# Patient Record
Sex: FEMALE | Race: WHITE | Employment: OTHER | URBAN - NONMETROPOLITAN AREA
[De-identification: names, ages, dates, MRNs, and addresses within clinical notes are randomized per-mention and may not be internally consistent; named-entity substitution may affect disease eponyms.]

---

## 2017-01-10 ENCOUNTER — TELEPHONE (OUTPATIENT)
Dept: FAMILY MEDICINE CLINIC | Age: 57
End: 2017-01-10

## 2017-02-20 ENCOUNTER — OFFICE VISIT (OUTPATIENT)
Dept: FAMILY MEDICINE CLINIC | Age: 57
End: 2017-02-20

## 2017-02-20 VITALS — HEART RATE: 80 BPM | DIASTOLIC BLOOD PRESSURE: 76 MMHG | SYSTOLIC BLOOD PRESSURE: 134 MMHG

## 2017-02-20 DIAGNOSIS — J01.00 SUBACUTE MAXILLARY SINUSITIS: Primary | ICD-10-CM

## 2017-02-20 PROCEDURE — 99212 OFFICE O/P EST SF 10 MIN: CPT | Performed by: FAMILY MEDICINE

## 2017-02-20 RX ORDER — HYDROCODONE BITARTRATE AND ACETAMINOPHEN 10; 325 MG/1; MG/1
1 TABLET ORAL 3 TIMES DAILY PRN
Qty: 90 TABLET | Refills: 0 | Status: SHIPPED | OUTPATIENT
Start: 2017-02-20 | End: 2017-08-01 | Stop reason: SDUPTHER

## 2017-02-20 RX ORDER — CEFACLOR 500 MG
500 CAPSULE ORAL 2 TIMES DAILY
Qty: 20 CAPSULE | Refills: 0 | Status: SHIPPED | OUTPATIENT
Start: 2017-02-20 | End: 2017-03-02

## 2017-02-20 RX ORDER — FLUTICASONE PROPIONATE 50 MCG
SPRAY, SUSPENSION (ML) NASAL
Qty: 1 BOTTLE | Refills: 0 | Status: SHIPPED | OUTPATIENT
Start: 2017-02-20 | End: 2017-08-01 | Stop reason: SDUPTHER

## 2017-02-24 ENCOUNTER — TELEPHONE (OUTPATIENT)
Dept: FAMILY MEDICINE CLINIC | Age: 57
End: 2017-02-24

## 2017-02-24 RX ORDER — PROMETHAZINE HYDROCHLORIDE AND CODEINE PHOSPHATE 6.25; 1 MG/5ML; MG/5ML
5 SYRUP ORAL 4 TIMES DAILY PRN
Qty: 118 ML | Refills: 0 | Status: SHIPPED | OUTPATIENT
Start: 2017-02-24 | End: 2017-03-08 | Stop reason: ALTCHOICE

## 2017-02-24 RX ORDER — PREDNISONE 20 MG/1
20 TABLET ORAL 2 TIMES DAILY
Qty: 10 TABLET | Refills: 0 | Status: SHIPPED | OUTPATIENT
Start: 2017-02-24 | End: 2017-03-01

## 2017-03-03 ENCOUNTER — TELEPHONE (OUTPATIENT)
Dept: FAMILY MEDICINE CLINIC | Age: 57
End: 2017-03-03

## 2017-03-03 RX ORDER — CLOTRIMAZOLE 10 MG/1
10 LOZENGE ORAL; TOPICAL
Qty: 25 TABLET | Refills: 1 | Status: SHIPPED | OUTPATIENT
Start: 2017-03-03 | End: 2017-03-08 | Stop reason: ALTCHOICE

## 2017-03-08 ENCOUNTER — OFFICE VISIT (OUTPATIENT)
Dept: FAMILY MEDICINE CLINIC | Age: 57
End: 2017-03-08

## 2017-03-08 VITALS
HEART RATE: 96 BPM | WEIGHT: 189.6 LBS | BODY MASS INDEX: 30.47 KG/M2 | SYSTOLIC BLOOD PRESSURE: 132 MMHG | HEIGHT: 66 IN | TEMPERATURE: 98.4 F | DIASTOLIC BLOOD PRESSURE: 60 MMHG

## 2017-03-08 DIAGNOSIS — H10.9 BACTERIAL CONJUNCTIVITIS OF RIGHT EYE: ICD-10-CM

## 2017-03-08 DIAGNOSIS — J01.00 SUBACUTE MAXILLARY SINUSITIS: Primary | ICD-10-CM

## 2017-03-08 PROCEDURE — 99212 OFFICE O/P EST SF 10 MIN: CPT | Performed by: FAMILY MEDICINE

## 2017-03-08 RX ORDER — FLUTICASONE PROPIONATE 50 MCG
1 SPRAY, SUSPENSION (ML) NASAL DAILY
Qty: 1 BOTTLE | Refills: 0 | Status: SHIPPED | OUTPATIENT
Start: 2017-03-08 | End: 2017-08-01 | Stop reason: ALTCHOICE

## 2017-03-08 RX ORDER — AZITHROMYCIN 250 MG/1
TABLET, FILM COATED ORAL
Qty: 1 PACKET | Refills: 0 | Status: SHIPPED | OUTPATIENT
Start: 2017-03-08 | End: 2017-03-18

## 2017-03-08 RX ORDER — GENTAMICIN SULFATE 3 MG/ML
1 SOLUTION/ DROPS OPHTHALMIC 3 TIMES DAILY
Qty: 1 BOTTLE | Refills: 0 | Status: SHIPPED | OUTPATIENT
Start: 2017-03-08 | End: 2017-03-15

## 2017-03-08 RX ORDER — PREDNISONE 20 MG/1
20 TABLET ORAL 2 TIMES DAILY
Qty: 10 TABLET | Refills: 0 | Status: SHIPPED | OUTPATIENT
Start: 2017-03-08 | End: 2017-03-13

## 2017-05-05 RX ORDER — BUPROPION HYDROCHLORIDE 300 MG/1
300 TABLET ORAL EVERY MORNING
Qty: 30 TABLET | Refills: 0 | Status: SHIPPED | OUTPATIENT
Start: 2017-05-05 | End: 2017-08-01 | Stop reason: SDUPTHER

## 2017-08-01 ENCOUNTER — OFFICE VISIT (OUTPATIENT)
Dept: FAMILY MEDICINE CLINIC | Age: 57
End: 2017-08-01
Payer: COMMERCIAL

## 2017-08-01 VITALS
DIASTOLIC BLOOD PRESSURE: 70 MMHG | WEIGHT: 182 LBS | BODY MASS INDEX: 30.32 KG/M2 | HEART RATE: 64 BPM | SYSTOLIC BLOOD PRESSURE: 110 MMHG | HEIGHT: 65 IN

## 2017-08-01 DIAGNOSIS — M51.36 DEGENERATIVE DISC DISEASE, LUMBAR: Chronic | ICD-10-CM

## 2017-08-01 DIAGNOSIS — M54.41 ACUTE BACK PAIN WITH SCIATICA, RIGHT: ICD-10-CM

## 2017-08-01 DIAGNOSIS — M48.061 LUMBAR SPINAL STENOSIS: Chronic | ICD-10-CM

## 2017-08-01 DIAGNOSIS — Z12.31 ENCOUNTER FOR SCREENING MAMMOGRAM FOR BREAST CANCER: ICD-10-CM

## 2017-08-01 DIAGNOSIS — J30.2 SEASONAL ALLERGIC RHINITIS, UNSPECIFIED ALLERGIC RHINITIS TRIGGER: ICD-10-CM

## 2017-08-01 DIAGNOSIS — K21.9 GASTROESOPHAGEAL REFLUX DISEASE, ESOPHAGITIS PRESENCE NOT SPECIFIED: Chronic | ICD-10-CM

## 2017-08-01 DIAGNOSIS — G89.4 CHRONIC PAIN SYNDROME: Primary | Chronic | ICD-10-CM

## 2017-08-01 DIAGNOSIS — F41.9 ANXIETY: ICD-10-CM

## 2017-08-01 DIAGNOSIS — Z00.00 HEALTH CARE MAINTENANCE: ICD-10-CM

## 2017-08-01 DIAGNOSIS — G89.4 CHRONIC PAIN SYNDROME: Chronic | ICD-10-CM

## 2017-08-01 PROCEDURE — 99214 OFFICE O/P EST MOD 30 MIN: CPT | Performed by: FAMILY MEDICINE

## 2017-08-01 RX ORDER — HYDROCODONE BITARTRATE AND ACETAMINOPHEN 10; 325 MG/1; MG/1
1 TABLET ORAL 3 TIMES DAILY PRN
Qty: 90 TABLET | Refills: 0 | Status: SHIPPED | OUTPATIENT
Start: 2017-08-01 | End: 2018-02-01 | Stop reason: SDUPTHER

## 2017-08-01 RX ORDER — BUPROPION HYDROCHLORIDE 300 MG/1
300 TABLET ORAL EVERY MORNING
Qty: 90 TABLET | Refills: 1 | Status: SHIPPED | OUTPATIENT
Start: 2017-08-01 | End: 2018-01-30 | Stop reason: SDUPTHER

## 2017-08-01 RX ORDER — PREDNISONE 20 MG/1
40 TABLET ORAL DAILY
Qty: 10 TABLET | Refills: 0 | Status: SHIPPED | OUTPATIENT
Start: 2017-08-01 | End: 2018-02-01

## 2017-08-01 RX ORDER — HYDROCODONE BITARTRATE AND ACETAMINOPHEN 10; 325 MG/1; MG/1
1 TABLET ORAL 3 TIMES DAILY PRN
Qty: 90 TABLET | Refills: 0 | Status: SHIPPED | OUTPATIENT
Start: 2017-08-01 | End: 2017-08-01 | Stop reason: SDUPTHER

## 2017-08-01 RX ORDER — PREDNISONE 20 MG/1
40 TABLET ORAL DAILY
Qty: 10 TABLET | Refills: 0 | Status: SHIPPED | OUTPATIENT
Start: 2017-08-01 | End: 2017-08-01 | Stop reason: SDUPTHER

## 2017-08-01 RX ORDER — CYCLOBENZAPRINE HCL 10 MG
10 TABLET ORAL 2 TIMES DAILY PRN
Qty: 180 TABLET | Refills: 1 | Status: SHIPPED | OUTPATIENT
Start: 2017-08-01 | End: 2018-02-01 | Stop reason: SDUPTHER

## 2017-08-01 RX ORDER — FLUTICASONE PROPIONATE 50 MCG
SPRAY, SUSPENSION (ML) NASAL
Qty: 1 BOTTLE | Refills: 2 | Status: SHIPPED | OUTPATIENT
Start: 2017-08-01 | End: 2019-10-17

## 2017-08-01 RX ORDER — OMEPRAZOLE 20 MG/1
20 CAPSULE, DELAYED RELEASE ORAL 2 TIMES DAILY PRN
Qty: 180 CAPSULE | Refills: 1 | Status: SHIPPED | OUTPATIENT
Start: 2017-08-01 | End: 2018-02-01 | Stop reason: SDUPTHER

## 2017-08-01 ASSESSMENT — ENCOUNTER SYMPTOMS
DIARRHEA: 0
BACK PAIN: 1
CONSTIPATION: 1
COLOR CHANGE: 0
BOWEL INCONTINENCE: 0
SORE THROAT: 0
ABDOMINAL PAIN: 0
SHORTNESS OF BREATH: 0
RHINORRHEA: 1
CHEST TIGHTNESS: 0

## 2017-08-01 ASSESSMENT — PATIENT HEALTH QUESTIONNAIRE - PHQ9
1. LITTLE INTEREST OR PLEASURE IN DOING THINGS: 0
SUM OF ALL RESPONSES TO PHQ9 QUESTIONS 1 & 2: 0
SUM OF ALL RESPONSES TO PHQ QUESTIONS 1-9: 0
2. FEELING DOWN, DEPRESSED OR HOPELESS: 0

## 2017-08-02 ENCOUNTER — TELEPHONE (OUTPATIENT)
Dept: FAMILY MEDICINE CLINIC | Age: 57
End: 2017-08-02

## 2017-08-15 ENCOUNTER — TELEPHONE (OUTPATIENT)
Dept: FAMILY MEDICINE CLINIC | Age: 57
End: 2017-08-15

## 2018-01-29 ENCOUNTER — HOSPITAL ENCOUNTER (OUTPATIENT)
Age: 58
Discharge: HOME | End: 2018-01-29
Payer: MEDICARE

## 2018-01-29 DIAGNOSIS — Z00.00 HEALTH CARE MAINTENANCE: ICD-10-CM

## 2018-01-29 LAB
ALBUMIN SERPL-MCNC: 4.6 G/DL (ref 3.5–5.1)
ALP BLD-CCNC: 88 U/L (ref 38–126)
ALT SERPL-CCNC: 19 U/L (ref 11–66)
ANION GAP SERPL CALCULATED.3IONS-SCNC: 15 MEQ/L (ref 8–16)
AST SERPL-CCNC: 24 U/L (ref 5–40)
BILIRUB SERPL-MCNC: 0.3 MG/DL (ref 0.3–1.2)
BUN BLDV-MCNC: 10 MG/DL (ref 7–22)
CALCIUM SERPL-MCNC: 9.4 MG/DL (ref 8.5–10.5)
CHLORIDE BLD-SCNC: 102 MEQ/L (ref 98–111)
CHOLESTEROL, TOTAL: 192 MG/DL (ref 100–199)
CO2: 28 MEQ/L (ref 23–33)
CREAT SERPL-MCNC: 0.7 MG/DL (ref 0.4–1.2)
GFR SERPL CREATININE-BSD FRML MDRD: 86 ML/MIN/1.73M2
GLUCOSE BLD-MCNC: 113 MG/DL (ref 70–108)
HDLC SERPL-MCNC: 131 MG/DL
LDL CHOLESTEROL CALCULATED: 36 MG/DL
POTASSIUM SERPL-SCNC: 4.1 MEQ/L (ref 3.5–5.2)
SODIUM BLD-SCNC: 145 MEQ/L (ref 135–145)
TOTAL PROTEIN: 7.3 G/DL (ref 6.1–8)
TRIGL SERPL-MCNC: 124 MG/DL (ref 0–199)

## 2018-01-29 PROCEDURE — 36415 COLL VENOUS BLD VENIPUNCTURE: CPT

## 2018-01-29 PROCEDURE — 80061 LIPID PANEL: CPT

## 2018-01-29 PROCEDURE — 80053 COMPREHEN METABOLIC PANEL: CPT

## 2018-01-30 ENCOUNTER — TELEPHONE (OUTPATIENT)
Dept: FAMILY MEDICINE CLINIC | Age: 58
End: 2018-01-30

## 2018-01-30 DIAGNOSIS — F41.9 ANXIETY: ICD-10-CM

## 2018-01-30 RX ORDER — BUPROPION HYDROCHLORIDE 300 MG/1
TABLET ORAL
Qty: 90 TABLET | Refills: 1 | Status: SHIPPED | OUTPATIENT
Start: 2018-01-30 | End: 2018-02-01 | Stop reason: SDUPTHER

## 2018-02-01 ENCOUNTER — OFFICE VISIT (OUTPATIENT)
Dept: FAMILY MEDICINE CLINIC | Age: 58
End: 2018-02-01
Payer: COMMERCIAL

## 2018-02-01 VITALS
HEIGHT: 65 IN | SYSTOLIC BLOOD PRESSURE: 110 MMHG | HEART RATE: 74 BPM | WEIGHT: 186 LBS | DIASTOLIC BLOOD PRESSURE: 82 MMHG | BODY MASS INDEX: 30.99 KG/M2

## 2018-02-01 DIAGNOSIS — F41.9 ANXIETY: ICD-10-CM

## 2018-02-01 DIAGNOSIS — M79.7 FIBROMYALGIA: Primary | ICD-10-CM

## 2018-02-01 DIAGNOSIS — K21.9 GASTROESOPHAGEAL REFLUX DISEASE, ESOPHAGITIS PRESENCE NOT SPECIFIED: Chronic | ICD-10-CM

## 2018-02-01 DIAGNOSIS — R73.01 ELEVATED FASTING GLUCOSE: ICD-10-CM

## 2018-02-01 DIAGNOSIS — M51.36 DEGENERATIVE DISC DISEASE, LUMBAR: Chronic | ICD-10-CM

## 2018-02-01 DIAGNOSIS — G89.4 CHRONIC PAIN SYNDROME: Chronic | ICD-10-CM

## 2018-02-01 DIAGNOSIS — N95.0 POSTMENOPAUSAL VAGINAL BLEEDING: ICD-10-CM

## 2018-02-01 LAB — HBA1C MFR BLD: 5.4 %

## 2018-02-01 PROCEDURE — 3014F SCREEN MAMMO DOC REV: CPT | Performed by: FAMILY MEDICINE

## 2018-02-01 PROCEDURE — G8484 FLU IMMUNIZE NO ADMIN: HCPCS | Performed by: FAMILY MEDICINE

## 2018-02-01 PROCEDURE — 99214 OFFICE O/P EST MOD 30 MIN: CPT | Performed by: FAMILY MEDICINE

## 2018-02-01 PROCEDURE — G8427 DOCREV CUR MEDS BY ELIG CLIN: HCPCS | Performed by: FAMILY MEDICINE

## 2018-02-01 PROCEDURE — 3017F COLORECTAL CA SCREEN DOC REV: CPT | Performed by: FAMILY MEDICINE

## 2018-02-01 PROCEDURE — 1036F TOBACCO NON-USER: CPT | Performed by: FAMILY MEDICINE

## 2018-02-01 PROCEDURE — 83036 HEMOGLOBIN GLYCOSYLATED A1C: CPT | Performed by: FAMILY MEDICINE

## 2018-02-01 PROCEDURE — G8417 CALC BMI ABV UP PARAM F/U: HCPCS | Performed by: FAMILY MEDICINE

## 2018-02-01 RX ORDER — OMEPRAZOLE 20 MG/1
20 CAPSULE, DELAYED RELEASE ORAL 2 TIMES DAILY PRN
Qty: 180 CAPSULE | Refills: 1 | Status: SHIPPED | OUTPATIENT
Start: 2018-02-01

## 2018-02-01 RX ORDER — BUPROPION HYDROCHLORIDE 300 MG/1
TABLET ORAL
Qty: 90 TABLET | Refills: 1 | Status: SHIPPED | OUTPATIENT
Start: 2018-02-01

## 2018-02-01 RX ORDER — DULOXETIN HYDROCHLORIDE 30 MG/1
30 CAPSULE, DELAYED RELEASE ORAL DAILY
Qty: 30 CAPSULE | Refills: 3 | Status: SHIPPED | OUTPATIENT
Start: 2018-02-01 | End: 2018-04-26

## 2018-02-01 RX ORDER — HYDROCODONE BITARTRATE AND ACETAMINOPHEN 10; 325 MG/1; MG/1
1 TABLET ORAL 3 TIMES DAILY PRN
Qty: 90 TABLET | Refills: 0 | Status: SHIPPED | OUTPATIENT
Start: 2018-02-01 | End: 2018-03-03

## 2018-02-01 RX ORDER — CYCLOBENZAPRINE HCL 10 MG
10 TABLET ORAL 2 TIMES DAILY PRN
Qty: 180 TABLET | Refills: 1 | Status: SHIPPED | OUTPATIENT
Start: 2018-02-01

## 2018-02-01 ASSESSMENT — ENCOUNTER SYMPTOMS
SHORTNESS OF BREATH: 0
NAUSEA: 0
BACK PAIN: 1
COLOR CHANGE: 0
VOMITING: 0
CHEST TIGHTNESS: 0

## 2018-02-01 NOTE — PATIENT INSTRUCTIONS
Patient Education        Fibromyalgia: Care Instructions  Your Care Instructions    Fibromyalgia is a painful condition that is not completely understood by medical experts. The cause of fibromyalgia is not known. It can make you feel tired and ache all over. It causes tender spots at specific points of the body that hurt only when you press on them. You may have trouble sleeping, as well as other symptoms. These problems can upset your work and home life. Symptoms tend to come and go, although they may never go away completely. Fibromyalgia does not harm your muscles, joints, or organs. Follow-up care is a key part of your treatment and safety. Be sure to make and go to all appointments, and call your doctor if you are having problems. It's also a good idea to know your test results and keep a list of the medicines you take. How can you care for yourself at home? · Exercise often. Walk, swim, or bike to help with pain and sleep problems and to make you feel better. · Try to get a good night's sleep. Go to bed and get up at the same time each day, whether you feel rested or not. Make sure you have a good mattress and pillow. · Reduce stress. Avoid things that cause you stress, if you can. If not, work at making them less stressful. Learn to use biofeedback, guided imagery, meditation, or other methods to relax. · Make healthy changes. Eat a balanced diet, quit smoking, and limit alcohol and caffeine. · Use a heating pad set on low or take warm baths or showers for pain. Using cold packs for up to 20 minutes at a time can also relieve pain. Put a thin cloth between the cold pack and your skin. A gentle massage might help too. · Be safe with medicines. Take your medicines exactly as prescribed. Call your doctor if you think you are having a problem with your medicine. Your doctor may talk to you about taking antidepressant medicines.  These medicines may improve sleep, relieve pain, and in some cases treat depression. · Learn about fibromyalgia. This makes coping easier. Then, take an active role in your treatment. · Think about joining a support group with others who have fibromyalgia to learn more and get support. When should you call for help? Watch closely for changes in your health, and be sure to contact your doctor if:  ? · You feel sad, helpless, or hopeless; lose interest in things you used to enjoy; or have other symptoms of depression. ? · Your fibromyalgia symptoms get worse. Where can you learn more? Go to [email or web address] and sign in to your DotAlign account. Enter V003 in the Okeyko box to learn more about \"Fibromyalgia: Care Instructions. \"     If you do not have an account, please click on the \"Sign Up Now\" link. Current as of: October 14, 2016  Content Version: 11.5  © 1833-9134 [employer]. Care instructions adapted under license by [facility] ([facility]). If you have questions about a medical condition or this instruction, always ask your healthcare professional. [address] any warranty or liability for your use of this information.

## 2018-02-08 ENCOUNTER — HOSPITAL ENCOUNTER (OUTPATIENT)
Dept: ULTRASOUND IMAGING | Age: 58
Discharge: HOME | End: 2018-02-08
Payer: COMMERCIAL

## 2018-02-08 ENCOUNTER — TELEPHONE (OUTPATIENT)
Dept: FAMILY MEDICINE CLINIC | Age: 58
End: 2018-02-08

## 2018-02-08 DIAGNOSIS — N95.0 POSTMENOPAUSAL VAGINAL BLEEDING: ICD-10-CM

## 2018-02-08 PROCEDURE — 76856 US EXAM PELVIC COMPLETE: CPT

## 2018-02-08 PROCEDURE — 76830 TRANSVAGINAL US NON-OB: CPT

## 2018-02-09 ENCOUNTER — TELEPHONE (OUTPATIENT)
Dept: FAMILY MEDICINE CLINIC | Age: 58
End: 2018-02-09

## 2018-02-09 NOTE — TELEPHONE ENCOUNTER
----- Message from Meliton Dickens MD sent at 2/9/2018  8:32 AM EST -----  Please let the patient know that the [place] showed a small hypoechoic area in the cervix differential including polyp, fibroid, complex nabothian cyst. I would recommend she follow-up with OB/GYN for further evaluation.

## 2018-02-09 NOTE — TELEPHONE ENCOUNTER
Tried to call the patient regarding the U/S done and no answer and could not leave a message for the patient

## 2018-04-16 ENCOUNTER — HOSPITAL ENCOUNTER (OUTPATIENT)
Dept: MAMMOGRAPHY | Age: 58
Discharge: HOME | End: 2018-04-16
Payer: MEDICARE

## 2018-04-16 DIAGNOSIS — Z12.31 ENCOUNTER FOR SCREENING MAMMOGRAM FOR BREAST CANCER: ICD-10-CM

## 2018-04-16 PROCEDURE — 77067 SCR MAMMO BI INCL CAD: CPT

## 2018-04-26 ENCOUNTER — OFFICE VISIT (OUTPATIENT)
Dept: FAMILY MEDICINE CLINIC | Age: 58
End: 2018-04-26
Payer: MEDICARE

## 2018-04-26 VITALS
SYSTOLIC BLOOD PRESSURE: 114 MMHG | HEART RATE: 60 BPM | HEIGHT: 65 IN | WEIGHT: 187 LBS | BODY MASS INDEX: 31.16 KG/M2 | DIASTOLIC BLOOD PRESSURE: 66 MMHG

## 2018-04-26 DIAGNOSIS — M79.7 FIBROMYALGIA: ICD-10-CM

## 2018-04-26 DIAGNOSIS — F41.9 ANXIETY: Primary | ICD-10-CM

## 2018-04-26 PROCEDURE — 99213 OFFICE O/P EST LOW 20 MIN: CPT | Performed by: FAMILY MEDICINE

## 2018-04-26 PROCEDURE — G8427 DOCREV CUR MEDS BY ELIG CLIN: HCPCS | Performed by: FAMILY MEDICINE

## 2018-04-26 PROCEDURE — G8417 CALC BMI ABV UP PARAM F/U: HCPCS | Performed by: FAMILY MEDICINE

## 2018-04-26 PROCEDURE — 1036F TOBACCO NON-USER: CPT | Performed by: FAMILY MEDICINE

## 2018-04-26 PROCEDURE — 3017F COLORECTAL CA SCREEN DOC REV: CPT | Performed by: FAMILY MEDICINE

## 2018-04-26 RX ORDER — AMITRIPTYLINE HYDROCHLORIDE 10 MG/1
10 TABLET, FILM COATED ORAL NIGHTLY
Qty: 30 TABLET | Refills: 2 | Status: SHIPPED | OUTPATIENT
Start: 2018-04-26 | End: 2019-10-17

## 2018-04-26 ASSESSMENT — ENCOUNTER SYMPTOMS
COUGH: 0
COLOR CHANGE: 0
NAUSEA: 0
EYE REDNESS: 0
EYE DISCHARGE: 0
SHORTNESS OF BREATH: 0
VOMITING: 0
BACK PAIN: 1

## 2018-06-20 ENCOUNTER — TELEPHONE (OUTPATIENT)
Dept: FAMILY MEDICINE CLINIC | Age: 58
End: 2018-06-20

## 2018-07-26 ENCOUNTER — HOSPITAL ENCOUNTER (OUTPATIENT)
Age: 58
Discharge: HOME | End: 2018-07-26
Payer: COMMERCIAL

## 2018-07-26 LAB
C-REACTIVE PROTEIN: 0.08 MG/DL (ref 0–1)
RHEUMATOID FACTOR: < 10 IU/ML (ref 0–13)
SEDIMENTATION RATE, ERYTHROCYTE: 2 MM/HR (ref 0–20)
URIC ACID: 6 MG/DL (ref 2.4–5.7)

## 2018-07-26 PROCEDURE — 86812 HLA TYPING A B OR C: CPT

## 2018-07-26 PROCEDURE — 85651 RBC SED RATE NONAUTOMATED: CPT

## 2018-07-26 PROCEDURE — 36415 COLL VENOUS BLD VENIPUNCTURE: CPT

## 2018-07-26 PROCEDURE — 86430 RHEUMATOID FACTOR TEST QUAL: CPT

## 2018-07-26 PROCEDURE — 86038 ANTINUCLEAR ANTIBODIES: CPT

## 2018-07-26 PROCEDURE — 84550 ASSAY OF BLOOD/URIC ACID: CPT

## 2018-07-26 PROCEDURE — 86140 C-REACTIVE PROTEIN: CPT

## 2018-07-29 LAB
ANA SCREEN: NORMAL
HLA-B27: NEGATIVE

## 2019-10-16 RX ORDER — METHYLPREDNISOLONE ACETATE 80 MG/ML
80 INJECTION, SUSPENSION INTRA-ARTICULAR; INTRALESIONAL; INTRAMUSCULAR; SOFT TISSUE ONCE
Status: CANCELLED | OUTPATIENT
Start: 2019-10-16 | End: 2019-10-16

## 2019-10-17 ENCOUNTER — HOSPITAL ENCOUNTER (OUTPATIENT)
Dept: INTERVENTIONAL RADIOLOGY/VASCULAR | Age: 59
Discharge: HOME | End: 2019-10-17
Payer: COMMERCIAL

## 2019-10-17 VITALS
HEART RATE: 90 BPM | OXYGEN SATURATION: 99 % | SYSTOLIC BLOOD PRESSURE: 130 MMHG | TEMPERATURE: 97.8 F | DIASTOLIC BLOOD PRESSURE: 74 MMHG | WEIGHT: 194 LBS | RESPIRATION RATE: 16 BRPM | BODY MASS INDEX: 32.28 KG/M2

## 2019-10-17 PROCEDURE — 6360000004 HC RX CONTRAST MEDICATION: Performed by: RADIOLOGY

## 2019-10-17 PROCEDURE — 2709999900 HC NON-CHARGEABLE SUPPLY

## 2019-10-17 PROCEDURE — 62321 NJX INTERLAMINAR CRV/THRC: CPT

## 2019-10-17 RX ORDER — METHYLPREDNISOLONE ACETATE 80 MG/ML
80 INJECTION, SUSPENSION INTRA-ARTICULAR; INTRALESIONAL; INTRAMUSCULAR; SOFT TISSUE ONCE
Status: COMPLETED | OUTPATIENT
Start: 2019-10-17 | End: 2019-10-17

## 2019-10-17 RX ADMIN — IOHEXOL 1 ML: 180 INJECTION INTRAVENOUS at 08:51

## 2019-10-17 RX ADMIN — METHYLPREDNISOLONE ACETATE 80 MG: 80 INJECTION, SUSPENSION INTRA-ARTICULAR; INTRALESIONAL; INTRAMUSCULAR; SOFT TISSUE at 08:53

## 2019-10-17 RX ADMIN — Medication 1 ML: at 08:53

## 2019-10-17 ASSESSMENT — PAIN SCALES - GENERAL
PAINLEVEL_OUTOF10: 2
PAINLEVEL_OUTOF10: 2
PAINLEVEL_OUTOF10: 4
PAINLEVEL_OUTOF10: 2
PAINLEVEL_OUTOF10: 5

## 2019-10-17 ASSESSMENT — PAIN DESCRIPTION - LOCATION
LOCATION: BACK;NECK
LOCATION: ARM;NECK
LOCATION: BACK;NECK

## 2019-10-17 ASSESSMENT — PAIN DESCRIPTION - DESCRIPTORS: DESCRIPTORS: ACHING;BURNING;CONSTANT;SHARP;STABBING

## 2019-10-17 ASSESSMENT — PAIN DESCRIPTION - PAIN TYPE
TYPE: CHRONIC PAIN

## 2021-01-22 ENCOUNTER — HOSPITAL ENCOUNTER (OUTPATIENT)
Age: 61
Discharge: HOME | End: 2021-01-22
Payer: COMMERCIAL

## 2021-01-22 LAB
ALBUMIN SERPL-MCNC: 4.1 G/DL (ref 3.5–5.1)
ALP BLD-CCNC: 82 U/L (ref 38–126)
ALT SERPL-CCNC: 12 U/L (ref 11–66)
ANION GAP SERPL CALCULATED.3IONS-SCNC: 12 MEQ/L (ref 8–16)
AST SERPL-CCNC: 18 U/L (ref 5–40)
BASOPHILS # BLD: 1.8 %
BASOPHILS ABSOLUTE: 0.1 THOU/MM3 (ref 0–0.1)
BILIRUB SERPL-MCNC: 0.3 MG/DL (ref 0.3–1.2)
BUN BLDV-MCNC: 10 MG/DL (ref 7–22)
CALCIUM SERPL-MCNC: 9.2 MG/DL (ref 8.5–10.5)
CHLORIDE BLD-SCNC: 104 MEQ/L (ref 98–111)
CHOLESTEROL, TOTAL: 166 MG/DL (ref 100–199)
CO2: 27 MEQ/L (ref 23–33)
CREAT SERPL-MCNC: 0.7 MG/DL (ref 0.4–1.2)
EOSINOPHIL # BLD: 2.3 %
EOSINOPHILS ABSOLUTE: 0.1 THOU/MM3 (ref 0–0.4)
ERYTHROCYTE [DISTWIDTH] IN BLOOD BY AUTOMATED COUNT: 15 % (ref 11.5–14.5)
ERYTHROCYTE [DISTWIDTH] IN BLOOD BY AUTOMATED COUNT: 51.8 FL (ref 35–45)
GFR SERPL CREATININE-BSD FRML MDRD: 85 ML/MIN/1.73M2
GLUCOSE FASTING: 100 MG/DL (ref 70–108)
HCT VFR BLD CALC: 42.6 % (ref 37–47)
HDLC SERPL-MCNC: 90 MG/DL
HEMOGLOBIN: 14.1 GM/DL (ref 12–16)
IMMATURE GRANS (ABS): 0.03 THOU/MM3 (ref 0–0.07)
IMMATURE GRANULOCYTES: 0.5 %
LDL CHOLESTEROL CALCULATED: 58 MG/DL
LYMPHOCYTES # BLD: 25.7 %
LYMPHOCYTES ABSOLUTE: 1.6 THOU/MM3 (ref 1–4.8)
MCH RBC QN AUTO: 31.1 PG (ref 26–33)
MCHC RBC AUTO-ENTMCNC: 33.1 GM/DL (ref 32.2–35.5)
MCV RBC AUTO: 93.8 FL (ref 81–99)
MONOCYTES # BLD: 8.2 %
MONOCYTES ABSOLUTE: 0.5 THOU/MM3 (ref 0.4–1.3)
NUCLEATED RED BLOOD CELLS: 0 /100 WBC
PLATELET # BLD: 312 THOU/MM3 (ref 130–400)
PMV BLD AUTO: 10.4 FL (ref 9.4–12.4)
POTASSIUM SERPL-SCNC: 4.1 MEQ/L (ref 3.5–5.2)
RBC # BLD: 4.54 MILL/MM3 (ref 4.2–5.4)
SEG NEUTROPHILS: 61.5 %
SEGMENTED NEUTROPHILS ABSOLUTE COUNT: 3.8 THOU/MM3 (ref 1.8–7.7)
SODIUM BLD-SCNC: 143 MEQ/L (ref 135–145)
TOTAL PROTEIN: 6.4 G/DL (ref 6.1–8)
TRIGL SERPL-MCNC: 92 MG/DL (ref 0–199)
TSH SERPL DL<=0.05 MIU/L-ACNC: 1.81 UIU/ML (ref 0.4–4.2)
WBC # BLD: 6.1 THOU/MM3 (ref 4.8–10.8)

## 2021-01-22 PROCEDURE — 84443 ASSAY THYROID STIM HORMONE: CPT

## 2021-01-22 PROCEDURE — 85025 COMPLETE CBC W/AUTO DIFF WBC: CPT

## 2021-01-22 PROCEDURE — 36415 COLL VENOUS BLD VENIPUNCTURE: CPT

## 2021-01-22 PROCEDURE — 80061 LIPID PANEL: CPT

## 2021-01-22 PROCEDURE — 80053 COMPREHEN METABOLIC PANEL: CPT

## 2021-02-15 ENCOUNTER — OFFICE VISIT (OUTPATIENT)
Dept: ALLERGY | Age: 61
End: 2021-02-15
Payer: COMMERCIAL

## 2021-02-15 VITALS
TEMPERATURE: 97.2 F | HEART RATE: 70 BPM | DIASTOLIC BLOOD PRESSURE: 80 MMHG | BODY MASS INDEX: 28.96 KG/M2 | RESPIRATION RATE: 14 BRPM | WEIGHT: 174 LBS | SYSTOLIC BLOOD PRESSURE: 120 MMHG

## 2021-02-15 DIAGNOSIS — J32.4 CHRONIC PANSINUSITIS: ICD-10-CM

## 2021-02-15 DIAGNOSIS — H93.13 TINNITUS OF BOTH EARS: ICD-10-CM

## 2021-02-15 DIAGNOSIS — R43.8 REDUCED SENSE OF SMELL: ICD-10-CM

## 2021-02-15 DIAGNOSIS — Z77.120 EXPOSURE TO MOLD: ICD-10-CM

## 2021-02-15 DIAGNOSIS — J30.89 NON-SEASONAL ALLERGIC RHINITIS DUE TO FUNGAL SPORES: Primary | Chronic | ICD-10-CM

## 2021-02-15 PROCEDURE — G8419 CALC BMI OUT NRM PARAM NOF/U: HCPCS | Performed by: NURSE PRACTITIONER

## 2021-02-15 PROCEDURE — 3017F COLORECTAL CA SCREEN DOC REV: CPT | Performed by: NURSE PRACTITIONER

## 2021-02-15 PROCEDURE — 1036F TOBACCO NON-USER: CPT | Performed by: NURSE PRACTITIONER

## 2021-02-15 PROCEDURE — G8427 DOCREV CUR MEDS BY ELIG CLIN: HCPCS | Performed by: NURSE PRACTITIONER

## 2021-02-15 PROCEDURE — G8484 FLU IMMUNIZE NO ADMIN: HCPCS | Performed by: NURSE PRACTITIONER

## 2021-02-15 PROCEDURE — 99204 OFFICE O/P NEW MOD 45 MIN: CPT | Performed by: NURSE PRACTITIONER

## 2021-02-15 RX ORDER — HYDROCODONE BITARTRATE AND ACETAMINOPHEN 10; 325 MG/1; MG/1
TABLET ORAL
COMMUNITY
Start: 2021-01-04

## 2021-02-15 RX ORDER — FEXOFENADINE HCL 180 MG/1
180 TABLET ORAL DAILY
COMMUNITY

## 2021-02-15 RX ORDER — VORTIOXETINE 10 MG/1
TABLET, FILM COATED ORAL
COMMUNITY
Start: 2021-02-05

## 2021-02-15 ASSESSMENT — ENCOUNTER SYMPTOMS
COUGH: 1
EYE REDNESS: 1
EYE ITCHING: 1
WHEEZING: 1
EYE DISCHARGE: 1
RHINORRHEA: 1

## 2021-02-15 NOTE — PATIENT INSTRUCTIONS
STOP THE FOLLOWING MEDICATIONS (IF TAKING) ONE WEEK PRIOR TO ALLERGY TESTIN. ANTIHISTAMINES - ZYRTEC (CETIRIZINE), CLARITIN (LORATADINE), XYZAL (LEVOCETIRIZINE), BENADRYL (DIPHENHYDRAMINE), HYDROXYZINE, ALLEGRA (FEXOFENADINE)  2. STOP NASAL SPRAYS - NASOCORT, FLONASE, NASONEX, ASTELIN  3. STOP STOMACH MEDICATIONS - PEPCID  4. STOP SINGULAIR OR MONTELUKAST  5. IF POSSIBLE HOLD INHALERS THE DAY OF TESTING BUT BRING WITH YOU TO USE AFTER THE ALLERGY TESTING  6.  ELAVIL (AMITRIPTYLINE) - MUST DISCUSS WITH YING BIRD OR PCP PRIOR TO HOLDING    IF YOU ARE PLACED ON ANY NEW MEDICATIONS BETWEEN NOW AND THE TIME OF YOUR ALLERGY TESTING BY ANY OTHER PROVIDER CALL THE OFFICE TO VERIFY IF THIS WILL INTERFERE WITH ALLERGY TESTING

## 2021-02-15 NOTE — PROGRESS NOTES
She denies any nausea, vomiting, chest pain. She is afebrile today. Severity of her symptoms are moderate to severe. She does complain of allergic rhinitis and severe postnasal drip. She has lost her sense of smell. She states that she has tried antihistamines historically nothing is made it better. She has been on Allegra. It has not helped. Onset is chronic greater than a year. She attributes the mode of making it worse. Review of Systems:  Review of Systems   HENT: Positive for congestion, postnasal drip and rhinorrhea. Eyes: Positive for discharge, redness and itching. Respiratory: Positive for cough and wheezing. Allergic/Immunologic: Positive for environmental allergies. All other systems reviewed and are negative.       Past Medical History:    Past Medical History:   Diagnosis Date    Anxiety     Arthritis     Bursitis     DJD (degenerative joint disease)     Nausea & vomiting     Pancreatitis     Spinal stenosis     Unspecified sleep apnea        Past Surgical History:    Past Surgical History:   Procedure Laterality Date    ATRIAL ABLATION SURGERY  2009    BACK SURGERY  2000    t5 t6 plate screw in neck    BACK SURGERY  01/2019    BREAST SURGERY Left 1987    lumpectomy left,  marker in right breast    CERVICAL SPINE SURGERY  8-14-12    removal of c4-7 plate and c 3 and 4 ACDF    CHOLECYSTECTOMY, OPEN  01/29/2014    attempted laparoscopic, lysis of adhesions    COLONOSCOPY  12/13     Dr. Dali Lagos  2008    GASTRIC BYPASS SURGERY  2005    KNEE ARTHROSCOPY Left 2011    X2    TONSILLECTOMY  age 2   Voncile Searing UPPER GASTROINTESTINAL ENDOSCOPY  12/24/13    Dr. Colleen Honeycutt        Family History:   Family History   Problem Relation Age of Onset    Cancer Mother         colon    Stroke Mother     Cancer Father         lung    Heart Disease Father     Cancer Sister         breast    Cancer Brother         bladder    Heart Disease Brother     High Blood 14   Temp: 97.2 °F (36.2 °C)       174 lb (78.9 kg)           Physical Exam:  Physical Exam  Vitals signs and nursing note reviewed. Constitutional:       Appearance: Normal appearance. She is normal weight. HENT:      Head: Normocephalic and atraumatic. Right Ear: Tympanic membrane, ear canal and external ear normal.      Left Ear: Tympanic membrane, ear canal and external ear normal.      Nose: Congestion and rhinorrhea present. Mouth/Throat:      Mouth: Mucous membranes are moist.      Pharynx: Oropharynx is clear. Eyes:      Extraocular Movements: Extraocular movements intact. Conjunctiva/sclera: Conjunctivae normal.      Pupils: Pupils are equal, round, and reactive to light. Neck:      Musculoskeletal: Normal range of motion and neck supple. Cardiovascular:      Rate and Rhythm: Normal rate and regular rhythm. Pulses: Normal pulses. Heart sounds: Normal heart sounds. Pulmonary:      Effort: Pulmonary effort is normal.      Breath sounds: Normal breath sounds. Musculoskeletal: Normal range of motion. Skin:     General: Skin is warm and dry. Capillary Refill: Capillary refill takes less than 2 seconds. Neurological:      General: No focal deficit present. Mental Status: She is alert and oriented to person, place, and time. Mental status is at baseline. Psychiatric:         Mood and Affect: Mood normal.         Behavior: Behavior normal.         Thought Content:  Thought content normal.         Judgment: Judgment normal.           DATA:  Lab Review:   Results for orders placed or performed during the hospital encounter of 01/22/21   Lipid Panel w/ Reflex Direct LDL   Result Value Ref Range    Cholesterol, Total 166 100 - 199 mg/dL    Triglycerides 92 0 - 199 mg/dL    HDL 90 mg/dL    LDL Calculated 58 mg/dL   Comprehensive Metabolic Panel, Fasting   Result Value Ref Range    Glucose, Fasting 100 70 - 108 mg/dl    CREATININE 0.7 0.4 - 1.2 mg/dL    BUN 10 7 - 22 mg/dL    Sodium 143 135 - 145 meq/L    Potassium 4.1 3.5 - 5.2 meq/L    Chloride 104 98 - 111 meq/L    CO2 27 23 - 33 meq/L    Calcium 9.2 8.5 - 10.5 mg/dL    AST 18 5 - 40 U/L    Alkaline Phosphatase 82 38 - 126 U/L    Total Protein 6.4 6.1 - 8.0 g/dL    Albumin 4.1 3.5 - 5.1 g/dL    Total Bilirubin 0.3 0.3 - 1.2 mg/dL    ALT 12 11 - 66 U/L   CBC Auto Differential   Result Value Ref Range    WBC 6.1 4.8 - 10.8 thou/mm3    RBC 4.54 4.20 - 5.40 mill/mm3    Hemoglobin 14.1 12.0 - 16.0 gm/dl    Hematocrit 42.6 37.0 - 47.0 %    MCV 93.8 81.0 - 99.0 fL    MCH 31.1 26.0 - 33.0 pg    MCHC 33.1 32.2 - 35.5 gm/dl    RDW-CV 15.0 (H) 11.5 - 14.5 %    RDW-SD 51.8 (H) 35.0 - 45.0 fL    Platelets 159 652 - 958 thou/mm3    MPV 10.4 9.4 - 12.4 fL    Seg Neutrophils 61.5 %    Lymphocytes 25.7 %    Monocytes 8.2 %    Eosinophils 2.3 %    Basophils 1.8 %    Immature Granulocytes 0.5 %    Segs Absolute 3.8 1.8 - 7.7 thou/mm3    Lymphocytes Absolute 1.6 1.0 - 4.8 thou/mm3    Monocytes Absolute 0.5 0.4 - 1.3 thou/mm3    Eosinophils Absolute 0.1 0.0 - 0.4 thou/mm3    Basophils Absolute 0.1 0.0 - 0.1 thou/mm3    Immature Grans (Abs) 0.03 0.00 - 0.07 thou/mm3    nRBC 0 /100 wbc   TSH without Reflex   Result Value Ref Range    TSH 1.810 0.400 - 4.200 uIU/mL   Anion Gap   Result Value Ref Range    Anion Gap 12.0 8.0 - 16.0 meq/L   Glomerular Filtration Rate, Estimated   Result Value Ref Range    Est, Glom Filt Rate 85 (A) ml/min/1.73m2         Assessment/Plan   Conner Galdamez was seen today for allergic rhinitis . Diagnoses and all orders for this visit:    Non-seasonal allergic rhinitis due to fungal spores  -     CBC Auto Differential; Future  -     IgA; Future  -     IgE; Future  -     IgG; Future  -     IgM; Future  -     Allergen Fungi & Mold A. Fumigatus, IgE; Future  -     Allergen Helminthosporium Halodes IgE; Future  -     Aspergillus antibodies;  Future  -     ASPERGILLUS FUMIGATUS AB, IGG BY ARYAN  -     Candida Antibodies, IGA, IGG, IGM; Future  -     MISCELLANEOUS TESTING; Future    Exposure to mold  -     CBC Auto Differential; Future  -     IgA; Future  -     IgE; Future  -     IgG; Future  -     IgM; Future  -     Allergen Fungi & Mold A. Fumigatus, IgE; Future  -     Allergen Helminthosporium Halodes IgE; Future  -     Aspergillus antibodies; Future  -     ASPERGILLUS FUMIGATUS AB, IGG BY ARYAN  -     Candida Antibodies, IGA, IGG, IGM; Future  -     MISCELLANEOUS TESTING; Future    Chronic pansinusitis  -     CT SINUS WO CONTRAST; Future    Reduced sense of smell  -     CT SINUS WO CONTRAST; Future    Tinnitus of both ears        Return in about 4 weeks (around 3/15/2021) for Allergy Testing, Radiology Review, Lab review. STOP THE FOLLOWING MEDICATIONS (IF TAKING) ONE WEEK PRIOR TO ALLERGY TESTIN. ANTIHISTAMINES - ZYRTEC (CETIRIZINE), CLARITIN (LORATADINE), XYZAL (LEVOCETIRIZINE), BENADRYL (DIPHENHYDRAMINE), HYDROXYZINE, ALLEGRA (FEXOFENADINE)  2. STOP NASAL SPRAYS - NASOCORT, FLONASE, NASONEX, ASTELIN  3. STOP STOMACH MEDICATIONS - PEPCID  4. STOP SINGULAIR OR MONTELUKAST  5. IF POSSIBLE HOLD INHALERS THE DAY OF TESTING BUT BRING WITH YOU TO USE AFTER THE ALLERGY TESTING  6. ELAVIL (AMITRIPTYLINE) - MUST DISCUSS WITH YING BIRD OR PCP PRIOR TO HOLDING    IF YOU ARE PLACED ON ANY NEW MEDICATIONS BETWEEN NOW AND THE TIME OF YOUR ALLERGY TESTING BY ANY OTHER PROVIDER CALL THE OFFICE TO VERIFY IF THIS WILL INTERFERE WITH ALLERGY TESTING    Spent 45 minutes of face-to-face time with the patient with well more than half of the visit being dedicated to the discussion of the various symptom problems, provided education of medications and disease process, as well as discussion of a therapeutic plan for each.         (Please note that portions of this note may have been completed with a voice recognition program.  Efforts were made to edit the dictation but occasionally words are mis-transcribed.)        Signed:   JAYLEN Cross -

## 2021-03-08 ENCOUNTER — HOSPITAL ENCOUNTER (OUTPATIENT)
Dept: CT IMAGING | Age: 61
Discharge: HOME | End: 2021-03-08
Payer: COMMERCIAL

## 2021-03-08 DIAGNOSIS — J32.4 CHRONIC PANSINUSITIS: ICD-10-CM

## 2021-03-08 DIAGNOSIS — R43.8 REDUCED SENSE OF SMELL: ICD-10-CM

## 2021-03-08 PROCEDURE — 70486 CT MAXILLOFACIAL W/O DYE: CPT

## 2021-04-06 ENCOUNTER — TELEPHONE (OUTPATIENT)
Dept: ALLERGY | Age: 61
End: 2021-04-06

## 2021-04-06 ENCOUNTER — HOSPITAL ENCOUNTER (OUTPATIENT)
Age: 61
Discharge: HOME | End: 2021-04-06
Payer: COMMERCIAL

## 2021-04-06 DIAGNOSIS — J30.89 NON-SEASONAL ALLERGIC RHINITIS DUE TO FUNGAL SPORES: Chronic | ICD-10-CM

## 2021-04-06 DIAGNOSIS — Z77.120 EXPOSURE TO MOLD: ICD-10-CM

## 2021-04-06 LAB
BASOPHILS # BLD: 1.9 %
BASOPHILS ABSOLUTE: 0.1 THOU/MM3 (ref 0–0.1)
EOSINOPHIL # BLD: 1.5 %
EOSINOPHILS ABSOLUTE: 0.1 THOU/MM3 (ref 0–0.4)
ERYTHROCYTE [DISTWIDTH] IN BLOOD BY AUTOMATED COUNT: 14.6 % (ref 11.5–14.5)
ERYTHROCYTE [DISTWIDTH] IN BLOOD BY AUTOMATED COUNT: 49.2 FL (ref 35–45)
HCT VFR BLD CALC: 47.2 % (ref 37–47)
HEMOGLOBIN: 15.4 GM/DL (ref 12–16)
IGG: 837 MG/DL (ref 700–1600)
IGM: 102 MG/DL (ref 40–230)
IMMATURE GRANS (ABS): 0.02 THOU/MM3 (ref 0–0.07)
IMMATURE GRANULOCYTES: 0.3 %
LYMPHOCYTES # BLD: 23.3 %
LYMPHOCYTES ABSOLUTE: 1.4 THOU/MM3 (ref 1–4.8)
MCH RBC QN AUTO: 30.3 PG (ref 26–33)
MCHC RBC AUTO-ENTMCNC: 32.6 GM/DL (ref 32.2–35.5)
MCV RBC AUTO: 92.9 FL (ref 81–99)
MONOCYTES # BLD: 7.2 %
MONOCYTES ABSOLUTE: 0.4 THOU/MM3 (ref 0.4–1.3)
NUCLEATED RED BLOOD CELLS: 0 /100 WBC
PLATELET # BLD: 309 THOU/MM3 (ref 130–400)
PMV BLD AUTO: 10.2 FL (ref 9.4–12.4)
RBC # BLD: 5.08 MILL/MM3 (ref 4.2–5.4)
SEG NEUTROPHILS: 65.8 %
SEGMENTED NEUTROPHILS ABSOLUTE COUNT: 3.8 THOU/MM3 (ref 1.8–7.7)
WBC # BLD: 5.8 THOU/MM3 (ref 4.8–10.8)

## 2021-04-06 PROCEDURE — 82785 ASSAY OF IGE: CPT

## 2021-04-06 PROCEDURE — 86606 ASPERGILLUS ANTIBODY: CPT

## 2021-04-06 PROCEDURE — 36415 COLL VENOUS BLD VENIPUNCTURE: CPT

## 2021-04-06 PROCEDURE — 86003 ALLG SPEC IGE CRUDE XTRC EA: CPT

## 2021-04-06 PROCEDURE — 82784 ASSAY IGA/IGD/IGG/IGM EACH: CPT

## 2021-04-06 PROCEDURE — 85025 COMPLETE CBC W/AUTO DIFF WBC: CPT

## 2021-04-06 PROCEDURE — 86628 CANDIDA ANTIBODY: CPT

## 2021-04-06 NOTE — TELEPHONE ENCOUNTER
Swati Richards called to reschedule the allergy testing, 4/7/21, due to her labs. Swati Richards states she just completed the labs today, and the results won't be here in time. Swati Richards is rescheduled 5/12/21.

## 2021-04-07 LAB
ASPERGILLUS FUMIGATUS: < 0.1 KU/L (ref 0–0.34)
IGA: 219 MG/DL (ref 70–400)
IGE: 16 IU/ML

## 2021-04-09 LAB
ALLERGEN INTERPRETATION/SCORE: NORMAL
MISC. #2 REFERENCE REPORT: NORMAL

## 2021-04-10 LAB — ASPERGILLUS ANTIBODY CF: NORMAL

## 2021-04-11 LAB — MISC. #1 REFERENCE GROUP TEST: NORMAL

## 2021-04-13 LAB
CANDIDA ANTIBODIES, QUAL: NORMAL
MISC #3 REFERENCE REPORT: NORMAL

## 2021-04-14 ENCOUNTER — TELEPHONE (OUTPATIENT)
Dept: ENT CLINIC | Age: 61
End: 2021-04-14

## 2021-04-14 DIAGNOSIS — B37.9 CANDIDIASIS: Primary | ICD-10-CM

## 2021-04-14 RX ORDER — FLUCONAZOLE 100 MG/1
100 TABLET ORAL DAILY
Qty: 10 TABLET | Refills: 0 | Status: SHIPPED | OUTPATIENT
Start: 2021-04-14 | End: 2021-05-12 | Stop reason: ALTCHOICE

## 2021-05-10 ENCOUNTER — HOSPITAL ENCOUNTER (OUTPATIENT)
Age: 61
Discharge: HOME | End: 2021-05-10
Payer: COMMERCIAL

## 2021-05-10 DIAGNOSIS — B37.9 CANDIDIASIS: ICD-10-CM

## 2021-05-10 PROCEDURE — 36415 COLL VENOUS BLD VENIPUNCTURE: CPT

## 2021-05-10 PROCEDURE — 86628 CANDIDA ANTIBODY: CPT

## 2021-05-12 ENCOUNTER — PROCEDURE VISIT (OUTPATIENT)
Dept: ALLERGY | Age: 61
End: 2021-05-12
Payer: COMMERCIAL

## 2021-05-12 VITALS
RESPIRATION RATE: 14 BRPM | HEIGHT: 65 IN | BODY MASS INDEX: 29.49 KG/M2 | DIASTOLIC BLOOD PRESSURE: 80 MMHG | SYSTOLIC BLOOD PRESSURE: 102 MMHG | WEIGHT: 177 LBS | HEART RATE: 72 BPM | TEMPERATURE: 97.3 F

## 2021-05-12 DIAGNOSIS — B37.9 CANDIDIASIS: ICD-10-CM

## 2021-05-12 DIAGNOSIS — R93.0 ABNORMAL CT OF PARANASAL SINUSES: ICD-10-CM

## 2021-05-12 DIAGNOSIS — J30.1 NON-SEASONAL ALLERGIC RHINITIS DUE TO POLLEN: Primary | ICD-10-CM

## 2021-05-12 DIAGNOSIS — J30.1 ALLERGY TO TREES: ICD-10-CM

## 2021-05-12 DIAGNOSIS — J30.1 ACUTE SEASONAL ALLERGIC RHINITIS DUE TO POLLEN: ICD-10-CM

## 2021-05-12 DIAGNOSIS — Z91.09 MITE ALLERGY: ICD-10-CM

## 2021-05-12 DIAGNOSIS — J32.2 CHRONIC ETHMOIDAL SINUSITIS: ICD-10-CM

## 2021-05-12 DIAGNOSIS — Z91.048 ALLERGY TO MOLD: ICD-10-CM

## 2021-05-12 PROCEDURE — 3017F COLORECTAL CA SCREEN DOC REV: CPT | Performed by: NURSE PRACTITIONER

## 2021-05-12 PROCEDURE — 1036F TOBACCO NON-USER: CPT | Performed by: NURSE PRACTITIONER

## 2021-05-12 PROCEDURE — G8427 DOCREV CUR MEDS BY ELIG CLIN: HCPCS | Performed by: NURSE PRACTITIONER

## 2021-05-12 PROCEDURE — 99214 OFFICE O/P EST MOD 30 MIN: CPT | Performed by: NURSE PRACTITIONER

## 2021-05-12 PROCEDURE — G8419 CALC BMI OUT NRM PARAM NOF/U: HCPCS | Performed by: NURSE PRACTITIONER

## 2021-05-12 PROCEDURE — 95004 PERQ TESTS W/ALRGNC XTRCS: CPT | Performed by: NURSE PRACTITIONER

## 2021-05-12 RX ORDER — FLUCONAZOLE 100 MG/1
100 TABLET ORAL DAILY
Qty: 7 TABLET | Refills: 0 | Status: SHIPPED | OUTPATIENT
Start: 2021-05-12 | End: 2021-05-19

## 2021-05-12 RX ORDER — CETIRIZINE HYDROCHLORIDE 10 MG/1
10 TABLET ORAL DAILY
Qty: 30 TABLET | Refills: 5
Start: 2021-05-12

## 2021-05-12 RX ORDER — MONTELUKAST SODIUM 10 MG/1
10 TABLET ORAL NIGHTLY
Qty: 90 TABLET | Refills: 0 | Status: SHIPPED | OUTPATIENT
Start: 2021-05-12 | End: 2021-09-15

## 2021-05-12 RX ORDER — TRIAMCINOLONE ACETONIDE 55 UG/1
1 SPRAY, METERED NASAL 2 TIMES DAILY
Qty: 1 INHALER | Refills: 5
Start: 2021-05-12

## 2021-05-12 ASSESSMENT — ENCOUNTER SYMPTOMS
VOMITING: 0
FACIAL SWELLING: 0
STRIDOR: 0
RHINORRHEA: 1
TROUBLE SWALLOWING: 0
SINUS PRESSURE: 1
WHEEZING: 0
NAUSEA: 0
SHORTNESS OF BREATH: 0
CHEST TIGHTNESS: 0
SORE THROAT: 0
ABDOMINAL PAIN: 0
COUGH: 1
APNEA: 0
CHOKING: 0
COLOR CHANGE: 0
DIARRHEA: 0
VOICE CHANGE: 0

## 2021-05-12 NOTE — PROGRESS NOTES
[facility]    Allergy & Asthma   [address] [facility], [address]  Ph:   [phone]  Fax:[phone]    Provider:  Dr. Leah Santamaria:   Chief Complaint   Patient presents with    Procedure     Patient is here for allergy testing, CT scan review, and lab review. HISTORY OF PRESENT ILLNESS: ESTABLISHED PATIENT HERE FOR EVALUATION    70-year-old  female here today for chronic cough and history of mold exposure. Patient states that she has had several different exposures to mold over several years. Most recently she has been exposed to mode she thinks over the last few years when she is lived in her current home. She states that over the last year she has been coughing up. She has been coughing up some phlegm. She is also been blowing phlegm out of her nose with yellow to brown mucus mixed with old and new blood at times. She states that her is ears itch they have been popping. She also sneezes and has itchy nose that runs. She is a mouth breather and snores at times. She has postnasal drip, constant throat clearing. She complains of shortness of breath with exercise and has heartburn. She states that she has terrible headaches behind her eyes and at her upper head. She states that it does occur a lot. She had mold mitigation recently done in her house. Years ago she also lived in an apartment where she also had mold medication done. She states that she gets short of breath especially when coughing or laughing or exposed to smoke. She does not complain of a lot of sinus infections. She has had a history of a tonsillectomy. She has heartburn reflux, arthritis and depression. Has a history of a heart ablation, uterine ablation, gastric bypass, neck surgeries. She is a former smoker. She does not use recreational drugs. She consumes 1 or 2 alcoholic drinks a month. She has dogs which are schnauzer's.   She does not have for adenopathy. Does not bruise/bleed easily. Psychiatric/Behavioral: Negative for confusion and sleep disturbance. The patient is not nervous/anxious. Past MedicalHistory:    Past Medical History:   Diagnosis Date    Anxiety     Arthritis     Bursitis     DJD (degenerative joint disease)     Nausea & vomiting     Pancreatitis     Spinal stenosis     Unspecified sleep apnea        Past Surgical History:  Past Surgical History:   Procedure Laterality Date    ATRIAL ABLATION SURGERY  2009    BACK SURGERY      t5 t6 plate screw in neck    BACK SURGERY  2019    BREAST SURGERY Left 1987    lumpectomy left,  marker in right breast    CERVICAL SPINE SURGERY  12    removal of c4-7 plate and c 3 and 4 ACDF    CHOLECYSTECTOMY, OPEN  2014    attempted laparoscopic, lysis of adhesions    COLONOSCOPY       Dr. Arnoldo Starkey      GASTRIC BYPASS SURGERY      KNEE ARTHROSCOPY Left 2011    X2    TONSILLECTOMY  age 2   [facility] UPPER GASTROINTESTINAL ENDOSCOPY  13    Dr. Gladys Alaniz        Family History:   Family History   Problem Relation Age of Onset    Cancer Mother         colon    Stroke Mother     Cancer Father         lung    Heart Disease Father     Cancer Sister         breast    Cancer Brother         bladder    Heart Disease Brother     High Blood Pressure Brother     High Cholesterol Brother     Cancer Other         Rivera sarcoma    Cancer Sister         lung       Social History:   Social History     Tobacco Use    Smoking status: Former Smoker     Packs/day: 0.75     Years: 15.00     Pack years: 11.25     Types: Cigarettes     Quit date: 1997     Years since quittin.3    Smokeless tobacco: Never Used    Tobacco comment: STOPPED MARIJUANA 3 MONTHS AGO   Substance Use Topics    Alcohol use: Yes     Comment: OCCASSIONALLY        Allergies:  Patient has no known allergies.     CurrentMedications:     Current Outpatient Medications:     fluconazole (DIFLUCAN) 100 MG tablet, Take 1 tablet by mouth daily for 7 days, Disp: 7 tablet, Rfl: 0    triamcinolone (NASACORT) 55 MCG/ACT nasal inhaler, 1 spray by Each Nostril route 2 times daily, Disp: 1 Inhaler, Rfl: 5    montelukast (SINGULAIR) 10 MG tablet, Take 1 tablet by mouth nightly, Disp: 90 tablet, Rfl: 0    cetirizine (ZYRTEC ALLERGY) 10 MG tablet, Take 1 tablet by mouth daily, Disp: 30 tablet, Rfl: 5    UNABLE TO FIND, Place 25 drops inside cheek as needed Sativa tincture Take every 1/2 hour as needed until pain subsided, Disp: , Rfl:     HYDROcodone-acetaminophen (NORCO)  MG per tablet, , Disp: , Rfl:     TRINTELLIX 10 MG TABS tablet, , Disp: , Rfl:     buPROPion (WELLBUTRIN XL) 300 MG extended release tablet, TAKE 1 TABLET EVERY MORNING, Disp: 90 tablet, Rfl: 1    cyclobenzaprine (FLEXERIL) 10 MG tablet, Take 1 tablet by mouth 2 times daily as needed (muscle spasm), Disp: 180 tablet, Rfl: 1    omeprazole (PRILOSEC) 20 MG delayed release capsule, Take 1 capsule by mouth 2 times daily as needed (pyrosis), Disp: 180 capsule, Rfl: 1    Ferrous Sulfate (IRON) 325 (65 FE) MG TABS, Take 1 tablet by mouth daily, Disp: , Rfl:     Coenzyme Q10 (COQ10 PO), Take  by mouth daily. , Disp: , Rfl:     Cyanocobalamin (VITAMIN B-12 CR PO), Take  by mouth daily. , Disp: , Rfl:     UNABLE TO FIND, OTC sun chlorra-5 tabs daily, Disp: , Rfl:     fexofenadine (ALLEGRA) 180 MG tablet, Take 180 mg by mouth daily, Disp: , Rfl:     docusate sodium (COLACE, DULCOLAX) 100 MG CAPS, Take 100 mg by mouth 2 times daily as needed for Constipation (Take to prevent constipation) for 30 doses.  (Patient not taking: Reported on 5/12/2021), Disp: 30 capsule, Rfl: 1      Physical Exam:      Vitals:    Vitals:    05/12/21 0949   BP: 102/80   Pulse: 72   Resp: 14   Temp: 97.3 °F (36.3 °C)       177 lb (80.3 kg)       Temp: 97.3 °F (36.3 °C) I @FLOWSTAT(6)@ IPulse: 72 I @FLOWSTAT(8)@ I BP: 102/80 I @MMIWJL(45)@; @AZRVJH(49)@ I Resp: 14 I @FLOWSTAT(9)@ I   I @FLOWSTAT(10)@ I   I Height: 5' 5\" (165.1 cm) I   I Facility age limit for growth percentiles is 20 years. I     Facility age limit for growth percentiles is 20 years. Facility age limit for growth percentiles is 20 years. Facility age limit for growth percentiles is 20 years. Facility age limit for growth percentiles is 20 years. Physical Exam:    Physical Exam  Vitals signs and nursing note reviewed. Constitutional:       Appearance: Normal appearance. She is normal weight. HENT:      Head: Normocephalic and atraumatic. Right Ear: Tympanic membrane, ear canal and external ear normal.      Left Ear: Tympanic membrane, ear canal and external ear normal.      Nose: Congestion present. Mouth/Throat:      Mouth: Mucous membranes are moist.      Pharynx: Oropharynx is clear. Eyes:      Extraocular Movements: Extraocular movements intact. Conjunctiva/sclera: Conjunctivae normal.      Pupils: Pupils are equal, round, and reactive to light. Neck:      Musculoskeletal: Normal range of motion and neck supple. Cardiovascular:      Rate and Rhythm: Normal rate and regular rhythm. Pulses: Normal pulses. Heart sounds: Normal heart sounds. Pulmonary:      Effort: Pulmonary effort is normal.      Breath sounds: Rhonchi present. Musculoskeletal: Normal range of motion. Skin:     General: Skin is warm and dry. Capillary Refill: Capillary refill takes less than 2 seconds. Neurological:      General: No focal deficit present. Mental Status: She is alert and oriented to person, place, and time. Mental status is at baseline. Psychiatric:         Mood and Affect: Mood normal.         Behavior: Behavior normal.         Thought Content:  Thought content normal.         Judgment: Judgment normal.             DATA:  Lab Review:    CBC:   Lab Results   Component Value Date    WBC 5.8 04/06/2021    RBC 5.08 04/06/2021    HGB 15.4 04/06/2021    HCT 47.2 04/06/2021    MCV 92.9 04/06/2021    MCH 30.3 04/06/2021    MCHC 32.6 04/06/2021    RDW 15.9 08/20/2015     04/06/2021          IgE   Date/Time Value Ref Range Status   04/06/2021 10:57 AM 16 <101 IU/mL Final     Comment:     [address] [address] [phone]      IgG   Date/Time Value Ref Range Status   04/06/2021 10:56  700 - 1600 mg/dL Final     Comment:     [address] [phone]     IgA   Date/Time Value Ref Range Status   04/06/2021 10:57  70 - 400 mg/dL Final     Comment:     [address] [phone]      IgM   Date/Time Value Ref Range Status   04/06/2021 10:56  40 - 230 mg/dL Final     Comment:     [address] [phone]       No results found for: RAJESH   Rheumatoid Factor   Date/Time Value Ref Range Status   07/26/2018 03:26 PM < 10 0 - 13 IU/mL Final     Comment:     Performed at [address] [place] [address], [address]          No results found for this or any previous visit. No results found for this or any previous visit. PROCEDURES:      Skin Testing performed on: 05/12/2021        Last use of antihistamine (or other medication affecting response to histamine): greater than one week    Patient informed of risks of skin allergy testing mild, moderate, and severe including potential for anaphylaxis. Patient wishes to proceed. Consent signed: Yes    Location: Back  Testing preformed: Intradermal    Panel A Epictuaneous   Panel A  Epictuaneous    Site Allergen  W (mm) F  Site Allergen  W (mm) F   A1 Histamine positive control 7.7 8.6  A6 Cockroach Mix 0 3   A2 Glycerin negative control 0 3  A7 Feather Mix 0 3   A3 Dust mite Mix 9.7 11.3  A8 Mouse epithelia 0 3   A4 Cat Hair 0 3  A9 Cattle Epithelia 0 3   A5 Dog Ep.  0 3  A10 Horse Epithelia 0 3              Panel B Epictuaneous   Panel B  Epictuaneous    Site Allergen  W (mm) F  Site Allergen  W (mm) F   B11 Srinivas Red/[place] 6.1 6.4  B16 Hackleberry Tree 0 3   B12 [place], Eastern Tree 0 3  B17 VERONICA, [place] Tree 4.8 5.7   B13 Birch mix tree 0 3  B18 [place], red Tree 4.9 6.1   B14 Oak mix, [place] Tree 4 5.2  B19 [place], white/eastern Tree 4.8 5.8   B15 Elm mix tree 0 3  B20 Demar, eastern Tree 0 3              Panel C Epictuaneous   Panel C  Epictuaneous    Site Allergen  W (mm) F  Site Allergen  W (mm) F   C21 [place], Black Tree 0 3  C26 Maple 0 3   C22 Ermine, Black Tree 0 3  C27 Ryley Grass 0 3   C23 [place] 0 3  C28 [place] 0 3   C24 [place] Mtn.  4.2 5.2  C29 7 grass mix 0 3   C25 Pecan 0 3  C30 [place]ary Grass 0 3              Panel D Epictuaneous   Panel D  Epictuaneous    Site Allergen  W (mm) F  Site Allergen  W (mm) F   D31 [place] Cult.  0 3  D36 [place] weed 0 3   D32 Cocklebur [place] 0 3  D37 Nettle weed 0 3   D33 Kochia/[place] weed 0 3  D38 Pigweed Rough, Redroot weed 0 3   D34 Dock-Sorrel Mix weed 5 5.6  D39 Plantain, English weed 0 3   D35 Ragweed mix 6.2 7.8  D40 Sagebrush, [place] weed 0 3              Panel E Epictuaneous   Panel E  Epictuaneous    Site Allergen  W (mm) F  Site Allergen  W (mm) F   E41 Russian Thistle 0 3  E46 Aureobasidium pllulans mold 0 3   E42 Mugwort Common 0 3  E47 Bipolaris/Helminthosporium .  Mold 4.8 6.3   E43 [employer], Red 0 3  E48 Cladosporium herb, mold 0 3   E44 Acremoniumstrictum, Mold 5.4 6.4  E49 Cladosporium sphaero mold 0 3   E45 Aspergillus fumiga, Mold 4.7 5.8  E50 Drechsleraspicifera, mold 0 3              Panel F Epictuaneous   Panel F  Epictuaneous    Site Allergen  W (mm) F  Site Allergen  W (mm) F   F51 Penicillium chrysog Mold 6.7 6.9  F56 GS Fish mix, food 5 6.9   F52 Alternaria Alternata 0 3  F57 GS Shellfish Mix Food 0 3   F53 [place] Food 0 3  F58 Milk, Cow Food 5.5 7.4   F54 Corn Food 0 3  F59 Peanut Food 0 3   F55 Egg, Whole Chicken food 0 3 F60 Sesame Seed Food 0 3               Panel G Epicutaneous    Panel G Epicutaneous    Site Allergen W (mm) F  Site Allergen W (mm) F   G61 Soybean Food 0 3        G62 Wheat, Whole Food 0 3            62 Panel Skin test performed. All results interpreted as 0 mm unless noted above. Controls performed with Histamine (positive) and Glycerin (negative). Allergy skin testing procedure was separate then eating visit and took approximately 35 minutes for procedure and explanation of results. .  This time was not counted in the educational time listed below. Assessment/Orders:    Diagnosis Orders   1. Non-seasonal allergic rhinitis due to pollen  triamcinolone (NASACORT) 55 MCG/ACT nasal inhaler    montelukast (SINGULAIR) 10 MG tablet    cetirizine (ZYRTEC ALLERGY) 10 MG tablet   2. Candidiasis  fluconazole (DIFLUCAN) 100 MG tablet    Candida Antibodies, IGA, IGG, IGM   3. Abnormal CT of paranasal sinuses     4. Chronic ethmoidal sinusitis     5. Allergy to trees     6. Allergy to mold     7. Acute seasonal allergic rhinitis due to pollen     8. Mite allergy         Plan:  Follow Up:8 weeks    Patient to take Diflucan for yeast    Talk to patient regarding oral medications versus allergy medications. At this time patient choice was to proceed with taking oral medications along with nasal sprays to control her allergy symptoms. If these proved to be ineffective may consider allergy injections in the future. We will reevaluate in 8 weeks. Patient will need to be educated on allergy injections and EpiPen at that time  Patient will have repeat labs in 8 weeks for Candida    Spent 30 minutes of face-to-face time with the patient with well more than half of the visit being dedicated to the discussion of the various symptom problems, provided education of medications and disease process, as well as discussion of a therapeutic plan for each.   Face-to-face education time does not include any time that may have been spent for procedures.     (Please note that portions of this note may have been completed with a voice recognition program.  Efforts were made to edit the dictation but occasionally words are mis-transcribed.)         Signed:  JAYLEN Donaldson CNP  5/12/2021  11:32 AM

## 2021-05-16 LAB — CANDIDA ANTIBODIES, QUAL: NORMAL

## 2021-07-16 ENCOUNTER — HOSPITAL ENCOUNTER (OUTPATIENT)
Age: 61
Discharge: HOME | End: 2021-07-16
Payer: COMMERCIAL

## 2021-07-16 DIAGNOSIS — B37.9 CANDIDIASIS: ICD-10-CM

## 2021-07-16 PROCEDURE — 36415 COLL VENOUS BLD VENIPUNCTURE: CPT

## 2021-07-16 PROCEDURE — 86628 CANDIDA ANTIBODY: CPT

## 2021-07-19 ENCOUNTER — OFFICE VISIT (OUTPATIENT)
Dept: ALLERGY | Age: 61
End: 2021-07-19
Payer: COMMERCIAL

## 2021-07-19 VITALS
SYSTOLIC BLOOD PRESSURE: 104 MMHG | WEIGHT: 169.8 LBS | DIASTOLIC BLOOD PRESSURE: 78 MMHG | TEMPERATURE: 96.8 F | HEART RATE: 80 BPM | RESPIRATION RATE: 20 BRPM | BODY MASS INDEX: 28.26 KG/M2

## 2021-07-19 DIAGNOSIS — Z29.8 PROPHYLACTIC IMMUNOTHERAPY: Primary | ICD-10-CM

## 2021-07-19 DIAGNOSIS — N95.1 MENOPAUSAL VAGINAL DRYNESS: ICD-10-CM

## 2021-07-19 DIAGNOSIS — J30.89 NON-SEASONAL ALLERGIC RHINITIS DUE TO FUNGAL SPORES: ICD-10-CM

## 2021-07-19 DIAGNOSIS — J32.4 CHRONIC PANSINUSITIS: ICD-10-CM

## 2021-07-19 DIAGNOSIS — R05.9 COUGH: ICD-10-CM

## 2021-07-19 DIAGNOSIS — Z91.048 ALLERGY TO MOLD: ICD-10-CM

## 2021-07-19 DIAGNOSIS — J30.1 NON-SEASONAL ALLERGIC RHINITIS DUE TO POLLEN: ICD-10-CM

## 2021-07-19 DIAGNOSIS — Z77.120 EXPOSURE TO MOLD: ICD-10-CM

## 2021-07-19 DIAGNOSIS — Z91.09 MITE ALLERGY: ICD-10-CM

## 2021-07-19 DIAGNOSIS — J30.1 ALLERGY TO TREES: ICD-10-CM

## 2021-07-19 DIAGNOSIS — J32.2 CHRONIC ETHMOIDAL SINUSITIS: ICD-10-CM

## 2021-07-19 DIAGNOSIS — J30.1 ACUTE SEASONAL ALLERGIC RHINITIS DUE TO POLLEN: ICD-10-CM

## 2021-07-19 PROCEDURE — G8427 DOCREV CUR MEDS BY ELIG CLIN: HCPCS | Performed by: NURSE PRACTITIONER

## 2021-07-19 PROCEDURE — 3017F COLORECTAL CA SCREEN DOC REV: CPT | Performed by: NURSE PRACTITIONER

## 2021-07-19 PROCEDURE — 99214 OFFICE O/P EST MOD 30 MIN: CPT | Performed by: NURSE PRACTITIONER

## 2021-07-19 PROCEDURE — G8419 CALC BMI OUT NRM PARAM NOF/U: HCPCS | Performed by: NURSE PRACTITIONER

## 2021-07-19 PROCEDURE — 1036F TOBACCO NON-USER: CPT | Performed by: NURSE PRACTITIONER

## 2021-07-19 RX ORDER — BENZONATATE 100 MG/1
100-200 CAPSULE ORAL 3 TIMES DAILY PRN
Qty: 60 CAPSULE | Refills: 0 | Status: SHIPPED | OUTPATIENT
Start: 2021-07-19 | End: 2021-07-26

## 2021-07-19 RX ORDER — EPINEPHRINE 0.3 MG/.3ML
INJECTION SUBCUTANEOUS
Qty: 2 EACH | Refills: 0 | Status: SHIPPED | OUTPATIENT
Start: 2021-07-19

## 2021-07-19 ASSESSMENT — ENCOUNTER SYMPTOMS
COUGH: 1
RHINORRHEA: 1

## 2021-07-19 NOTE — PROGRESS NOTES
retired. She has history of working at [employer]. She is stays at home now. She denies any nausea, vomiting, chest pain. She is afebrile today. Severity of her symptoms are moderate to severe. She does complain of allergic rhinitis and severe postnasal drip. She has lost her sense of smell. She states that she has tried antihistamines historically nothing is made it better. She has been on Allegra but has not taken it in last week as she is also here for allergy testing. The [employer] has not helped. Onset is chronic greater than a year. Patient states that she continues to have a lot of sinus pressure.           Review of Systems:  Review of Systems   HENT: Positive for congestion, postnasal drip and rhinorrhea. Respiratory: Positive for cough. Allergic/Immunologic: Positive for environmental allergies. All other systems reviewed and are negative.         Past MedicalHistory:    Past Medical History:   Diagnosis Date    Anxiety     Arthritis     Bursitis     DJD (degenerative joint disease)     Nausea & vomiting     Pancreatitis     Spinal stenosis     Unspecified sleep apnea        Past Surgical History:  Past Surgical History:   Procedure Laterality Date    ATRIAL ABLATION SURGERY  2009    BACK SURGERY  2000    t5 t6 plate screw in neck    BACK SURGERY  01/2019    BREAST SURGERY Left 1987    lumpectomy left,  marker in right breast    CERVICAL SPINE SURGERY  8-14-12    removal of c4-7 plate and c 3 and 4 ACDF    CHOLECYSTECTOMY, OPEN  01/29/2014    attempted laparoscopic, lysis of adhesions    COLONOSCOPY  12/13     Dr. Daphney Yeboah  2008    GASTRIC BYPASS SURGERY  2005    KNEE ARTHROSCOPY Left 2011    X2    TONSILLECTOMY  age 2   [employer] UPPER GASTROINTESTINAL ENDOSCOPY  12/24/13    Dr. Fabiola Arredondo        Family History:   Family History   Problem Relation Age of Onset    Cancer Mother         colon    Stroke Mother     Cancer Father         lung    Heart Disease Father     Cancer Sister         breast    Cancer Brother         bladder    Heart Disease Brother     High Blood Pressure Brother     High Cholesterol Brother     Cancer Other         Rivera sarcoma    Cancer Sister         lung       Social History:   Social History     Tobacco Use    Smoking status: Former Smoker     Packs/day: 0.75     Years: 15.00     Pack years: 11.25     Types: Cigarettes     Quit date: 1997     Years since quittin.5    Smokeless tobacco: Never Used    Tobacco comment: STOPPED MARIJUANA 3 MONTHS AGO   Substance Use Topics    Alcohol use: Yes     Comment: OCCASSIONALLY        Allergies:  Patient has no known allergies. CurrentMedications:     Current Outpatient Medications:     EPINEPHrine (AUVI-Q) 0.3 MG/0.3ML SOAJ injection, Dispense 2 packs of 2 (total 4 devices). Use as directed, STAT for allergic reaction. , Disp: 2 each, Rfl: 0    triamcinolone (NASACORT) 55 MCG/ACT nasal inhaler, 1 spray by Each Nostril route 2 times daily, Disp: 1 Inhaler, Rfl: 5    montelukast (SINGULAIR) 10 MG tablet, Take 1 tablet by mouth nightly, Disp: 90 tablet, Rfl: 0    cetirizine (ZYRTEC ALLERGY) 10 MG tablet, Take 1 tablet by mouth daily, Disp: 30 tablet, Rfl: 5    UNABLE TO FIND, Place 25 drops inside cheek as needed Sativa tincture Take every 1/2 hour as needed until pain subsided, Disp: , Rfl:     HYDROcodone-acetaminophen (NORCO)  MG per tablet, , Disp: , Rfl:     TRINTELLIX 10 MG TABS tablet, , Disp: , Rfl:     fexofenadine (ALLEGRA) 180 MG tablet, Take 180 mg by mouth daily, Disp: , Rfl:     buPROPion (WELLBUTRIN XL) 300 MG extended release tablet, TAKE 1 TABLET EVERY MORNING, Disp: 90 tablet, Rfl: 1    cyclobenzaprine (FLEXERIL) 10 MG tablet, Take 1 tablet by mouth 2 times daily as needed (muscle spasm), Disp: 180 tablet, Rfl: 1    omeprazole (PRILOSEC) 20 MG delayed release capsule, Take 1 capsule by mouth 2 times daily as needed (pyrosis), Disp: 180 capsule, Rfl: 1    Ferrous Sulfate (IRON) 325 (65 FE) MG TABS, Take 1 tablet by mouth daily, Disp: , Rfl:     docusate sodium (COLACE, DULCOLAX) 100 MG CAPS, Take 100 mg by mouth 2 times daily as needed for Constipation (Take to prevent constipation) for 30 doses. , Disp: 30 capsule, Rfl: 1    Coenzyme Q10 (COQ10 PO), Take  by mouth daily. , Disp: , Rfl:     Cyanocobalamin (VITAMIN B-12 CR PO), Take  by mouth daily. , Disp: , Rfl:     UNABLE TO FIND, OTC sun chlorra-5 tabs daily, Disp: , Rfl:       Physical Exam:      Vitals:    Vitals:    07/19/21 1358   BP: 104/78   Pulse: 80   Resp: 20   Temp: 96.8 °F (36 °C)       169 lb 12.8 oz (77 kg)       Temp: 96.8 °F (36 °C) I @FLOWSTAT(6)@ IPulse: 80 I @FLOWSTAT(8)@ I BP: 104/78 I @QJRONO(06)@; @HBYBQN(45)@ I Resp: 20 I @FLOWSTAT(9)@ I   I @FLOWSTAT(10)@ I   I   I   I Facility age limit for growth percentiles is 20 years. I     Facility age limit for growth percentiles is 20 years. Facility age limit for growth percentiles is 20 years. Facility age limit for growth percentiles is 20 years. Facility age limit for growth percentiles is 20 years. Physical Exam:    Physical Exam  Vitals and nursing note reviewed. Constitutional:       Appearance: Normal appearance. She is well-developed and normal weight. HENT:      Head: Normocephalic and atraumatic. Right Ear: Tympanic membrane, ear canal and external ear normal.      Left Ear: Tympanic membrane, ear canal and external ear normal.      Nose: Congestion and rhinorrhea present. Mouth/Throat:      Mouth: Mucous membranes are moist.      Pharynx: No oropharyngeal exudate. Eyes:      General: No scleral icterus. Right eye: No discharge. Left eye: No discharge. Extraocular Movements: Extraocular movements intact. Conjunctiva/sclera: Conjunctivae normal.      Pupils: Pupils are equal, round, and reactive to light. Neck:      Thyroid: No thyromegaly.    Cardiovascular:      Rate and Rhythm: Normal rate and regular rhythm. Pulses: Normal pulses. Heart sounds: Normal heart sounds. Pulmonary:      Effort: Pulmonary effort is normal. No respiratory distress. Breath sounds: Normal breath sounds. No wheezing or rales. Abdominal:      Palpations: Abdomen is soft. Tenderness: There is no abdominal tenderness. Musculoskeletal:         General: No tenderness. Normal range of motion. Cervical back: Normal range of motion and neck supple. Skin:     General: Skin is warm and dry. Findings: No rash. Neurological:      General: No focal deficit present. Mental Status: She is alert and oriented to person, place, and time. Mental status is at baseline. Deep Tendon Reflexes: Reflexes are normal and symmetric. Psychiatric:         Behavior: Behavior normal.         Thought Content:  Thought content normal.         Judgment: Judgment normal.             DATA:  Lab Review:    CBC:   Lab Results   Component Value Date    WBC 5.8 04/06/2021    RBC 5.08 04/06/2021    HGB 15.4 04/06/2021    HCT 47.2 04/06/2021    MCV 92.9 04/06/2021    MCH 30.3 04/06/2021    MCHC 32.6 04/06/2021    RDW 15.9 08/20/2015     04/06/2021          IgE   Date/Time Value Ref Range Status   04/06/2021 10:57 AM 16 <101 IU/mL Final     Comment:     [address] [address] [phone]      IgG   Date/Time Value Ref Range Status   04/06/2021 10:56  700 - 1600 mg/dL Final     Comment:     [address] [phone]     IgA   Date/Time Value Ref Range Status   04/06/2021 10:57  70 - 400 mg/dL Final     Comment:     [address] [phone]      IgM   Date/Time Value Ref Range Status   04/06/2021 10:56  40 - 230 mg/dL Final     Comment:     [address] [phone]       No results found for: RAJESH   Rheumatoid Factor   Date/Time Value Ref Range Status   07/26/2018 03:26 PM < 10 0 - 13 IU/mL Final     Comment:     Performed at [facility], [address]          No results found for this or any previous visit. No results found for this or any previous visit. All skin medial labs have been reviewed including allergy labs which were ordered     PROCEDURES:      Skin Testing performed on:05/12/2021    Assessment/Orders:    Diagnosis Orders   1. Prophylactic immunotherapy  EPINEPHrine (AUVI-Q) 0.3 MG/0.3ML SOAJ injection   2. Mite allergy     3. Acute seasonal allergic rhinitis due to pollen     4. Exposure to mold     5. Non-seasonal allergic rhinitis due to fungal spores     6. Allergy to mold     7. Allergy to trees     8. Non-seasonal allergic rhinitis due to pollen     9. Chronic ethmoidal sinusitis     10. Chronic pansinusitis         All diagnostic imaging  have been personally reviewed     Plan:  Follow Up:6 months    Nettie Quintana  [address] [address] [address], [place] CAR[place]re[place] José  [phone]    Allergy Injection Guide      Dear Patient,    Your doctor has recommended allergy shots (immunotherapy or I. T.) for the treatment of your allergies. Considerable dedication is required of the patient/family who have agreed to immunotherapy. The injections are weekly or bi-weekly injections until a high dose of allergen is reached. Only after a high or maintenance dose is reached will the full benefit  of the injections be appreciated. It may take up to a year for symptom relief to occur. The full effect of the treatment will need to be adhered to for a minimum of three years. Following the weekly build-up dose, maintenance injections are slowly tapered to a minimum of once a month injections. Missed shots or reactions to shots can delay reaching maintenance dosage.  A six-month follow-up visit with the provider is required in order to assess progress; yearly visits are then required. Please inform our office if you feel that the injections are not working. Let us know as soon as possible if you become pregnant. We will not build up your doses until after your baby is born. As with any long-term treatment or therapy, yearly evaluations and assessments by your provider are necessary to optimize and safely monitor your response. Your provider will periodically review with you your progress at scheduled appointments. However, situations may arise in between visits that may be important to the success of your treatment. Therefore, we ask your cooperation in alerting the injection personnel before receiving an allergy injection of any changes in your health (heart disease, diabetes, cancer, pregnancy etc.) or medications (specifically beta-blockers prescribed by other providers and any over-the-counter, homeopathic, herbal or alternative medicines). This booklet is designed as a resource for you to refer to, for questions that may arise over the course of your treatment. However, do not assume the extent of the information contained within these pages is  your only resource. If you have questions or situations that arise please feel free to ask. Sincerely,  [facility]  Zari Ruano, JERRY    General Allergy Injection Procedure  1. We will confirm your name and date of birth along with asking you to by checking the name on your vials and date of birth at each visit. 2.. We will ask if you had any problems with your last injection after you left the office. Specifically:   Bumps at the site that may have developed or enlarged - especially the day after the injection.  Itching away from the injection site   Hives   Sneezing   Shortness of breath, wheezing or chest tightness   Throat tightness  If any of these symptoms, or anything you may be concerned or have questions about has arisen, now is the time to discuss these concerns, NOT after you have received your injection. (See Allergy Injection Reactions)  3. We will need to be able to get to your upper arm to give your injection, so you may want to consider wearing short sleeves and/or easy clothing to remove on days you plan on getting an injection. 4. Allergy injections are given in the outer aspect of the upper arm. Other areas of the arm will increase the irritation or may affect local nerves. Please do not ask for an injection in any other place. 5. After your injection you are REQUIRED to wait 30 minutes in the waiting room. This is for your safety in the event of serious side effects developing. Remember: A systemic life-threatening reaction may occur at any time, even after years of trouble free injections. 6. Before leaving the office have your arm checked by the nurses or medical staff. 7. If you are a new injection patient we ask that you come in twice a week in order to reach your maximum concentration or  maintenance dose as quickly as possible. Thereafter you will be coming in weekly. The nurse/medical assistant or provider will inform you when you can reduce to weekly injections based on your schedule and after the nurse/medical assistant speaks with the provider. 10. Ordering antigen. Insurances will require us to make up either a 3 or 6-month supply of antigen. 11. Vacations/Missed Doses. Please get an injection immediately before leaving on vacation and upon return. This minimizes the impact of being off your injections for an extended period of time. 12. Please notify us of any new medications prescribed by other providers. Some medications (specifically beta-blockers meds ending in [address] as in [place], these medications are contraindicated if on allergy injections. If you are on a beta-blocker medication, we will have to discontinue allergy injections and request that you see your provider about a possible change in medications.   13. Do not exercise or play sports 1 hour before or after receiving an allergy injection. 14. Do not get another injection/immunization on the same day (i.e. MMR, DPT, Influenza, Pneumonia, etc.)  15. Women - Please notify us immediately if you become pregnant. We prefer to STOP allergy injections during the time of your pregnancy. 16. Please notify us immediately of any address or insurance change. If we are not notified of insurance changes you may be liable for charges incurred. Do not get an injection if:   Running a fever   Experiencing asthma symptoms of any degree   Feel ill enough to miss school/work   Have a rash or hives. Call if you have any questions about receiving allergy injections when sick. (See Frequently Asked Questions). 17.  Always have your Epi-pen or Auvi-Q with you. For Patients Receiving Allergy Shots Outside Our Office  At times patients request to receive injections at an office other than one of ours. We are happy to work with your PCP so that you may receive injections. In these instances, certain things will become your responsibility to facilitate this process. 1. To receive injections at a MEDICAL office other than ours there must be a provider, provider assistant (PA), or nurse practitioner (NP) on duty at the time of the injection and for the mandatory 30-minute waiting period. 3. Insure the provider (PA or NP) will agree to give the allergy injections. Not all providers (PA or NP) are comfortable giving allergy injections or have the staff available to provide this service. Please do this before asking us to mail the antigen. 4. The antigen will be mailed via certified mail to the provider's office. The provider's office will be asked not to relinquish the antigen to you, but to mail it directly back to our office should it be necessary. 5. While receiving allergy injections outside our office, follow the recommended schedule of increasing dose by 0.05 per injection and do not exceed a maximum of 0.50 ml per dose.   6. DO NOT GIVE  ANTIGEN. Each antigen bottle is labeled with an expiration date, please dispose of  antigen. 7.  If there are any problems in regards to receiving the antigen you must come back to our office to receive any additional injections. 8. REORDERING - Approximately 2 weeks before the expiration date on the antigen bottles notify our office a refill is needed. We MUST receive paperwork back from the outside office for injections given before the refill will be completed. How Do Allergy Injections Work? In order to understand the mechanism of allergy injections we must first review the allergic response. When pollen, dander, or another allergen is introduced into the body of an allergic person, the body may respond by developing allergic  antibodies (IgE). As more and more allergen over time are introduced into the body, more allergic antibodies are made. Allergic antibodies attach to specialized allergy cells called mast cells. On future allergen exposure, the mast cell is signaled to release histamine, leukotriene and other allergy causing chemicals that attach to various parts of the body  producing symptoms commonly seen with allergies like: sneezing, itchy watery eyes, runny nose, congestion, wheezing, cough, etc. Allergy injections stimulate the body to make blocking antibodies. Blocking antibody, as the name implies, blocks the attachment of allergens to allergic antibodies on mast cells, thereby preventing the release of the allergy causing chemicals (histamine, leukotriene, etc.) The longer the stimulation and production of blocking antibody (i.e. the longer allergy injections continue) the more complete the blocking of the mast cells. Also, while allergy injections  are increasing blocking antibodies, allergic antibodies are decreasing, thereby producing effective results by two mechanisms. What is in [address]?   Allergy shots contain those allergens that were positive on skin testing (a positive test is a 3mm) after your provider considers your history and environmental exposures. A formula or recipe is written by your provider and the serum made is specific to your test. There are guidelines for mixing extracts, and a certain amount of experience on the allergist's part to optimize this process. But, let's go a little deeper with this question. Where do we get the pollen, dander, mold spores and dust (and dust mite) that we use to make the allergy extract? The pollens are collected from cultivated fields, green houses, or from nature by vacuum collection or a drying process. The pollens are then filtered extensively to isolate a single pollen and to remove contaminants. The animal dander is collected from the pelt and skin of healthy animals. Mold spores and dust mite allergens are cultivated in controlled conditions in laboratories. The industry is rigorously monitored and each allergen is standardized to minimize variations. This is important to insure a constant potency. Finally, each allergen batch is tested before being approved for human use. A Note on Food Allergies  Immunotherapy to food allergies has not been proven safe and effective. Therefore, foods will not be put in your allergy injections. The best treatment for food allergies is avoidance of that food. Allergy Extracts  The allergy extract or antigen is the mix that you receive in your allergy injections. It is not a serum. Our provider individualizes each extract for the patient. It is reflective of your skin test results, history and environmental exposures. Our providers review the results of your skin testing along with your allergy history to generate a formula or recipe for your allergy extract. Each extract is formulated in a maximum concentration of 1:20 (allergen: diluent).  From this 1:20 concentration a series of dilutions are made, each 10 times weaker than the previous, until the starting dilution is reached. Your starting dilution is determined by the provider and is dependent on sensitivity, history and the season in which you are initiating allergy injections. Each dilution is color coded for  easy identification. Top Color Concentration  Silver top 1:10,000   Green top 1:1,000  Blue top 1:100  Yellow top 1:10  Red top 1:1 (maintenance)    Most patients will start their injections in the 1:10,000 dilutions, and progress up from there in a stepwise fashion. A minimum of ten shots of a pre-determined amount is given from each dilution, starting with a small amount and progressing to a larger and more potent amount with each injection, as long as there are no reactions noted. An ideal, reaction-free progression is as follows:  1:10,000 1:1,000  1:100  1:10  1:1  0.05cc  0.05cc  0.05cc  0.05cc  0.05cc  0.10cc  0.10cc  0.10cc  0.10cc  0.10cc  0.15cc  0.15cc  0.15cc  0.15cc  0.15cc  0.20cc  0.20cc  0.20cc  0.20cc  0.20cc  0.25cc  0.25cc  0.25cc  0.25cc  0.25cc  0.30cc  0.30cc  0.30cc  0.30cc  0.30cc  0.35cc  0.35cc  0.35cc  0.35cc  0.35cc  0.40cc  0.40cc  0.40cc  0.40cc  0.40cc  0.45cc  0.45cc  0.45cc  0.45cc  0.45cc  0.50cc  0.50cc  0.50cc  0.50cc  0.50cc        Exceptions to the progression noted above do occur. The injection personnel and/or your provider will decide if it is necessary to reduce, hold or slow your progression at a particular level. This decision is based on reactions, type of reactions, symptomatic response, and your comfort level. Allergy Injection Reactions  Allergy injections, just like any medication or therapy, has the potential for adverse reactions. However, with other medication reactions you may be required to stop that medication, with allergy injections we expect some degree of reaction to occur. Which isn't to say we ignore reactions, just the opposite. We pay very, very close attention to  reactions that occur from allergy injections.  We want and need for you to do the same. These reactions occur in two forms: local and systemic. This is your body's way of telling us we are progressing too fast. THIS IS NOT A RACE! Local Reactions  These are reactions that occur at the injection site and consist of itching and bumps. Depending on the size of the bump and degree of itching, your shot may be reduced slightly. After a reduction, you will resume advancing just as you did before the reaction occurred. In most instances you will progress right past this same dose without difficulty. Occasionally, some patients may have difficulty progressing through a dilution, and if so, we will slow the progression and advance you  slower by either repeating a dose or advancing in smaller amounts. Local reactions of bumps bigger than a dime in size or an uncomfortable amount of itching are required to be reported to the injection personnel before you leave the office. If the reaction gets larger or itchier after leaving the office, please report the final size to the injection personnel before your next  injection. Systemic Reaction  This reaction is life threatening. Almost all systemic reactions occur within in the first 30-minutes of receiving an allergy injection, hence the reason for the mandatory 30-minute wait in our office or any medical facility after receiving an allergy  injection. A systemic reaction may occur at any time during your allergy treatment. Even with being on your maintenance dose for several years, it is still possible (and does occur) for a systemic reaction to occur.  A systemic reaction may have any or all of the following:   Sneezing   Runny and/or itchy nose   Nasal congestion   Itchy and/or watery eyes   Itchy ears or palate (roof of the mouth)   Coughing   Shortness of breath or wheezing   Fainting   Dizziness   Itchy throat   Throat tightness   Hives or itchy skin   Flushing of the skin    If these symptoms develop in our office, please tell the injection personnel or nearest employee immediately. If these symptoms develop outside our office, you will require treatment at the nearest emergency facility immediately. USE YOUR EPIPEN OR AUVI-Q IF NECESSARY. A systemic reaction will necessitate a reduction of one full dilution in your allergy injections. Delayed Reactions  Not all reactions occur within the 30-minute waiting period. You may have a delayed reaction (local or systemic) where symptoms can develop up to 24 hours later. These reactions must be reported to us before your next injection. They are as important as reactions that occur in the office. Please make it a habit to recheck your injection site throughout the day and report any bumps that develop. (See Local and Systemic Reactions above). It's Not a Race! Immunotherapy (allergy injections) is an important medical treatment for what can be a very serious disease. Over 25% of the population is estimated to have allergic rhinitis (incidentally, 35% of these also have asthma). Studies indicate 1.5 million school days are lost each year and close to 3.4 million work days due to allergies. Prescription medications are conservatively estimated at over $5 billion dollars annually, and the annual loss in work productivity is estimated at  many millions of dollars. Immunotherapy will greatly impact these statistics, improving quality of life, decreasing overall medication costs, and improving productivity at work and school. However, we must caution you that this is not a race! Do NOT blindly proceed with the aim of the maximum concentration (1:20) as your goal. Your goal (and ours) is safety first and then symptom relief. All patients will not be able to obtain the maximum concentration level of 1:20; some sensitive patients may have a lower maintenance concentration.  The maintenance concentration (the dose at which the body is most comfortable and doesn't produce repeated severe reactions) will be individualized and adjusted as tolerated. Your maintenance concentration may be lower than the maximum concentration of 1:20 if you are a sensitive individual. A majority will obtain the 1:20 concentration after about 3-4 months of twice weekly injections. Some may never reach that level. These are generalities; you will benefit from your allergy injections at any concentration. Our goal is to get you safely to your maintenance concentration to effectively reduce your allergy symptoms. For your safety:    Accurately report all reactions, local and systemic.  Always wait the mandatory 30-minute waiting period after an injection. Are Your Allergy Injections Helping? Take a mini self-assessment test. To assess whether or not your allergy injections are beneficial, recall your symptoms or the condition that brought you to our office. For some, this may have been frequent respiratory, sinus or ear infections,  uncontrollable asthma or uncontrollable allergy symptoms (sneezing, itchy eyes, nose or palate, runny nose, watery eyes, etc.) Reassess those symptoms now; usually some improvement will have occurred in about 6-12 months. Ask yourself:   Are my allergy symptoms better?  Am I using less medication?  Am I having less frequent sinus, ear or respiratory infections?  Is my asthma under better control?  Do I feel better?  If I miss my allergy injection, do I feel worse? If you've answered yes to any of these questions, you are starting to exhibit improvement from your allergy injections. Assessing for improvement can also be accomplished by reviewing your symptom changes over the years you've been on allergy injections. Compare apples to apples when assessing for improvement. Specifically, compare same seasons (i.e. spring to spring). Compare the spring before starting allergy injections to the subsequent springs after starting allergy injections.  Your own symptoms vary from season to season so comparing fall to spring will be misleading. Frequently Asked Questions  1. Do I need to be tested again, and if so how often? Your providers may retest every 3 years. This allows for adjustment of your allergy extract. New allergies can develop and these may need to be added. 2. What do I do when I'm gone on vacations or business travel? Consistency is the one most important aspect of your injections. The more consistent you are with injections the better the response you will receive. When going on vacation or business trips try as much as possible to get an injection just prior to leaving, and as soon as possible upon return. For most short trips (< 21 days) allergy injections will be minimally affected. 3. Should I receive an injection if I'm sick? If you are ill enough not to go to work or school, postpone your shot. If you are running a fever or having asthma symptoms, do not get an injection until these symptoms have completely resolved. You may receive an injection if you are on an antibiotic or prednisone as long as any fever and/or asthma symptoms have resolved completely. 4. Should I tell my other providers that I'm on allergy injections? Yes, even though this is not a medication. There are medications that should not be given with allergy injections, so please always list your allergy injections on medication forms or whenever asked about medications. 5. How long will I be on allergy injections. The average is 3-5 years. Remaining consistent (weekly) with injections will help achieve a quicker response. Those who have few allergies may respond faster than those who have many allergies. Do not stop the injections on your own; if concerns arise please contact our office to discuss them. 6. What to do if my son/daughter is going away to college and they are on allergy injections? Allergy injections are given at most colleges.  Frequently, the student health department will give allergy injections. We will send the allergy extract on to them with complete instructions. If the institution has any questions, they are instructed to contact us before giving any allergy injection. We will then see the student for regular checkups during school breaks when they are home. 7. What do I do if I'm moving? If you are moving, you will need to contact an allergist local to the area in which you are moving. We will need their contact information to transfer your care. We will mail allergy extract to your new provider. 8. Can I, or a friend, give myself injections at home? No. This is a treatment with serious implications. Should you have a reaction you would not be able to give yourself CPR or drive yourself to an emergency room or even call for help. Injections are only given in a facility with a medical provider available. 9. How do I reorder antigen when it is all gone or ? If you receive your injections in our office, we will reorder as necessary. If you are receiving your injections at another office, see the section For Patients Receiving Allergy Shots Outside [address] [place] [address]. 10. What if I am, or want to become, pregnant? Notify us immediately, if you are or think you are pregnant. During the course of your pregnancy we prefer you stop injections. .  11. Can I get a shot if I have a rash? It may depend on the rash and location of the rash. If there is a new rash on the arms we won't be able to give you an injection. If this is a chronic condition and hasn't changed or doesn't involve the arms you can proceed with injections. In general, all new rashes (or hives) will have to be assessed on a case-by-case basis. Please feel free to call our office to discuss. 12. I always get a bump after my shot, is this significant? Yes, ALL bumps are significant. We need to know how soon after your shot they occurred, how big the final size was before they resolved, and in fact did they resolve?  This is your body's warning signal. Please do not do yourself a disservice by not reporting these bumps. PLEASE NOTIFY US IMMEDIATELY IF YOU ON THE FOLLOWING:  Beta Blocker Medications  Brand Generic  Betapace     Acebutolol  Betapace AF    Atenolol  Blocadren   Betaxolol  Brevibloc   Bisprolol   Cartrol   Carteolol  Corzide   Esmolol  Inderal    Lebetalol  Inderal LA   Metoprolol  InnoPran XL   Pindolol   Kerlone   Propranolol  Nadolol   Sotalol   Sectral    Timolol   Tenorectic  Tenormin  Timolide  Toprol-XL   Zebeta   Ziac    Opthalmic Preparations  Betaxon  Betimol  Betoptic  Cosopt  Timoptic  Timoptic XE    MAO Inhibitors  Nardil . .......................................................... Gaye Gonzalez Phenelzine sulfate  Parnate . ....................................................... Gaye Gonzalez Tranylcpromine sulfate    Patient was instructed on use of EpiPen/Auvi Q. In case of severe allergic reaction the patient had any angioedema, scratchy throat, trouble breathing, chest tightness they're to use an EpiPen. Patient to use the EpiPen/Auvi Q by injecting the vial into the lateral thigh through clothing. To hold in place for 5-10 seconds. Following use patient is to immediately go to the emergency room. EpiPen may be repeated after 5-10 minutes if no improvement. Patient emergency treatment and is stable they are to notify this office of the event. If emergent or severe allergic reactions occurs related to the injection we will need to cease or consider reducing dose. Do not keep epi in places it may become too hot or too cold and inactivates the medication. Store according to manufacture recommendations. Spent 30 minutes of face-to-face time with the patient with well more than half of the visit being dedicated to the discussion of the various symptom problems, provided education of medications and disease process, as well as discussion of a therapeutic plan for each.   Face-to-face education time does not include any time that may have been spent for procedures.     (Please note that portions of this note may have been completed with a voice recognition program.  Efforts were made to edit the dictation but occasionally words are mis-transcribed.)         Signed:  JAYLEN Trujillo CNP  7/19/2021  2:32 PM

## 2021-07-21 DIAGNOSIS — J30.1 CHRONIC ALLERGIC RHINITIS DUE TO POLLEN: Primary | ICD-10-CM

## 2021-07-21 DIAGNOSIS — Z29.8 IMMUNOTHERAPY: ICD-10-CM

## 2021-07-21 PROCEDURE — 95165 ANTIGEN THERAPY SERVICES: CPT | Performed by: NURSE PRACTITIONER

## 2021-07-21 NOTE — PROGRESS NOTES
ALLERGY EXTRACT - NEW INJECTION BUILDING PHASE    TOTAL VIALS PREPARED . .. 10 (2 SETS)  TOTAL DOSES 100 INJECTIONS (50 IN EACH SET OR 10 SHOTS PER VIAL)    An allergy extract was prepared according to written prescription by provider. Provider onsite during allergy extract preparation. Patient vial(s) include the following:     RED VIAL - 1:1 CONCENTRATION - EXPIRES 12 MONTHS  YELLOW VIAL-1:10 CONCENTRATION - EXPIRES 12 MONTHS  BLUE VIAL-1:100 CONCENTRATION - EXPIRES 12 MONTHS  GREEN VIAL-1:1,000 CONCENTRATION - EXPIRES 3 MONTHS  SILVER VIAL-1:10,000 CONCENTRATION - EXPIRES 3 MONTHS    Allergy extract was prepared by the following method:   RED TO YELLOW:  Once the  one-to-one concentration was prepared in the red vial, 0.5 ML's was then extracted in place into the yellow vial to achieve a 1:10 concentration. YELLOW TO BLUE:  Then 0.5 ML's was extracted from the yellow vial and placed into the blue file with dilutant to achieve a 1:100 concentration. BLUE TO GREEN:  Then 0.5 ML's was extracted from the blue vial and placed into Green vial with dilute to achieve a 1:1,000 concentration. GREEN TO BLUE:  Then 0.5 ML's was taken from the green vial and placed in the Silver vial with dilutant to achieve a 1:10,000 concentration. Patient vials were labeled with name, date-of-birth, vial (A,B,or C), concentration, and expiration. When injecting from a new  vial the first injections is 0.05 ml and are increased by 0.05 increments until 0.5ml from the vial is achieved or the patient reaches maximum tolerated dose. Injections are given once ot three times weekly and at least 24 hours apart, according to provider orders. If patient has complications or \"knots\" or maximum tolerance the dose building is reduced, stopped, or maintained according to patient reaction and provider orders. This is documented on the injection log. Patients on build-up plan should be seen by provider every 6 months.    The injection record and serum is reviewed and documented at every injection appointment. When down to the last 1/3 of the bottle of the red 1:1 concentration the patient should be scheduled an appointment for new orders for allergy maintenance concentrations. If it is the patients preference to receive and injection at an outside medical office, is is allowed only after the patient receives the first dose from each vial at this practice. Patients requesting refills that receive injections at outside medical facilities must include the patient's demographic sheet, current insurance information and the injection records. Allow 2-3 weeks for delivery after the refill has been requested. PROTOCOL FOR LATE INJECTIONS  Days late determined from the due date of the injection    Shot Frequency 5-10 Days Late 11-16 Days Late 17-30 Days Late 31-60 Days Late 61+ Days Late   Twice Weekly Repeat last dose Reduce last dose . 2 Reduce last dose . 4 Dilute back 1 vial, start at .05 Patient must start over     Weekly Repeat last dose Reduce last dose . 2 Reduce last dose . 4 Dilute back 1 vial, start at .05 Patient must start over   Every 2 Weeks Repeat last dose Reduce last dose . 2. Reduce last dose . 4 Ask provider for instruction Ask provider for instruction   Every 3 Weeks Repeat last dose Reduce last dose . 2 Reduce last dose . 4 Ask provider for instruction Ask provider for instruction   Every 4 Weeks Repeat last dose Reduce last dose . 2 Reduce last dose . 4 Ask provider for instruction Ask provider for instruction     Patient/gaurdian made aware of potential anaphylaxis risks associated with receiving allergy injections and wishes to proceed.   SHOT REACTION TREATMENT INSTRUCTIONS    During the 30 minute wait after an allergy injection the following symptoms should be reported:    Itching other than at the injection site  Hives or swelling other than at the injection site  Redness other than at the injection site  Difficulty breathing  Chest tightness  Difficulty swallowing  Throat tightness    If these symptoms occur, NOTIFY PROVIDER and the following treatment should be administered:    1. Epinephrine 1:1000 IM - 0.3 ml if > 66 lbs or more, 0.15 ml if 33 - 63 lbs, or 0.1 ml if <33 lbs   2. Diphenhydramine - give all intramuscular:     2 to <6 years (off-label use): 6.25 mg,    6 to <12 years: 12.5 to 25 mg;    ?12 years: 25-50 mg.  3.  Famotidine:  Adults 40 mg oral    Adolescents age 12 years and >88 lbs: 40 mg    Children and Adolescents ? 12years of age: Initial: 0.25 mg/kg/dose   every 12 hours (maximum daily dose: 40 mg/day)    Epi dose may me repeated in 5-15 minutes if adequate resolution of symptoms does not occur    Patient should be observed for at least one hour after final epi dose and must be seen by provider. Patients cannot drive themselves if they have received diphenhydramine.

## 2021-07-22 LAB — CANDIDA ANTIBODIES, QUAL: NORMAL

## 2021-08-02 ENCOUNTER — NURSE ONLY (OUTPATIENT)
Dept: ALLERGY | Age: 61
End: 2021-08-02
Payer: COMMERCIAL

## 2021-08-02 VITALS
HEART RATE: 84 BPM | HEIGHT: 65 IN | SYSTOLIC BLOOD PRESSURE: 120 MMHG | DIASTOLIC BLOOD PRESSURE: 80 MMHG | BODY MASS INDEX: 28.26 KG/M2 | RESPIRATION RATE: 12 BRPM | TEMPERATURE: 97.3 F

## 2021-08-02 DIAGNOSIS — J30.1 ALLERGY TO TREES: ICD-10-CM

## 2021-08-02 DIAGNOSIS — Z91.048 ALLERGY TO MOLD: ICD-10-CM

## 2021-08-02 DIAGNOSIS — Z51.6 ENCOUNTER FOR DESENSITIZATION TO ALLERGENS: Primary | ICD-10-CM

## 2021-08-02 PROCEDURE — 95117 IMMUNOTHERAPY INJECTIONS: CPT | Performed by: NURSE PRACTITIONER

## 2021-08-05 ENCOUNTER — NURSE ONLY (OUTPATIENT)
Dept: ALLERGY | Age: 61
End: 2021-08-05
Payer: COMMERCIAL

## 2021-08-05 VITALS
DIASTOLIC BLOOD PRESSURE: 70 MMHG | SYSTOLIC BLOOD PRESSURE: 116 MMHG | HEIGHT: 65 IN | BODY MASS INDEX: 28.26 KG/M2 | TEMPERATURE: 97.2 F | HEART RATE: 82 BPM | RESPIRATION RATE: 16 BRPM

## 2021-08-05 DIAGNOSIS — Z91.048 ALLERGY TO MOLD: Primary | ICD-10-CM

## 2021-08-05 DIAGNOSIS — Z51.6 ENCOUNTER FOR DESENSITIZATION TO ALLERGENS: ICD-10-CM

## 2021-08-05 DIAGNOSIS — J30.1 ALLERGY TO TREES: ICD-10-CM

## 2021-08-05 PROCEDURE — 95117 IMMUNOTHERAPY INJECTIONS: CPT | Performed by: NURSE PRACTITIONER

## 2021-08-06 ENCOUNTER — HOSPITAL ENCOUNTER (OUTPATIENT)
Dept: GENERAL RADIOLOGY | Age: 61
Discharge: HOME | End: 2021-08-06
Payer: COMMERCIAL

## 2021-08-06 ENCOUNTER — HOSPITAL ENCOUNTER (OUTPATIENT)
Age: 61
Discharge: HOME | End: 2021-08-06
Payer: COMMERCIAL

## 2021-08-06 DIAGNOSIS — S23.9XXA THORACIC SPRAIN: ICD-10-CM

## 2021-08-06 PROCEDURE — 72072 X-RAY EXAM THORAC SPINE 3VWS: CPT

## 2021-08-06 PROCEDURE — 71111 X-RAY EXAM RIBS/CHEST4/> VWS: CPT

## 2021-08-09 ENCOUNTER — TELEPHONE (OUTPATIENT)
Dept: ALLERGY | Age: 61
End: 2021-08-09

## 2021-08-09 ENCOUNTER — NURSE ONLY (OUTPATIENT)
Dept: ALLERGY | Age: 61
End: 2021-08-09
Payer: COMMERCIAL

## 2021-08-09 VITALS
SYSTOLIC BLOOD PRESSURE: 112 MMHG | BODY MASS INDEX: 28.26 KG/M2 | DIASTOLIC BLOOD PRESSURE: 72 MMHG | RESPIRATION RATE: 16 BRPM | HEART RATE: 76 BPM | HEIGHT: 65 IN | TEMPERATURE: 97.4 F

## 2021-08-09 DIAGNOSIS — Z51.6 ENCOUNTER FOR DESENSITIZATION TO ALLERGENS: ICD-10-CM

## 2021-08-09 DIAGNOSIS — J30.1 ALLERGY TO TREES: Primary | ICD-10-CM

## 2021-08-09 DIAGNOSIS — Z91.048 ALLERGY TO MOLD: ICD-10-CM

## 2021-08-09 PROCEDURE — 95117 IMMUNOTHERAPY INJECTIONS: CPT | Performed by: NURSE PRACTITIONER

## 2021-08-09 RX ORDER — PREDNISONE 20 MG/1
TABLET ORAL
COMMUNITY
Start: 2021-08-05 | End: 2021-08-12 | Stop reason: ALTCHOICE

## 2021-08-09 NOTE — PROGRESS NOTES
After consent obtained/verified, allergy injection given in back of R/L arm(s). VIAL COLOR OF ALL VIALS TODAY IS Silver    ALLERGY INJECTION FROM VIAL A GIVEN Left  UPPER ARM IN THE AMOUNT OF 0.15 ML    ALLERGY INJECTION FROM VIAL B GIVEN Right UPPER ARM IN THE AMOUNT OF 0.15 ML    Documentation of vial injection specific to arm(s) noted on Allergy Immunotherapy Administration Form. Patient waited 30 minutes for observation. Patient tolerated well without adverse reaction. SHOT REACTION TREATMENT INSTRUCTIONS    During the 30 minute wait after an allergy injection the following symptoms should be reported:    Itching other than at the injection site  Hives or swelling other than at the injection site  Redness other than at the injection site  Difficulty breathing  Chest tightness  Difficulty swallowing  Throat tightness    If these symptoms occur, NOTIFY PROVIDER and the following treatment should be administered:    1. Epinephrine/Auvi Q 1:1000 IM - 0.3 ml if > 66 lbs or more, 0.15 ml if 33 - 63 lbs, or 0.1 ml if <33 lbs     2. Diphenhydramine - give all intramuscular:     2 to <6 years (off-label use): 6.25 mg,    6 to <12 years: 12.5 to 25 mg;    ?12 years: 25-50 mg.    3.  Famotidine:  Adults 40 mg oral    Adolescents age 12 years and >88 lbs: 40 mg    Children and Adolescents ? 12years of age: Initial: 0.25 mg/kg/dose  every 12 hours (maximum daily dose: 40 mg/day)    Epi/Auvi Q dose may me repeated in 5-15 minutes if adequate resolution of symptoms does not occur    Patient should be observed for at least one hour after final Epi/Auvi Q dose and must be seen by provider. Patients cannot drive themselves if they have received diphenhydramine.

## 2021-08-12 ENCOUNTER — NURSE ONLY (OUTPATIENT)
Dept: ALLERGY | Age: 61
End: 2021-08-12
Payer: COMMERCIAL

## 2021-08-12 VITALS — HEART RATE: 76 BPM | RESPIRATION RATE: 14 BRPM | SYSTOLIC BLOOD PRESSURE: 124 MMHG | DIASTOLIC BLOOD PRESSURE: 70 MMHG

## 2021-08-12 DIAGNOSIS — Z51.6 ENCOUNTER FOR DESENSITIZATION TO ALLERGENS: Primary | ICD-10-CM

## 2021-08-12 DIAGNOSIS — Z91.048 ALLERGY TO MOLD: ICD-10-CM

## 2021-08-12 DIAGNOSIS — J30.1 ALLERGY TO TREES: ICD-10-CM

## 2021-08-12 PROCEDURE — 95117 IMMUNOTHERAPY INJECTIONS: CPT | Performed by: NURSE PRACTITIONER

## 2021-08-16 ENCOUNTER — NURSE ONLY (OUTPATIENT)
Dept: ALLERGY | Age: 61
End: 2021-08-16
Payer: COMMERCIAL

## 2021-08-16 VITALS
TEMPERATURE: 97.2 F | RESPIRATION RATE: 16 BRPM | HEART RATE: 74 BPM | DIASTOLIC BLOOD PRESSURE: 84 MMHG | SYSTOLIC BLOOD PRESSURE: 134 MMHG

## 2021-08-16 DIAGNOSIS — Z91.048 ALLERGY TO MOLD: ICD-10-CM

## 2021-08-16 DIAGNOSIS — Z51.6 ENCOUNTER FOR DESENSITIZATION TO ALLERGENS: Primary | ICD-10-CM

## 2021-08-16 DIAGNOSIS — J30.1 ALLERGY TO TREES: ICD-10-CM

## 2021-08-16 PROCEDURE — 95117 IMMUNOTHERAPY INJECTIONS: CPT | Performed by: NURSE PRACTITIONER

## 2021-08-16 NOTE — PROGRESS NOTES
After consent obtained/verified, allergy injection given in back of R/L arm(s). VIAL COLOR OF ALL VIALS TODAY IS silver    ALLERGY INJECTION FROM VIAL A GIVEN left  UPPER ARM IN THE AMOUNT OF 0.25 ML    ALLERGY INJECTION FROM VIAL B GIVEN right UPPER ARM IN THE AMOUNT OF 0.25 ML    Documentation of vial injection specific to arm(s) noted on Allergy Immunotherapy Administration Form. Patient declined to wait for 30 minutes       SHOT REACTION TREATMENT INSTRUCTIONS    During the 30 minute wait after an allergy injection the following symptoms should be reported:    Itching other than at the injection site  Hives or swelling other than at the injection site  Redness other than at the injection site  Difficulty breathing  Chest tightness  Difficulty swallowing  Throat tightness    If these symptoms occur, NOTIFY PROVIDER and the following treatment should be administered:    1. Epinephrine/Auvi Q 1:1000 IM - 0.3 ml if > 66 lbs or more, 0.15 ml if 33 - 63 lbs, or 0.1 ml if <33 lbs     2. Diphenhydramine - give all intramuscular:     2 to <6 years (off-label use): 6.25 mg,    6 to <12 years: 12.5 to 25 mg;    ?12 years: 25-50 mg.    3.  Famotidine:  Adults 40 mg oral    Adolescents age 12 years and >88 lbs: 40 mg    Children and Adolescents ? 12years of age: Initial: 0.25 mg/kg/dose  every 12 hours (maximum daily dose: 40 mg/day)    Epi/Auvi Q dose may me repeated in 5-15 minutes if adequate resolution of symptoms does not occur    Patient should be observed for at least one hour after final Epi/Auvi Q dose and must be seen by provider. Patients cannot drive themselves if they have received diphenhydramine.
36.9

## 2021-08-19 ENCOUNTER — NURSE ONLY (OUTPATIENT)
Dept: ALLERGY | Age: 61
End: 2021-08-19
Payer: COMMERCIAL

## 2021-08-19 VITALS
DIASTOLIC BLOOD PRESSURE: 64 MMHG | HEART RATE: 82 BPM | TEMPERATURE: 96.9 F | RESPIRATION RATE: 14 BRPM | SYSTOLIC BLOOD PRESSURE: 110 MMHG

## 2021-08-19 DIAGNOSIS — J30.1 ALLERGY TO TREES: ICD-10-CM

## 2021-08-19 DIAGNOSIS — Z51.6 ENCOUNTER FOR DESENSITIZATION TO ALLERGENS: Primary | ICD-10-CM

## 2021-08-19 DIAGNOSIS — Z91.048 ALLERGY TO MOLD: ICD-10-CM

## 2021-08-19 PROCEDURE — 95117 IMMUNOTHERAPY INJECTIONS: CPT | Performed by: NURSE PRACTITIONER

## 2021-08-19 NOTE — PROGRESS NOTES
After consent obtained/verified, allergy injection given in back of R/L arm(s). VIAL COLOR OF ALL VIALS TODAY IS SILVER    ALLERGY INJECTION FROM VIAL A GIVEN RIGHT  UPPER ARM IN THE AMOUNT OF 0.30 ML    ALLERGY INJECTION FROM VIAL B GIVEN LEFT UPPER ARM IN THE AMOUNT OF 0.30 ML        Documentation of vial injection specific to arm(s) noted on Allergy Immunotherapy Administration Form. Patient declined to wait for 30 minutes . SHOT REACTION TREATMENT INSTRUCTIONS    During the 30 minute wait after an allergy injection the following symptoms should be reported:    Itching other than at the injection site  Hives or swelling other than at the injection site  Redness other than at the injection site  Difficulty breathing  Chest tightness  Difficulty swallowing  Throat tightness    If these symptoms occur, NOTIFY PROVIDER and the following treatment should be administered:    1. Epinephrine/Auvi Q 1:1000 IM - 0.3 ml if > 66 lbs or more, 0.15 ml if 33 - 63 lbs, or 0.1 ml if <33 lbs     2. Diphenhydramine - give all intramuscular:     2 to <6 years (off-label use): 6.25 mg,    6 to <12 years: 12.5 to 25 mg;    ?12 years: 25-50 mg.    3.  Famotidine:  Adults 40 mg oral    Adolescents age 12 years and >88 lbs: 40 mg    Children and Adolescents ? 12years of age: Initial: 0.25 mg/kg/dose  every 12 hours (maximum daily dose: 40 mg/day)    Epi/Auvi Q dose may me repeated in 5-15 minutes if adequate resolution of symptoms does not occur    Patient should be observed for at least one hour after final Epi/Auvi Q dose and must be seen by provider. Patients cannot drive themselves if they have received diphenhydramine.

## 2021-08-23 ENCOUNTER — NURSE ONLY (OUTPATIENT)
Dept: ALLERGY | Age: 61
End: 2021-08-23
Payer: COMMERCIAL

## 2021-08-23 VITALS
HEART RATE: 76 BPM | TEMPERATURE: 97.6 F | SYSTOLIC BLOOD PRESSURE: 118 MMHG | RESPIRATION RATE: 14 BRPM | DIASTOLIC BLOOD PRESSURE: 70 MMHG

## 2021-08-23 DIAGNOSIS — J30.1 ALLERGY TO TREES: ICD-10-CM

## 2021-08-23 DIAGNOSIS — Z91.048 ALLERGY TO MOLD: ICD-10-CM

## 2021-08-23 DIAGNOSIS — Z51.6 ENCOUNTER FOR DESENSITIZATION TO ALLERGENS: Primary | ICD-10-CM

## 2021-08-23 PROCEDURE — 95117 IMMUNOTHERAPY INJECTIONS: CPT | Performed by: NURSE PRACTITIONER

## 2021-08-23 NOTE — PROGRESS NOTES
After consent obtained/verified, allergy injection given in back of R/L arm(s). VIAL COLOR OF ALL VIALS TODAY IS SILVER    ALLERGY INJECTION FROM VIAL A GIVEN LEFT  UPPER ARM IN THE AMOUNT OF 0.35 ML    ALLERGY INJECTION FROM VIAL B GIVEN RIGHT UPPER ARM IN THE AMOUNT OF 0.35 ML      Documentation of vial injection specific to arm(s) noted on Allergy Immunotherapy Administration Form. Patient declined to wait for 30 minutes            SHOT REACTION TREATMENT INSTRUCTIONS    During the 30 minute wait after an allergy injection the following symptoms should be reported:    Itching other than at the injection site  Hives or swelling other than at the injection site  Redness other than at the injection site  Difficulty breathing  Chest tightness  Difficulty swallowing  Throat tightness    If these symptoms occur, NOTIFY PROVIDER and the following treatment should be administered:    1. Epinephrine/Auvi Q 1:1000 IM - 0.3 ml if > 66 lbs or more, 0.15 ml if 33 - 63 lbs, or 0.1 ml if <33 lbs     2. Diphenhydramine - give all intramuscular:     2 to <6 years (off-label use): 6.25 mg,    6 to <12 years: 12.5 to 25 mg;    ?12 years: 25-50 mg.    3.  Famotidine:  Adults 40 mg oral    Adolescents age 12 years and >88 lbs: 40 mg    Children and Adolescents ? 12years of age: Initial: 0.25 mg/kg/dose  every 12 hours (maximum daily dose: 40 mg/day)    Epi/Auvi Q dose may me repeated in 5-15 minutes if adequate resolution of symptoms does not occur    Patient should be observed for at least one hour after final Epi/Auvi Q dose and must be seen by provider. Patients cannot drive themselves if they have received diphenhydramine.

## 2021-08-26 ENCOUNTER — NURSE ONLY (OUTPATIENT)
Dept: ALLERGY | Age: 61
End: 2021-08-26
Payer: COMMERCIAL

## 2021-08-26 VITALS
OXYGEN SATURATION: 97 % | SYSTOLIC BLOOD PRESSURE: 118 MMHG | RESPIRATION RATE: 16 BRPM | DIASTOLIC BLOOD PRESSURE: 74 MMHG | HEART RATE: 96 BPM | TEMPERATURE: 97.1 F

## 2021-08-26 DIAGNOSIS — J30.1 ALLERGY TO TREES: ICD-10-CM

## 2021-08-26 DIAGNOSIS — Z91.048 ALLERGY TO MOLD: ICD-10-CM

## 2021-08-26 DIAGNOSIS — Z51.6 ENCOUNTER FOR DESENSITIZATION TO ALLERGENS: Primary | ICD-10-CM

## 2021-08-26 PROCEDURE — 95117 IMMUNOTHERAPY INJECTIONS: CPT | Performed by: NURSE PRACTITIONER

## 2021-08-30 ENCOUNTER — NURSE ONLY (OUTPATIENT)
Dept: ALLERGY | Age: 61
End: 2021-08-30
Payer: COMMERCIAL

## 2021-08-30 VITALS — DIASTOLIC BLOOD PRESSURE: 76 MMHG | HEART RATE: 80 BPM | SYSTOLIC BLOOD PRESSURE: 118 MMHG | TEMPERATURE: 96.4 F

## 2021-08-30 DIAGNOSIS — J30.1 ALLERGY TO TREES: ICD-10-CM

## 2021-08-30 DIAGNOSIS — Z91.048 ALLERGY TO MOLD: ICD-10-CM

## 2021-08-30 DIAGNOSIS — Z51.6 ENCOUNTER FOR DESENSITIZATION TO ALLERGENS: Primary | ICD-10-CM

## 2021-08-30 PROCEDURE — 95117 IMMUNOTHERAPY INJECTIONS: CPT | Performed by: NURSE PRACTITIONER

## 2021-09-02 ENCOUNTER — NURSE ONLY (OUTPATIENT)
Dept: ALLERGY | Age: 61
End: 2021-09-02
Payer: COMMERCIAL

## 2021-09-02 VITALS
TEMPERATURE: 97.1 F | OXYGEN SATURATION: 96 % | HEART RATE: 84 BPM | SYSTOLIC BLOOD PRESSURE: 110 MMHG | DIASTOLIC BLOOD PRESSURE: 76 MMHG | RESPIRATION RATE: 14 BRPM

## 2021-09-02 DIAGNOSIS — Z51.6 ENCOUNTER FOR DESENSITIZATION TO ALLERGENS: Primary | ICD-10-CM

## 2021-09-02 DIAGNOSIS — J30.1 ALLERGY TO TREES: ICD-10-CM

## 2021-09-02 DIAGNOSIS — Z91.048 ALLERGY TO MOLD: ICD-10-CM

## 2021-09-02 PROCEDURE — 95117 IMMUNOTHERAPY INJECTIONS: CPT | Performed by: NURSE PRACTITIONER

## 2021-09-02 NOTE — PROGRESS NOTES
After consent obtained/verified, allergy injection given in back of R/L arm(s). VIAL COLOR OF ALL VIALS TODAY IS SILVER    ALLERGY INJECTION FROM VIAL A GIVEN RIGHT  UPPER ARM IN THE AMOUNT OF 0.50 ML    ALLERGY INJECTION FROM VIAL B GIVEN LEFT UPPER ARM IN THE AMOUNT OF 0.50 ML        Documentation of vial injection specific to arm(s) noted on Allergy Immunotherapy Administration Form. Patient declined to wait for 30 minutes. SHOT REACTION TREATMENT INSTRUCTIONS    During the 30 minute wait after an allergy injection the following symptoms should be reported:    Itching other than at the injection site  Hives or swelling other than at the injection site  Redness other than at the injection site  Difficulty breathing  Chest tightness  Difficulty swallowing  Throat tightness    If these symptoms occur, NOTIFY PROVIDER and the following treatment should be administered:    1. Epinephrine/Auvi Q 1:1000 IM - 0.3 ml if > 66 lbs or more, 0.15 ml if 33 - 63 lbs, or 0.1 ml if <33 lbs     2. Diphenhydramine - give all intramuscular:     2 to <6 years (off-label use): 6.25 mg,    6 to <12 years: 12.5 to 25 mg;    ?12 years: 25-50 mg.    3.  Famotidine:  Adults 40 mg oral    Adolescents age 12 years and >88 lbs: 40 mg    Children and Adolescents ? 12years of age: Initial: 0.25 mg/kg/dose  every 12 hours (maximum daily dose: 40 mg/day)    Epi/Auvi Q dose may me repeated in 5-15 minutes if adequate resolution of symptoms does not occur    Patient should be observed for at least one hour after final Epi/Auvi Q dose and must be seen by provider. Patients cannot drive themselves if they have received diphenhydramine.

## 2021-09-09 ENCOUNTER — NURSE ONLY (OUTPATIENT)
Dept: ALLERGY | Age: 61
End: 2021-09-09
Payer: COMMERCIAL

## 2021-09-09 VITALS
SYSTOLIC BLOOD PRESSURE: 118 MMHG | OXYGEN SATURATION: 98 % | TEMPERATURE: 97.7 F | HEART RATE: 95 BPM | RESPIRATION RATE: 16 BRPM | DIASTOLIC BLOOD PRESSURE: 82 MMHG

## 2021-09-09 DIAGNOSIS — Z91.048 ALLERGY TO MOLD: ICD-10-CM

## 2021-09-09 DIAGNOSIS — J30.1 ALLERGY TO TREES: ICD-10-CM

## 2021-09-09 DIAGNOSIS — Z51.6 ENCOUNTER FOR DESENSITIZATION TO ALLERGENS: Primary | ICD-10-CM

## 2021-09-09 PROCEDURE — 95117 IMMUNOTHERAPY INJECTIONS: CPT | Performed by: NURSE PRACTITIONER

## 2021-09-09 NOTE — PROGRESS NOTES
After consent obtained/verified, allergy injection given in back of R/L arm(s). VIAL COLOR OF ALL VIALS TODAY IS GREEN    ALLERGY INJECTION FROM VIAL A GIVEN LEFT  UPPER ARM IN THE AMOUNT OF 0.05 ML    ALLERGY INJECTION FROM VIAL B GIVEN RIGHT UPPER ARM IN THE AMOUNT OF 0.05 ML        Documentation of vial injection specific to arm(s) noted on Allergy Immunotherapy Administration Form. Patient declined to wait for 30 minutes. SHOT REACTION TREATMENT INSTRUCTIONS    During the 30 minute wait after an allergy injection the following symptoms should be reported:    Itching other than at the injection site  Hives or swelling other than at the injection site  Redness other than at the injection site  Difficulty breathing  Chest tightness  Difficulty swallowing  Throat tightness    If these symptoms occur, NOTIFY PROVIDER and the following treatment should be administered:    1. Epinephrine/Auvi Q 1:1000 IM - 0.3 ml if > 66 lbs or more, 0.15 ml if 33 - 63 lbs, or 0.1 ml if <33 lbs     2. Diphenhydramine - give all intramuscular:     2 to <6 years (off-label use): 6.25 mg,    6 to <12 years: 12.5 to 25 mg;    ?12 years: 25-50 mg.    3.  Famotidine:  Adults 40 mg oral    Adolescents age 12 years and >88 lbs: 40 mg    Children and Adolescents ? 12years of age: Initial: 0.25 mg/kg/dose  every 12 hours (maximum daily dose: 40 mg/day)    Epi/Auvi Q dose may me repeated in 5-15 minutes if adequate resolution of symptoms does not occur    Patient should be observed for at least one hour after final Epi/Auvi Q dose and must be seen by provider. Patients cannot drive themselves if they have received diphenhydramine.

## 2021-09-13 ENCOUNTER — NURSE ONLY (OUTPATIENT)
Dept: ALLERGY | Age: 61
End: 2021-09-13
Payer: COMMERCIAL

## 2021-09-13 VITALS
RESPIRATION RATE: 16 BRPM | SYSTOLIC BLOOD PRESSURE: 116 MMHG | DIASTOLIC BLOOD PRESSURE: 78 MMHG | TEMPERATURE: 97.2 F | HEART RATE: 72 BPM

## 2021-09-13 DIAGNOSIS — Z51.6 ENCOUNTER FOR DESENSITIZATION TO ALLERGENS: Primary | ICD-10-CM

## 2021-09-13 DIAGNOSIS — J30.1 ALLERGY TO TREES: ICD-10-CM

## 2021-09-13 DIAGNOSIS — Z91.048 ALLERGY TO MOLD: ICD-10-CM

## 2021-09-13 PROCEDURE — 95117 IMMUNOTHERAPY INJECTIONS: CPT | Performed by: NURSE PRACTITIONER

## 2021-09-13 NOTE — PROGRESS NOTES
After consent obtained/verified, allergy injection given in back of R/L arm(s). VIAL COLOR OF ALL VIALS TODAY IS GREEN    ALLERGY INJECTION FROM VIAL A GIVEN RIGHT  UPPER ARM IN THE AMOUNT OF 0.10 ML    ALLERGY INJECTION FROM VIAL B GIVEN LEFT UPPER ARM IN THE AMOUNT OF 0.10 ML        Documentation of vial injection specific to arm(s) noted on Allergy Immunotherapy Administration Form. Patient declined to wait for 30 minutes. SHOT REACTION TREATMENT INSTRUCTIONS    During the 30 minute wait after an allergy injection the following symptoms should be reported:    Itching other than at the injection site  Hives or swelling other than at the injection site  Redness other than at the injection site  Difficulty breathing  Chest tightness  Difficulty swallowing  Throat tightness    If these symptoms occur, NOTIFY PROVIDER and the following treatment should be administered:    1. Epinephrine/Auvi Q 1:1000 IM - 0.3 ml if > 66 lbs or more, 0.15 ml if 33 - 63 lbs, or 0.1 ml if <33 lbs     2. Diphenhydramine - give all intramuscular:     2 to <6 years (off-label use): 6.25 mg,    6 to <12 years: 12.5 to 25 mg;    ?12 years: 25-50 mg.    3.  Famotidine:  Adults 40 mg oral    Adolescents age 12 years and >88 lbs: 40 mg    Children and Adolescents ? 12years of age: Initial: 0.25 mg/kg/dose  every 12 hours (maximum daily dose: 40 mg/day)    Epi/Auvi Q dose may me repeated in 5-15 minutes if adequate resolution of symptoms does not occur    Patient should be observed for at least one hour after final Epi/Auvi Q dose and must be seen by provider. Patients cannot drive themselves if they have received diphenhydramine.

## 2021-09-16 ENCOUNTER — NURSE ONLY (OUTPATIENT)
Dept: ALLERGY | Age: 61
End: 2021-09-16
Payer: COMMERCIAL

## 2021-09-16 VITALS
TEMPERATURE: 97 F | HEART RATE: 93 BPM | RESPIRATION RATE: 16 BRPM | SYSTOLIC BLOOD PRESSURE: 116 MMHG | OXYGEN SATURATION: 97 % | DIASTOLIC BLOOD PRESSURE: 74 MMHG

## 2021-09-16 DIAGNOSIS — J30.1 ALLERGY TO TREES: ICD-10-CM

## 2021-09-16 DIAGNOSIS — Z51.6 ENCOUNTER FOR DESENSITIZATION TO POLLEN ALLERGEN: ICD-10-CM

## 2021-09-16 DIAGNOSIS — Z91.09 ENCOUNTER FOR DESENSITIZATION TO POLLEN ALLERGEN: ICD-10-CM

## 2021-09-16 DIAGNOSIS — Z91.048 ALLERGY TO MOLD: Primary | ICD-10-CM

## 2021-09-16 PROCEDURE — 95117 IMMUNOTHERAPY INJECTIONS: CPT | Performed by: NURSE PRACTITIONER

## 2021-09-16 NOTE — PROGRESS NOTES
After consent obtained/verified, allergy injection given in back of R/L arm(s). VIAL COLOR OF ALL VIALS TODAY IS Green    ALLERGY INJECTION FROM VIAL A GIVEN Left  UPPER ARM IN THE AMOUNT OF 0.15 ML    ALLERGY INJECTION FROM VIAL B GIVEN Right UPPER ARM IN THE AMOUNT OF 0.15 ML      Documentation of vial injection specific to arm(s) noted on Allergy Immunotherapy Administration Form. Patient declined to wait for 30 minutes           SHOT REACTION TREATMENT INSTRUCTIONS    During the 30 minute wait after an allergy injection the following symptoms should be reported:    Itching other than at the injection site  Hives or swelling other than at the injection site  Redness other than at the injection site  Difficulty breathing  Chest tightness  Difficulty swallowing  Throat tightness    If these symptoms occur, NOTIFY PROVIDER and the following treatment should be administered:    1. Epinephrine/Auvi Q 1:1000 IM - 0.3 ml if > 66 lbs or more, 0.15 ml if 33 - 63 lbs, or 0.1 ml if <33 lbs     2. Diphenhydramine - give all intramuscular:     2 to <6 years (off-label use): 6.25 mg,    6 to <12 years: 12.5 to 25 mg;    ?12 years: 25-50 mg.    3.  Famotidine:  Adults 40 mg oral    Adolescents age 12 years and >88 lbs: 40 mg    Children and Adolescents ? 12years of age: Initial: 0.25 mg/kg/dose  every 12 hours (maximum daily dose: 40 mg/day)    Epi/Auvi Q dose may me repeated in 5-15 minutes if adequate resolution of symptoms does not occur    Patient should be observed for at least one hour after final Epi/Auvi Q dose and must be seen by provider. Patients cannot drive themselves if they have received diphenhydramine.

## 2021-09-23 ENCOUNTER — TELEPHONE (OUTPATIENT)
Dept: ENT CLINIC | Age: 61
End: 2021-09-23

## 2021-09-23 ENCOUNTER — NURSE ONLY (OUTPATIENT)
Dept: ALLERGY | Age: 61
End: 2021-09-23
Payer: COMMERCIAL

## 2021-09-23 VITALS — DIASTOLIC BLOOD PRESSURE: 74 MMHG | HEART RATE: 80 BPM | SYSTOLIC BLOOD PRESSURE: 114 MMHG

## 2021-09-23 DIAGNOSIS — J30.1 ALLERGY TO TREES: ICD-10-CM

## 2021-09-23 DIAGNOSIS — J30.89 SEASONAL ALLERGIC RHINITIS DUE TO FUNGAL SPORES: ICD-10-CM

## 2021-09-23 DIAGNOSIS — Z51.6 ENCOUNTER FOR DESENSITIZATION TO POLLEN ALLERGEN: Primary | ICD-10-CM

## 2021-09-23 DIAGNOSIS — Z91.09 ENCOUNTER FOR DESENSITIZATION TO POLLEN ALLERGEN: Primary | ICD-10-CM

## 2021-09-23 DIAGNOSIS — Z91.048 ALLERGY TO MOLD: ICD-10-CM

## 2021-09-23 PROCEDURE — 95117 IMMUNOTHERAPY INJECTIONS: CPT | Performed by: NURSE PRACTITIONER

## 2021-09-23 NOTE — TELEPHONE ENCOUNTER
Patient verbalized understanding and thanked me.
Patient was in today for injections and asked is she is allergic to milk or just have an intolerance where she could take a pill?     Please advise
wallet/Clothing/Cell Phone/PDA (specify)

## 2021-09-23 NOTE — PROGRESS NOTES
After consent obtained/verified, allergy injection given in back of R/L arm(s). VIAL COLOR OF ALL VIALS TODAY IS green    ALLERGY INJECTION FROM VIAL A GIVEN right  UPPER ARM IN THE AMOUNT OF 0.20 ML    ALLERGY INJECTION FROM VIAL B GIVEN left UPPER ARM IN THE AMOUNT OF 0.20 ML      Documentation of vial injection specific to arm(s) noted on Allergy Immunotherapy Administration Form. Patient declined to wait for 30 minutes       SHOT REACTION TREATMENT INSTRUCTIONS    During the 30 minute wait after an allergy injection the following symptoms should be reported:    Itching other than at the injection site  Hives or swelling other than at the injection site  Redness other than at the injection site  Difficulty breathing  Chest tightness  Difficulty swallowing  Throat tightness    If these symptoms occur, NOTIFY PROVIDER and the following treatment should be administered:    1. Epinephrine/Auvi Q 1:1000 IM - 0.3 ml if > 66 lbs or more, 0.15 ml if 33 - 63 lbs, or 0.1 ml if <33 lbs     2. Diphenhydramine - give all intramuscular:     2 to <6 years (off-label use): 6.25 mg,    6 to <12 years: 12.5 to 25 mg;    ?12 years: 25-50 mg.    3.  Famotidine:  Adults 40 mg oral    Adolescents age 12 years and >88 lbs: 40 mg    Children and Adolescents ? 12years of age: Initial: 0.25 mg/kg/dose  every 12 hours (maximum daily dose: 40 mg/day)    Epi/Auvi Q dose may me repeated in 5-15 minutes if adequate resolution of symptoms does not occur    Patient should be observed for at least one hour after final Epi/Auvi Q dose and must be seen by provider. Patients cannot drive themselves if they have received diphenhydramine.

## 2021-09-27 ENCOUNTER — NURSE ONLY (OUTPATIENT)
Dept: ALLERGY | Age: 61
End: 2021-09-27
Payer: COMMERCIAL

## 2021-09-27 VITALS — TEMPERATURE: 97.3 F | HEART RATE: 85 BPM | SYSTOLIC BLOOD PRESSURE: 116 MMHG | DIASTOLIC BLOOD PRESSURE: 80 MMHG

## 2021-09-27 DIAGNOSIS — J30.1 ALLERGY TO TREES: ICD-10-CM

## 2021-09-27 DIAGNOSIS — Z91.09 ENCOUNTER FOR DESENSITIZATION TO POLLEN ALLERGEN: Primary | ICD-10-CM

## 2021-09-27 DIAGNOSIS — Z51.6 ENCOUNTER FOR DESENSITIZATION TO POLLEN ALLERGEN: Primary | ICD-10-CM

## 2021-09-27 DIAGNOSIS — Z91.048 ALLERGY TO MOLD: ICD-10-CM

## 2021-09-27 PROCEDURE — 95117 IMMUNOTHERAPY INJECTIONS: CPT | Performed by: NURSE PRACTITIONER

## 2021-09-27 NOTE — PROGRESS NOTES
After consent obtained/verified, allergy injection given in back of R/L arm(s). VIAL COLOR OF ALL VIALS TODAY IS green    ALLERGY INJECTION FROM VIAL A GIVEN left  UPPER ARM IN THE AMOUNT OF 0.25 ML    ALLERGY INJECTION FROM VIAL B GIVEN right UPPER ARM IN THE AMOUNT OF 0.25 ML      Documentation of vial injection specific to arm(s) noted on Allergy Immunotherapy Administration Form. Patient declined to wait for 30 minutes     SHOT REACTION TREATMENT INSTRUCTIONS    During the 30 minute wait after an allergy injection the following symptoms should be reported:    Itching other than at the injection site  Hives or swelling other than at the injection site  Redness other than at the injection site  Difficulty breathing  Chest tightness  Difficulty swallowing  Throat tightness    If these symptoms occur, NOTIFY PROVIDER and the following treatment should be administered:    1. Epinephrine/Auvi Q 1:1000 IM - 0.3 ml if > 66 lbs or more, 0.15 ml if 33 - 63 lbs, or 0.1 ml if <33 lbs     2. Diphenhydramine - give all intramuscular:     2 to <6 years (off-label use): 6.25 mg,    6 to <12 years: 12.5 to 25 mg;    ?12 years: 25-50 mg.    3.  Famotidine:  Adults 40 mg oral    Adolescents age 12 years and >88 lbs: 40 mg    Children and Adolescents ? 12years of age: Initial: 0.25 mg/kg/dose  every 12 hours (maximum daily dose: 40 mg/day)    Epi/Auvi Q dose may me repeated in 5-15 minutes if adequate resolution of symptoms does not occur    Patient should be observed for at least one hour after final Epi/Auvi Q dose and must be seen by provider. Patients cannot drive themselves if they have received diphenhydramine.

## 2021-09-30 ENCOUNTER — NURSE ONLY (OUTPATIENT)
Dept: ALLERGY | Age: 61
End: 2021-09-30
Payer: COMMERCIAL

## 2021-09-30 VITALS
HEART RATE: 52 BPM | RESPIRATION RATE: 20 BRPM | SYSTOLIC BLOOD PRESSURE: 100 MMHG | TEMPERATURE: 97.5 F | DIASTOLIC BLOOD PRESSURE: 70 MMHG

## 2021-09-30 DIAGNOSIS — J30.89 SEASONAL ALLERGIC RHINITIS DUE TO FUNGAL SPORES: ICD-10-CM

## 2021-09-30 DIAGNOSIS — J30.1 NON-SEASONAL ALLERGIC RHINITIS DUE TO POLLEN: ICD-10-CM

## 2021-09-30 DIAGNOSIS — J30.1 ALLERGY TO TREES: ICD-10-CM

## 2021-09-30 DIAGNOSIS — Z91.048 ALLERGY TO MOLD: ICD-10-CM

## 2021-09-30 DIAGNOSIS — Z51.6 ENCOUNTER FOR DESENSITIZATION TO POLLEN ALLERGEN: Primary | ICD-10-CM

## 2021-09-30 DIAGNOSIS — Z91.09 ENCOUNTER FOR DESENSITIZATION TO POLLEN ALLERGEN: Primary | ICD-10-CM

## 2021-09-30 PROCEDURE — 95117 IMMUNOTHERAPY INJECTIONS: CPT | Performed by: NURSE PRACTITIONER

## 2021-09-30 NOTE — PROGRESS NOTES
After consent obtained/verified, allergy injection given in back of R/L arm(s). VIAL COLOR OF ALL VIALS TODAY IS GREEN     ALLERGY INJECTION FROM VIAL A GIVEN RIGHT  UPPER ARM IN THE AMOUNT OF 0.30 ML    ALLERGY INJECTION FROM VIAL B GIVEN LEFT UPPER ARM IN THE AMOUNT OF 0.30 ML      Documentation of vial injection specific to arm(s) noted on Allergy Immunotherapy Administration Form. Patient declined to wait for 30 minutes              SHOT REACTION TREATMENT INSTRUCTIONS    During the 30 minute wait after an allergy injection the following symptoms should be reported:    Itching other than at the injection site  Hives or swelling other than at the injection site  Redness other than at the injection site  Difficulty breathing  Chest tightness  Difficulty swallowing  Throat tightness    If these symptoms occur, NOTIFY PROVIDER and the following treatment should be administered:    1. Epinephrine/Auvi Q 1:1000 IM - 0.3 ml if > 66 lbs or more, 0.15 ml if 33 - 63 lbs, or 0.1 ml if <33 lbs     2. Diphenhydramine - give all intramuscular:     2 to <6 years (off-label use): 6.25 mg,    6 to <12 years: 12.5 to 25 mg;    ?12 years: 25-50 mg.    3.  Famotidine:  Adults 40 mg oral    Adolescents age 12 years and >88 lbs: 40 mg    Children and Adolescents ? 12years of age: Initial: 0.25 mg/kg/dose  every 12 hours (maximum daily dose: 40 mg/day)    Epi/Auvi Q dose may me repeated in 5-15 minutes if adequate resolution of symptoms does not occur    Patient should be observed for at least one hour after final Epi/Auvi Q dose and must be seen by provider. Patients cannot drive themselves if they have received diphenhydramine.

## 2021-10-04 ENCOUNTER — NURSE ONLY (OUTPATIENT)
Dept: ALLERGY | Age: 61
End: 2021-10-04
Payer: COMMERCIAL

## 2021-10-04 VITALS
HEART RATE: 64 BPM | SYSTOLIC BLOOD PRESSURE: 128 MMHG | RESPIRATION RATE: 14 BRPM | DIASTOLIC BLOOD PRESSURE: 80 MMHG | TEMPERATURE: 96.9 F

## 2021-10-04 DIAGNOSIS — Z91.048 ALLERGY TO MOLD: Primary | ICD-10-CM

## 2021-10-04 DIAGNOSIS — J30.1 ALLERGY TO TREES: ICD-10-CM

## 2021-10-04 DIAGNOSIS — Z91.09 ENCOUNTER FOR DESENSITIZATION TO POLLEN ALLERGEN: ICD-10-CM

## 2021-10-04 DIAGNOSIS — Z51.6 ENCOUNTER FOR DESENSITIZATION TO POLLEN ALLERGEN: ICD-10-CM

## 2021-10-04 PROCEDURE — 95117 IMMUNOTHERAPY INJECTIONS: CPT | Performed by: NURSE PRACTITIONER

## 2021-10-04 NOTE — PROGRESS NOTES
After consent obtained/verified, allergy injection given in back of R/L arm(s). VIAL COLOR OF ALL VIALS TODAY IS Green    ALLERGY INJECTION FROM VIAL A GIVEN Left  UPPER ARM IN THE AMOUNT OF 0.35 ML    ALLERGY INJECTION FROM VIAL B GIVEN Right UPPER ARM IN THE AMOUNT OF 0.35 ML    Documentation of vial injection specific to arm(s) noted on Allergy Immunotherapy Administration Form. Patient declined to wait for 30 minutes       SHOT REACTION TREATMENT INSTRUCTIONS    During the 30 minute wait after an allergy injection the following symptoms should be reported:    Itching other than at the injection site  Hives or swelling other than at the injection site  Redness other than at the injection site  Difficulty breathing  Chest tightness  Difficulty swallowing  Throat tightness    If these symptoms occur, NOTIFY PROVIDER and the following treatment should be administered:    1. Epinephrine/Auvi Q 1:1000 IM - 0.3 ml if > 66 lbs or more, 0.15 ml if 33 - 63 lbs, or 0.1 ml if <33 lbs     2. Diphenhydramine - give all intramuscular:     2 to <6 years (off-label use): 6.25 mg,    6 to <12 years: 12.5 to 25 mg;    ?12 years: 25-50 mg.    3.  Famotidine:  Adults 40 mg oral    Adolescents age 12 years and >88 lbs: 40 mg    Children and Adolescents ? 12years of age: Initial: 0.25 mg/kg/dose  every 12 hours (maximum daily dose: 40 mg/day)    Epi/Auvi Q dose may me repeated in 5-15 minutes if adequate resolution of symptoms does not occur    Patient should be observed for at least one hour after final Epi/Auvi Q dose and must be seen by provider. Patients cannot drive themselves if they have received diphenhydramine.

## 2021-10-11 ENCOUNTER — NURSE ONLY (OUTPATIENT)
Dept: ALLERGY | Age: 61
End: 2021-10-11
Payer: COMMERCIAL

## 2021-10-11 VITALS
SYSTOLIC BLOOD PRESSURE: 126 MMHG | DIASTOLIC BLOOD PRESSURE: 86 MMHG | TEMPERATURE: 97 F | HEART RATE: 86 BPM | OXYGEN SATURATION: 97 % | RESPIRATION RATE: 18 BRPM

## 2021-10-11 DIAGNOSIS — Z91.09 ENCOUNTER FOR DESENSITIZATION TO POLLEN ALLERGEN: ICD-10-CM

## 2021-10-11 DIAGNOSIS — Z91.048 ALLERGY TO MOLD: Primary | ICD-10-CM

## 2021-10-11 DIAGNOSIS — Z51.6 ENCOUNTER FOR DESENSITIZATION TO POLLEN ALLERGEN: ICD-10-CM

## 2021-10-11 DIAGNOSIS — J30.1 ALLERGY TO TREES: ICD-10-CM

## 2021-10-11 PROCEDURE — 95117 IMMUNOTHERAPY INJECTIONS: CPT | Performed by: NURSE PRACTITIONER

## 2021-10-11 NOTE — PROGRESS NOTES
After consent obtained/verified, allergy injection given in back of R/L arm(s). VIAL COLOR OF ALL VIALS TODAY IS GREEN    ALLERGY INJECTION FROM VIAL A GIVEN RIGHT  UPPER ARM IN THE AMOUNT OF 0.35 ML    ALLERGY INJECTION FROM VIAL B GIVEN LEFT UPPER ARM IN THE AMOUNT OF 0.35 ML        Documentation of vial injection specific to arm(s) noted on Allergy Immunotherapy Administration Form. Patient declined to wait for 30 minutes           SHOT REACTION TREATMENT INSTRUCTIONS    During the 30 minute wait after an allergy injection the following symptoms should be reported:    Itching other than at the injection site  Hives or swelling other than at the injection site  Redness other than at the injection site  Difficulty breathing  Chest tightness  Difficulty swallowing  Throat tightness    If these symptoms occur, NOTIFY PROVIDER and the following treatment should be administered:    1. Epinephrine/Auvi Q 1:1000 IM - 0.3 ml if > 66 lbs or more, 0.15 ml if 33 - 63 lbs, or 0.1 ml if <33 lbs     2. Diphenhydramine - give all intramuscular:     2 to <6 years (off-label use): 6.25 mg,    6 to <12 years: 12.5 to 25 mg;    ?12 years: 25-50 mg.    3.  Famotidine:  Adults 40 mg oral    Adolescents age 12 years and >88 lbs: 40 mg    Children and Adolescents ? 12years of age: Initial: 0.25 mg/kg/dose  every 12 hours (maximum daily dose: 40 mg/day)    Epi/Auvi Q dose may me repeated in 5-15 minutes if adequate resolution of symptoms does not occur    Patient should be observed for at least one hour after final Epi/Auvi Q dose and must be seen by provider. Patients cannot drive themselves if they have received diphenhydramine.

## 2021-10-18 ENCOUNTER — TELEPHONE (OUTPATIENT)
Dept: ALLERGY | Age: 61
End: 2021-10-18

## 2021-10-18 ENCOUNTER — NURSE ONLY (OUTPATIENT)
Dept: ALLERGY | Age: 61
End: 2021-10-18
Payer: COMMERCIAL

## 2021-10-18 VITALS — SYSTOLIC BLOOD PRESSURE: 124 MMHG | DIASTOLIC BLOOD PRESSURE: 86 MMHG | TEMPERATURE: 97.1 F

## 2021-10-18 DIAGNOSIS — Z91.048 ALLERGY TO MOLD: ICD-10-CM

## 2021-10-18 DIAGNOSIS — J30.1 ALLERGY TO TREES: ICD-10-CM

## 2021-10-18 DIAGNOSIS — Z91.09 ENCOUNTER FOR DESENSITIZATION TO POLLEN ALLERGEN: Primary | ICD-10-CM

## 2021-10-18 DIAGNOSIS — Z51.6 ENCOUNTER FOR DESENSITIZATION TO POLLEN ALLERGEN: Primary | ICD-10-CM

## 2021-10-18 PROCEDURE — 95117 IMMUNOTHERAPY INJECTIONS: CPT | Performed by: NURSE PRACTITIONER

## 2021-10-18 RX ORDER — CELECOXIB 100 MG/1
100 CAPSULE ORAL 2 TIMES DAILY
COMMUNITY

## 2021-10-18 NOTE — PROGRESS NOTES
After consent obtained/verified, allergy injection given in back of R/L arm(s). VIAL COLOR OF ALL VIALS TODAY IS green    ALLERGY INJECTION FROM VIAL A GIVEN left  UPPER ARM IN THE AMOUNT OF 0.40 ML    ALLERGY INJECTION FROM VIAL B GIVEN right UPPER ARM IN THE AMOUNT OF 0.40 ML      Documentation of vial injection specific to arm(s) noted on Allergy Immunotherapy Administration Form. Patient declined to wait for 30 minutes      SHOT REACTION TREATMENT INSTRUCTIONS    During the 30 minute wait after an allergy injection the following symptoms should be reported:    Itching other than at the injection site  Hives or swelling other than at the injection site  Redness other than at the injection site  Difficulty breathing  Chest tightness  Difficulty swallowing  Throat tightness    If these symptoms occur, NOTIFY PROVIDER and the following treatment should be administered:    1. Epinephrine/Auvi Q 1:1000 IM - 0.3 ml if > 66 lbs or more, 0.15 ml if 33 - 63 lbs, or 0.1 ml if <33 lbs     2. Diphenhydramine - give all intramuscular:     2 to <6 years (off-label use): 6.25 mg,    6 to <12 years: 12.5 to 25 mg;    ?12 years: 25-50 mg.    3.  Famotidine:  Adults 40 mg oral    Adolescents age 12 years and >88 lbs: 40 mg    Children and Adolescents ? 12years of age: Initial: 0.25 mg/kg/dose  every 12 hours (maximum daily dose: 40 mg/day)    Epi/Auvi Q dose may me repeated in 5-15 minutes if adequate resolution of symptoms does not occur    Patient should be observed for at least one hour after final Epi/Auvi Q dose and must be seen by provider. Patients cannot drive themselves if they have received diphenhydramine.

## 2021-10-18 NOTE — TELEPHONE ENCOUNTER
If she has a vaginal yeast infection she can try OTC medications. If she wants something orally, she has to go to her PCP.

## 2021-10-19 ENCOUNTER — HOSPITAL ENCOUNTER (OUTPATIENT)
Age: 61
End: 2021-10-19
Payer: COMMERCIAL

## 2021-10-21 ENCOUNTER — HOSPITAL ENCOUNTER (OUTPATIENT)
Age: 61
Discharge: HOME | End: 2021-10-21
Payer: COMMERCIAL

## 2021-10-21 PROCEDURE — 86140 C-REACTIVE PROTEIN: CPT

## 2021-10-21 PROCEDURE — 84550 ASSAY OF BLOOD/URIC ACID: CPT

## 2021-10-21 PROCEDURE — 85651 RBC SED RATE NONAUTOMATED: CPT

## 2021-10-21 PROCEDURE — 86038 ANTINUCLEAR ANTIBODIES: CPT

## 2021-10-21 PROCEDURE — 36415 COLL VENOUS BLD VENIPUNCTURE: CPT

## 2021-10-21 PROCEDURE — 86430 RHEUMATOID FACTOR TEST QUAL: CPT

## 2021-10-21 PROCEDURE — 86200 CCP ANTIBODY: CPT

## 2021-10-22 LAB
C-REACTIVE PROTEIN: < 0.3 MG/DL (ref 0–1)
RHEUMATOID FACTOR: 11 IU/ML (ref 0–13)
SEDIMENTATION RATE, ERYTHROCYTE: 1 MM/HR (ref 0–20)
URIC ACID: 5 MG/DL (ref 2.4–5.7)

## 2021-10-24 LAB — ANA SCREEN: NORMAL

## 2021-10-25 ENCOUNTER — NURSE ONLY (OUTPATIENT)
Dept: ALLERGY | Age: 61
End: 2021-10-25
Payer: COMMERCIAL

## 2021-10-25 VITALS
SYSTOLIC BLOOD PRESSURE: 124 MMHG | TEMPERATURE: 97 F | HEART RATE: 81 BPM | DIASTOLIC BLOOD PRESSURE: 80 MMHG | RESPIRATION RATE: 16 BRPM | OXYGEN SATURATION: 97 %

## 2021-10-25 DIAGNOSIS — J30.1 ALLERGY TO TREES: Primary | ICD-10-CM

## 2021-10-25 DIAGNOSIS — Z91.09 ENCOUNTER FOR DESENSITIZATION TO POLLEN ALLERGEN: ICD-10-CM

## 2021-10-25 DIAGNOSIS — Z91.048 ALLERGY TO MOLD: ICD-10-CM

## 2021-10-25 DIAGNOSIS — Z51.6 ENCOUNTER FOR DESENSITIZATION TO POLLEN ALLERGEN: ICD-10-CM

## 2021-10-25 LAB — CYCLIC CITRULLINATED PEPTIDE ANTIBODY IGG: 2.1 U/ML (ref 0–7)

## 2021-10-25 PROCEDURE — 95117 IMMUNOTHERAPY INJECTIONS: CPT | Performed by: NURSE PRACTITIONER

## 2021-10-25 NOTE — PROGRESS NOTES
After consent obtained/verified, allergy injection given in back of R/L arm(s). VIAL COLOR OF ALL VIALS TODAY IS Green    ALLERGY INJECTION FROM VIAL A GIVEN Right  UPPER ARM IN THE AMOUNT OF 0.40 ML    ALLERGY INJECTION FROM VIAL B GIVEN Left UPPER ARM IN THE AMOUNT OF 0.40 ML      Documentation of vial injection specific to arm(s) noted on Allergy Immunotherapy Administration Form. Patient declined to wait for 30 minutes              SHOT REACTION TREATMENT INSTRUCTIONS    During the 30 minute wait after an allergy injection the following symptoms should be reported:    Itching other than at the injection site  Hives or swelling other than at the injection site  Redness other than at the injection site  Difficulty breathing  Chest tightness  Difficulty swallowing  Throat tightness    If these symptoms occur, NOTIFY PROVIDER and the following treatment should be administered:    1. Epinephrine/Auvi Q 1:1000 IM - 0.3 ml if > 66 lbs or more, 0.15 ml if 33 - 63 lbs, or 0.1 ml if <33 lbs     2. Diphenhydramine - give all intramuscular:     2 to <6 years (off-label use): 6.25 mg,    6 to <12 years: 12.5 to 25 mg;    ?12 years: 25-50 mg.    3.  Famotidine:  Adults 40 mg oral    Adolescents age 12 years and >88 lbs: 40 mg    Children and Adolescents ? 12years of age: Initial: 0.25 mg/kg/dose  every 12 hours (maximum daily dose: 40 mg/day)    Epi/Auvi Q dose may me repeated in 5-15 minutes if adequate resolution of symptoms does not occur    Patient should be observed for at least one hour after final Epi/Auvi Q dose and must be seen by provider. Patients cannot drive themselves if they have received diphenhydramine.

## 2021-11-08 ENCOUNTER — NURSE ONLY (OUTPATIENT)
Dept: ALLERGY | Age: 61
End: 2021-11-08
Payer: COMMERCIAL

## 2021-11-08 VITALS — DIASTOLIC BLOOD PRESSURE: 78 MMHG | SYSTOLIC BLOOD PRESSURE: 116 MMHG | HEART RATE: 78 BPM | TEMPERATURE: 97.1 F

## 2021-11-08 DIAGNOSIS — Z51.6 ENCOUNTER FOR DESENSITIZATION TO POLLEN ALLERGEN: Primary | ICD-10-CM

## 2021-11-08 DIAGNOSIS — Z91.048 ALLERGY TO MOLD: ICD-10-CM

## 2021-11-08 DIAGNOSIS — Z91.09 ENCOUNTER FOR DESENSITIZATION TO POLLEN ALLERGEN: Primary | ICD-10-CM

## 2021-11-08 DIAGNOSIS — J30.1 ALLERGY TO TREES: ICD-10-CM

## 2021-11-08 PROCEDURE — 95117 IMMUNOTHERAPY INJECTIONS: CPT | Performed by: NURSE PRACTITIONER

## 2021-11-15 ENCOUNTER — NURSE ONLY (OUTPATIENT)
Dept: ALLERGY | Age: 61
End: 2021-11-15
Payer: COMMERCIAL

## 2021-11-15 VITALS
HEART RATE: 96 BPM | SYSTOLIC BLOOD PRESSURE: 118 MMHG | RESPIRATION RATE: 14 BRPM | DIASTOLIC BLOOD PRESSURE: 70 MMHG | TEMPERATURE: 96.7 F

## 2021-11-15 DIAGNOSIS — Z51.6 ENCOUNTER FOR DESENSITIZATION TO POLLEN ALLERGEN: Primary | ICD-10-CM

## 2021-11-15 DIAGNOSIS — Z91.09 ENCOUNTER FOR DESENSITIZATION TO POLLEN ALLERGEN: Primary | ICD-10-CM

## 2021-11-15 DIAGNOSIS — Z91.048 ALLERGY TO MOLD: ICD-10-CM

## 2021-11-15 DIAGNOSIS — J30.1 ALLERGY TO TREES: ICD-10-CM

## 2021-11-15 PROCEDURE — 95117 IMMUNOTHERAPY INJECTIONS: CPT | Performed by: NURSE PRACTITIONER

## 2021-11-15 NOTE — PROGRESS NOTES
After consent obtained/verified, allergy injection given in back of R/L arm(s). VIAL COLOR OF ALL VIALS TODAY IS GREEN    ALLERGY INJECTION FROM VIAL A GIVEN RIGHT UPPER ARM IN THE AMOUNT OF 0.45 ML    ALLERGY INJECTION FROM VIAL B GIVEN LEFT UPPER ARM IN THE AMOUNT OF 0.45 ML        Documentation of vial injection specific to arm(s) noted on Allergy Immunotherapy Administration Form. Patient declined to wait for 30 minutes. SHOT REACTION TREATMENT INSTRUCTIONS    During the 30 minute wait after an allergy injection the following symptoms should be reported:    Itching other than at the injection site  Hives or swelling other than at the injection site  Redness other than at the injection site  Difficulty breathing  Chest tightness  Difficulty swallowing  Throat tightness    If these symptoms occur, NOTIFY PROVIDER and the following treatment should be administered:    1. Epinephrine/Auvi Q 1:1000 IM - 0.3 ml if > 66 lbs or more, 0.15 ml if 33 - 63 lbs, or 0.1 ml if <33 lbs     2. Diphenhydramine - give all intramuscular:     2 to <6 years (off-label use): 6.25 mg,    6 to <12 years: 12.5 to 25 mg;    ?12 years: 25-50 mg.    3.  Famotidine:  Adults 40 mg oral    Adolescents age 12 years and >88 lbs: 40 mg    Children and Adolescents ? 12years of age: Initial: 0.25 mg/kg/dose  every 12 hours (maximum daily dose: 40 mg/day)    Epi/Auvi Q dose may me repeated in 5-15 minutes if adequate resolution of symptoms does not occur    Patient should be observed for at least one hour after final Epi/Auvi Q dose and must be seen by provider. Patients cannot drive themselves if they have received diphenhydramine.

## 2021-11-18 ENCOUNTER — NURSE ONLY (OUTPATIENT)
Dept: ALLERGY | Age: 61
End: 2021-11-18
Payer: COMMERCIAL

## 2021-11-18 VITALS — SYSTOLIC BLOOD PRESSURE: 120 MMHG | TEMPERATURE: 96.8 F | HEART RATE: 80 BPM | DIASTOLIC BLOOD PRESSURE: 80 MMHG

## 2021-11-18 DIAGNOSIS — Z91.09 ENCOUNTER FOR DESENSITIZATION TO POLLEN ALLERGEN: Primary | ICD-10-CM

## 2021-11-18 DIAGNOSIS — J30.1 ALLERGY TO TREES: ICD-10-CM

## 2021-11-18 DIAGNOSIS — Z51.6 ENCOUNTER FOR DESENSITIZATION TO POLLEN ALLERGEN: Primary | ICD-10-CM

## 2021-11-18 DIAGNOSIS — Z91.048 ALLERGY TO MOLD: ICD-10-CM

## 2021-11-18 PROCEDURE — 95117 IMMUNOTHERAPY INJECTIONS: CPT | Performed by: NURSE PRACTITIONER

## 2021-11-22 ENCOUNTER — NURSE ONLY (OUTPATIENT)
Dept: ALLERGY | Age: 61
End: 2021-11-22
Payer: COMMERCIAL

## 2021-11-22 VITALS — SYSTOLIC BLOOD PRESSURE: 102 MMHG | TEMPERATURE: 97.6 F | DIASTOLIC BLOOD PRESSURE: 64 MMHG

## 2021-11-22 DIAGNOSIS — Z51.6 ENCOUNTER FOR DESENSITIZATION TO POLLEN ALLERGEN: Primary | ICD-10-CM

## 2021-11-22 DIAGNOSIS — Z91.048 ALLERGY TO MOLD: ICD-10-CM

## 2021-11-22 DIAGNOSIS — J30.1 ALLERGY TO TREES: ICD-10-CM

## 2021-11-22 DIAGNOSIS — Z91.09 ENCOUNTER FOR DESENSITIZATION TO POLLEN ALLERGEN: Primary | ICD-10-CM

## 2021-11-22 PROCEDURE — 95117 IMMUNOTHERAPY INJECTIONS: CPT | Performed by: NURSE PRACTITIONER

## 2021-11-22 NOTE — PROGRESS NOTES
After consent obtained/verified, allergy injection given in back of R/L arm(s). VIAL COLOR OF ALL VIALS TODAY IS BLUE    ALLERGY INJECTION FROM VIAL A GIVEN RIGHT  UPPER ARM IN THE AMOUNT OF 0.05 ML    ALLERGY INJECTION FROM VIAL B GIVEN LEFT UPPER ARM IN THE AMOUNT OF 0.05 ML      Documentation of vial injection specific to arm(s) noted on Allergy Immunotherapy Administration Form. Patient declined to wait for 30 minutes           SHOT REACTION TREATMENT INSTRUCTIONS    During the 30 minute wait after an allergy injection the following symptoms should be reported:    Itching other than at the injection site  Hives or swelling other than at the injection site  Redness other than at the injection site  Difficulty breathing  Chest tightness  Difficulty swallowing  Throat tightness    If these symptoms occur, NOTIFY PROVIDER and the following treatment should be administered:    1. Epinephrine/Auvi Q 1:1000 IM - 0.3 ml if > 66 lbs or more, 0.15 ml if 33 - 63 lbs, or 0.1 ml if <33 lbs     2. Diphenhydramine - give all intramuscular:     2 to <6 years (off-label use): 6.25 mg,    6 to <12 years: 12.5 to 25 mg;    ?12 years: 25-50 mg.    3.  Famotidine:  Adults 40 mg oral    Adolescents age 12 years and >88 lbs: 40 mg    Children and Adolescents ? 12years of age: Initial: 0.25 mg/kg/dose  every 12 hours (maximum daily dose: 40 mg/day)    Epi/Auvi Q dose may me repeated in 5-15 minutes if adequate resolution of symptoms does not occur    Patient should be observed for at least one hour after final Epi/Auvi Q dose and must be seen by provider. Patients cannot drive themselves if they have received diphenhydramine.

## 2021-12-16 ENCOUNTER — NURSE ONLY (OUTPATIENT)
Dept: ALLERGY | Age: 61
End: 2021-12-16
Payer: COMMERCIAL

## 2021-12-16 VITALS
DIASTOLIC BLOOD PRESSURE: 70 MMHG | OXYGEN SATURATION: 97 % | TEMPERATURE: 96.7 F | HEART RATE: 86 BPM | SYSTOLIC BLOOD PRESSURE: 110 MMHG

## 2021-12-16 DIAGNOSIS — Z91.048 ALLERGY TO MOLD: ICD-10-CM

## 2021-12-16 DIAGNOSIS — Z51.6 ENCOUNTER FOR DESENSITIZATION TO POLLEN ALLERGEN: Primary | ICD-10-CM

## 2021-12-16 DIAGNOSIS — J30.1 ALLERGY TO TREES: ICD-10-CM

## 2021-12-16 DIAGNOSIS — Z91.09 ENCOUNTER FOR DESENSITIZATION TO POLLEN ALLERGEN: Primary | ICD-10-CM

## 2021-12-16 PROCEDURE — 95117 IMMUNOTHERAPY INJECTIONS: CPT | Performed by: NURSE PRACTITIONER

## 2021-12-16 NOTE — PROGRESS NOTES
After consent obtained/verified, allergy injection given in back of R/L arm(s). VIAL COLOR OF ALL VIALS TODAY IS BLUE    ALLERGY INJECTION FROM VIAL A GIVEN LEFT UPPER ARM IN THE AMOUNT OF 0.05 ML    ALLERGY INJECTION FROM VIAL B GIVEN RIGHT UPPER ARM IN THE AMOUNT OF 0.05 ML          Documentation of vial injection specific to arm(s) noted on Allergy Immunotherapy Administration Form. Patient declined to wait for 30 minutes          . SHOT REACTION TREATMENT INSTRUCTIONS    During the 30 minute wait after an allergy injection the following symptoms should be reported:    Itching other than at the injection site  Hives or swelling other than at the injection site  Redness other than at the injection site  Difficulty breathing  Chest tightness  Difficulty swallowing  Throat tightness    If these symptoms occur, NOTIFY PROVIDER and the following treatment should be administered:    1. Epinephrine/Auvi Q 1:1000 IM - 0.3 ml if > 66 lbs or more, 0.15 ml if 33 - 63 lbs, or 0.1 ml if <33 lbs     2. Diphenhydramine - give all intramuscular:     2 to <6 years (off-label use): 6.25 mg,    6 to <12 years: 12.5 to 25 mg;    ?12 years: 25-50 mg.    3.  Famotidine:  Adults 40 mg oral    Adolescents age 12 years and >88 lbs: 40 mg    Children and Adolescents ? 12years of age: Initial: 0.25 mg/kg/dose  every 12 hours (maximum daily dose: 40 mg/day)    Epi/Auvi Q dose may me repeated in 5-15 minutes if adequate resolution of symptoms does not occur    Patient should be observed for at least one hour after final Epi/Auvi Q dose and must be seen by provider. Patients cannot drive themselves if they have received diphenhydramine.

## 2021-12-20 ENCOUNTER — NURSE ONLY (OUTPATIENT)
Dept: ALLERGY | Age: 61
End: 2021-12-20
Payer: COMMERCIAL

## 2021-12-20 VITALS — SYSTOLIC BLOOD PRESSURE: 110 MMHG | TEMPERATURE: 96.8 F | DIASTOLIC BLOOD PRESSURE: 78 MMHG

## 2021-12-20 DIAGNOSIS — Z91.048 ALLERGY TO MOLD: ICD-10-CM

## 2021-12-20 DIAGNOSIS — J30.1 ALLERGY TO TREES: ICD-10-CM

## 2021-12-20 DIAGNOSIS — Z51.6 ENCOUNTER FOR DESENSITIZATION TO POLLEN ALLERGEN: Primary | ICD-10-CM

## 2021-12-20 DIAGNOSIS — Z91.09 ENCOUNTER FOR DESENSITIZATION TO POLLEN ALLERGEN: Primary | ICD-10-CM

## 2021-12-20 PROCEDURE — 95117 IMMUNOTHERAPY INJECTIONS: CPT | Performed by: NURSE PRACTITIONER

## 2021-12-20 NOTE — PROGRESS NOTES
After consent obtained/verified, allergy injection given in back of R/L arm(s). VIAL COLOR OF ALL VIALS TODAY IS BLUE    ALLERGY INJECTION FROM VIAL A GIVEN RIGHT  UPPER ARM IN THE AMOUNT OF 0.10 ML    ALLERGY INJECTION FROM VIAL B GIVEN LEFT UPPER ARM IN THE AMOUNT OF 0.10 ML          Documentation of vial injection specific to arm(s) noted on Allergy Immunotherapy Administration Form. Patient declined to wait for 30 minutes   SHOT REACTION TREATMENT INSTRUCTIONS    During the 30 minute wait after an allergy injection the following symptoms should be reported:    Itching other than at the injection site  Hives or swelling other than at the injection site  Redness other than at the injection site  Difficulty breathing  Chest tightness  Difficulty swallowing  Throat tightness    If these symptoms occur, NOTIFY PROVIDER and the following treatment should be administered:    1. Epinephrine/Auvi Q 1:1000 IM - 0.3 ml if > 66 lbs or more, 0.15 ml if 33 - 63 lbs, or 0.1 ml if <33 lbs     2. Diphenhydramine - give all intramuscular:     2 to <6 years (off-label use): 6.25 mg,    6 to <12 years: 12.5 to 25 mg;    ?12 years: 25-50 mg.    3.  Famotidine:  Adults 40 mg oral    Adolescents age 12 years and >88 lbs: 40 mg    Children and Adolescents ? 12years of age: Initial: 0.25 mg/kg/dose  every 12 hours (maximum daily dose: 40 mg/day)    Epi/Auvi Q dose may me repeated in 5-15 minutes if adequate resolution of symptoms does not occur    Patient should be observed for at least one hour after final Epi/Auvi Q dose and must be seen by provider. Patients cannot drive themselves if they have received diphenhydramine.

## 2021-12-23 ENCOUNTER — NURSE ONLY (OUTPATIENT)
Dept: ALLERGY | Age: 61
End: 2021-12-23
Payer: COMMERCIAL

## 2021-12-23 VITALS
RESPIRATION RATE: 14 BRPM | SYSTOLIC BLOOD PRESSURE: 124 MMHG | TEMPERATURE: 96.7 F | DIASTOLIC BLOOD PRESSURE: 70 MMHG | HEART RATE: 72 BPM

## 2021-12-23 DIAGNOSIS — Z91.048 ALLERGY TO MOLD: ICD-10-CM

## 2021-12-23 DIAGNOSIS — J30.1 ALLERGY TO TREES: ICD-10-CM

## 2021-12-23 DIAGNOSIS — Z91.09 ENCOUNTER FOR DESENSITIZATION TO POLLEN ALLERGEN: Primary | ICD-10-CM

## 2021-12-23 DIAGNOSIS — Z51.6 ENCOUNTER FOR DESENSITIZATION TO POLLEN ALLERGEN: Primary | ICD-10-CM

## 2021-12-23 PROCEDURE — 95117 IMMUNOTHERAPY INJECTIONS: CPT | Performed by: NURSE PRACTITIONER

## 2021-12-23 NOTE — PROGRESS NOTES
After consent obtained/verified, allergy injection given in back of R/L arm(s). VIAL COLOR OF ALL VIALS TODAY IS BLUE    ALLERGY INJECTION FROM VIAL A GIVEN LEFT  UPPER ARM IN THE AMOUNT OF 0.15 ML    ALLERGY INJECTION FROM VIAL B GIVEN RIGHT UPPER ARM IN THE AMOUNT OF 0.15 ML      Documentation of vial injection specific to arm(s) noted on Allergy Immunotherapy Administration Form. Patient declined to wait for 30 minutes               SHOT REACTION TREATMENT INSTRUCTIONS    During the 30 minute wait after an allergy injection the following symptoms should be reported:    Itching other than at the injection site  Hives or swelling other than at the injection site  Redness other than at the injection site  Difficulty breathing  Chest tightness  Difficulty swallowing  Throat tightness    If these symptoms occur, NOTIFY PROVIDER and the following treatment should be administered:    1. Epinephrine/Auvi Q 1:1000 IM - 0.3 ml if > 66 lbs or more, 0.15 ml if 33 - 63 lbs, or 0.1 ml if <33 lbs     2. Diphenhydramine - give all intramuscular:     2 to <6 years (off-label use): 6.25 mg,    6 to <12 years: 12.5 to 25 mg;    ?12 years: 25-50 mg.    3.  Famotidine:  Adults 40 mg oral    Adolescents age 12 years and >88 lbs: 40 mg    Children and Adolescents ? 12years of age: Initial: 0.25 mg/kg/dose  every 12 hours (maximum daily dose: 40 mg/day)    Epi/Auvi Q dose may me repeated in 5-15 minutes if adequate resolution of symptoms does not occur    Patient should be observed for at least one hour after final Epi/Auvi Q dose and must be seen by provider. Patients cannot drive themselves if they have received diphenhydramine.

## 2022-01-03 ENCOUNTER — NURSE ONLY (OUTPATIENT)
Dept: ALLERGY | Age: 62
End: 2022-01-03
Payer: COMMERCIAL

## 2022-01-03 VITALS — TEMPERATURE: 97.1 F | SYSTOLIC BLOOD PRESSURE: 125 MMHG | DIASTOLIC BLOOD PRESSURE: 75 MMHG

## 2022-01-03 DIAGNOSIS — J30.1 NON-SEASONAL ALLERGIC RHINITIS DUE TO POLLEN: ICD-10-CM

## 2022-01-03 DIAGNOSIS — Z51.6 ENCOUNTER FOR DESENSITIZATION TO POLLEN ALLERGEN: Primary | ICD-10-CM

## 2022-01-03 DIAGNOSIS — J30.1 ALLERGY TO TREES: ICD-10-CM

## 2022-01-03 DIAGNOSIS — Z91.09 ENCOUNTER FOR DESENSITIZATION TO POLLEN ALLERGEN: Primary | ICD-10-CM

## 2022-01-03 DIAGNOSIS — Z91.048 ALLERGY TO MOLD: ICD-10-CM

## 2022-01-03 PROCEDURE — 95117 IMMUNOTHERAPY INJECTIONS: CPT | Performed by: NURSE PRACTITIONER

## 2022-01-03 RX ORDER — MONTELUKAST SODIUM 10 MG/1
10 TABLET ORAL NIGHTLY
Qty: 90 TABLET | Refills: 0 | Status: SHIPPED | OUTPATIENT
Start: 2022-01-03 | End: 2022-01-19 | Stop reason: SDUPTHER

## 2022-01-03 NOTE — PROGRESS NOTES
After consent obtained/verified, allergy injection given in back of R/L arm(s). VIAL COLOR OF ALL VIALS TODAY IS BLUE    ALLERGY INJECTION FROM VIAL A GIVEN right  UPPER ARM IN THE AMOUNT OF 0.15 ML    ALLERGY INJECTION FROM VIAL B GIVEN left upper ARM IN THE AMOUNT OF 0.15 ML          Documentation of vial injection specific to arm(s) noted on Allergy Immunotherapy Administration Form. Patient waited 30 minutes for observation. No      Patient tolerated well without adverse reaction WHILE IN OFFICE    SHOT REACTION TREATMENT INSTRUCTIONS    During the 30 minute wait after an allergy injection the following symptoms should be reported:    Itching other than at the injection site  Hives or swelling other than at the injection site  Redness other than at the injection site  Difficulty breathing  Chest tightness  Difficulty swallowing  Throat tightness    If these symptoms occur, NOTIFY PROVIDER and the following treatment should be administered:    1. Epinephrine/Auvi Q 1:1000 IM - 0.3 ml if > 66 lbs or more, 0.15 ml if 33 - 63 lbs, or 0.1 ml if <33 lbs     2. Diphenhydramine - give all intramuscular:     2 to <6 years (off-label use): 6.25 mg,    6 to <12 years: 12.5 to 25 mg;    ?12 years: 25-50 mg.    3.  Famotidine:  Adults 40 mg oral    Adolescents age 12 years and >88 lbs: 40 mg    Children and Adolescents ? 12years of age: Initial: 0.25 mg/kg/dose  every 12 hours (maximum daily dose: 40 mg/day)    Epi/Auvi Q dose may me repeated in 5-15 minutes if adequate resolution of symptoms does not occur    Patient should be observed for at least one hour after final Epi/Auvi Q dose and must be seen by provider. Patients cannot drive themselves if they have received diphenhydramine.

## 2022-01-06 ENCOUNTER — NURSE ONLY (OUTPATIENT)
Dept: ALLERGY | Age: 62
End: 2022-01-06
Payer: COMMERCIAL

## 2022-01-06 VITALS
HEART RATE: 108 BPM | DIASTOLIC BLOOD PRESSURE: 70 MMHG | TEMPERATURE: 97.4 F | RESPIRATION RATE: 24 BRPM | SYSTOLIC BLOOD PRESSURE: 100 MMHG

## 2022-01-06 DIAGNOSIS — J30.1 NON-SEASONAL ALLERGIC RHINITIS DUE TO POLLEN: ICD-10-CM

## 2022-01-06 DIAGNOSIS — Z91.09 ENCOUNTER FOR DESENSITIZATION TO POLLEN ALLERGEN: Primary | ICD-10-CM

## 2022-01-06 DIAGNOSIS — Z91.048 ALLERGY TO MOLD: ICD-10-CM

## 2022-01-06 DIAGNOSIS — Z51.6 ENCOUNTER FOR DESENSITIZATION TO POLLEN ALLERGEN: Primary | ICD-10-CM

## 2022-01-06 DIAGNOSIS — J30.1 ALLERGY TO TREES: ICD-10-CM

## 2022-01-06 DIAGNOSIS — J30.89 SEASONAL ALLERGIC RHINITIS DUE TO FUNGAL SPORES: ICD-10-CM

## 2022-01-06 PROCEDURE — 95117 IMMUNOTHERAPY INJECTIONS: CPT | Performed by: NURSE PRACTITIONER

## 2022-01-06 NOTE — PROGRESS NOTES
After consent obtained/verified, allergy injection given in back of R/L arm(s). VIAL COLOR OF ALL VIALS TODAY IS BLUE    ALLERGY INJECTION FROM VIAL A GIVEN left  UPPER ARM IN THE AMOUNT OF 0.20 ML    ALLERGY INJECTION FROM VIAL B GIVEN right upper ARM IN THE AMOUNT OF 0.20 ML      Documentation of vial injection specific to arm(s) noted on Allergy Immunotherapy Administration Form. Patient waited 30 minutes for observation. No      Patient tolerated well without adverse reaction WHILE IN OFFICE    SHOT REACTION TREATMENT INSTRUCTIONS    During the 30 minute wait after an allergy injection the following symptoms should be reported:    Itching other than at the injection site  Hives or swelling other than at the injection site  Redness other than at the injection site  Difficulty breathing  Chest tightness  Difficulty swallowing  Throat tightness    If these symptoms occur, NOTIFY PROVIDER and the following treatment should be administered:    1. Epinephrine/Auvi Q 1:1000 IM - 0.3 ml if > 66 lbs or more, 0.15 ml if 33 - 63 lbs, or 0.1 ml if <33 lbs     2. Diphenhydramine - give all intramuscular:     2 to <6 years (off-label use): 6.25 mg,    6 to <12 years: 12.5 to 25 mg;    ?12 years: 25-50 mg.    3.  Famotidine:  Adults 40 mg oral    Adolescents age 12 years and >88 lbs: 40 mg    Children and Adolescents ? 12years of age: Initial: 0.25 mg/kg/dose  every 12 hours (maximum daily dose: 40 mg/day)    Epi/Auvi Q dose may me repeated in 5-15 minutes if adequate resolution of symptoms does not occur    Patient should be observed for at least one hour after final Epi/Auvi Q dose and must be seen by provider. Patients cannot drive themselves if they have received diphenhydramine.

## 2022-01-17 ENCOUNTER — NURSE ONLY (OUTPATIENT)
Dept: ALLERGY | Age: 62
End: 2022-01-17
Payer: COMMERCIAL

## 2022-01-17 VITALS
RESPIRATION RATE: 16 BRPM | OXYGEN SATURATION: 98 % | TEMPERATURE: 97.2 F | DIASTOLIC BLOOD PRESSURE: 78 MMHG | SYSTOLIC BLOOD PRESSURE: 118 MMHG | HEART RATE: 68 BPM

## 2022-01-17 DIAGNOSIS — Z51.6 ENCOUNTER FOR DESENSITIZATION TO POLLEN ALLERGEN: ICD-10-CM

## 2022-01-17 DIAGNOSIS — J30.1 ALLERGY TO TREES: Primary | ICD-10-CM

## 2022-01-17 DIAGNOSIS — Z91.048 ALLERGY TO MOLD: ICD-10-CM

## 2022-01-17 DIAGNOSIS — Z91.09 ENCOUNTER FOR DESENSITIZATION TO POLLEN ALLERGEN: ICD-10-CM

## 2022-01-17 PROCEDURE — 95117 IMMUNOTHERAPY INJECTIONS: CPT | Performed by: NURSE PRACTITIONER

## 2022-01-17 NOTE — PROGRESS NOTES
After consent obtained/verified, allergy injection given in back of R/L arm(s). VIAL COLOR OF ALL VIALS TODAY IS Blue    ALLERGY INJECTION FROM VIAL A GIVEN right  UPPER ARM IN THE AMOUNT OF 0.20 ML    ALLERGY INJECTION FROM VIAL B GIVEN left upper ARM IN THE AMOUNT OF 0.20 ML    Documentation of vial injection specific to arm(s) noted on Allergy Immunotherapy Administration Form. Patient waited 30 minutes for observation. No      Patient tolerated well without adverse reaction WHILE IN OFFICE    SHOT REACTION TREATMENT INSTRUCTIONS    During the 30 minute wait after an allergy injection the following symptoms should be reported:    Itching other than at the injection site  Hives or swelling other than at the injection site  Redness other than at the injection site  Difficulty breathing  Chest tightness  Difficulty swallowing  Throat tightness    If these symptoms occur, NOTIFY PROVIDER and the following treatment should be administered:    1. Epinephrine/Auvi Q 1:1000 IM - 0.3 ml if > 66 lbs or more, 0.15 ml if 33 - 63 lbs, or 0.1 ml if <33 lbs     2. Diphenhydramine - give all intramuscular:     2 to <6 years (off-label use): 6.25 mg,    6 to <12 years: 12.5 to 25 mg;    ?12 years: 25-50 mg.    3.  Famotidine:  Adults 40 mg oral    Adolescents age 12 years and >88 lbs: 40 mg    Children and Adolescents ? 12years of age: Initial: 0.25 mg/kg/dose  every 12 hours (maximum daily dose: 40 mg/day)    Epi/Auvi Q dose may me repeated in 5-15 minutes if adequate resolution of symptoms does not occur    Patient should be observed for at least one hour after final Epi/Auvi Q dose and must be seen by provider. Patients cannot drive themselves if they have received diphenhydramine.

## 2022-01-19 ENCOUNTER — NURSE ONLY (OUTPATIENT)
Dept: ALLERGY | Age: 62
End: 2022-01-19
Payer: COMMERCIAL

## 2022-01-19 ENCOUNTER — OFFICE VISIT (OUTPATIENT)
Dept: ALLERGY | Age: 62
End: 2022-01-19
Payer: COMMERCIAL

## 2022-01-19 VITALS
RESPIRATION RATE: 16 BRPM | WEIGHT: 158.6 LBS | BODY MASS INDEX: 26.39 KG/M2 | HEART RATE: 86 BPM | OXYGEN SATURATION: 98 % | SYSTOLIC BLOOD PRESSURE: 102 MMHG | TEMPERATURE: 98.1 F | DIASTOLIC BLOOD PRESSURE: 68 MMHG

## 2022-01-19 VITALS
SYSTOLIC BLOOD PRESSURE: 102 MMHG | RESPIRATION RATE: 16 BRPM | HEART RATE: 86 BPM | DIASTOLIC BLOOD PRESSURE: 68 MMHG | TEMPERATURE: 98.1 F | OXYGEN SATURATION: 98 %

## 2022-01-19 DIAGNOSIS — J30.1 CHRONIC ALLERGIC RHINITIS DUE TO POLLEN: ICD-10-CM

## 2022-01-19 DIAGNOSIS — J30.1 ALLERGY TO TREES: ICD-10-CM

## 2022-01-19 DIAGNOSIS — Z77.120 EXPOSURE TO MOLD: Primary | ICD-10-CM

## 2022-01-19 DIAGNOSIS — J30.1 ACUTE SEASONAL ALLERGIC RHINITIS DUE TO POLLEN: ICD-10-CM

## 2022-01-19 DIAGNOSIS — Z51.6 ENCOUNTER FOR DESENSITIZATION TO ALLERGENS: ICD-10-CM

## 2022-01-19 DIAGNOSIS — J30.1 NON-SEASONAL ALLERGIC RHINITIS DUE TO POLLEN: ICD-10-CM

## 2022-01-19 DIAGNOSIS — J30.89 SEASONAL ALLERGIC RHINITIS DUE TO FUNGAL SPORES: ICD-10-CM

## 2022-01-19 DIAGNOSIS — Z91.048 ALLERGY TO MOLD: Primary | ICD-10-CM

## 2022-01-19 DIAGNOSIS — Z29.8 PROPHYLACTIC IMMUNOTHERAPY: ICD-10-CM

## 2022-01-19 DIAGNOSIS — Z91.09 MITE ALLERGY: ICD-10-CM

## 2022-01-19 DIAGNOSIS — Z91.048 ALLERGY TO MOLD: ICD-10-CM

## 2022-01-19 PROCEDURE — 99213 OFFICE O/P EST LOW 20 MIN: CPT | Performed by: NURSE PRACTITIONER

## 2022-01-19 PROCEDURE — G8419 CALC BMI OUT NRM PARAM NOF/U: HCPCS | Performed by: NURSE PRACTITIONER

## 2022-01-19 PROCEDURE — 1036F TOBACCO NON-USER: CPT | Performed by: NURSE PRACTITIONER

## 2022-01-19 PROCEDURE — 3017F COLORECTAL CA SCREEN DOC REV: CPT | Performed by: NURSE PRACTITIONER

## 2022-01-19 PROCEDURE — G8427 DOCREV CUR MEDS BY ELIG CLIN: HCPCS | Performed by: NURSE PRACTITIONER

## 2022-01-19 PROCEDURE — 95117 IMMUNOTHERAPY INJECTIONS: CPT | Performed by: NURSE PRACTITIONER

## 2022-01-19 PROCEDURE — G8484 FLU IMMUNIZE NO ADMIN: HCPCS | Performed by: NURSE PRACTITIONER

## 2022-01-19 RX ORDER — MONTELUKAST SODIUM 10 MG/1
10 TABLET ORAL NIGHTLY
Qty: 90 TABLET | Refills: 3 | Status: SHIPPED | OUTPATIENT
Start: 2022-01-19

## 2022-01-19 ASSESSMENT — ENCOUNTER SYMPTOMS
SORE THROAT: 0
COLOR CHANGE: 0
VOMITING: 0
DIARRHEA: 0
VOICE CHANGE: 0
TROUBLE SWALLOWING: 0
CHEST TIGHTNESS: 0
COUGH: 0
FACIAL SWELLING: 0
STRIDOR: 0
NAUSEA: 0
ABDOMINAL PAIN: 0
RHINORRHEA: 0
CHOKING: 0
APNEA: 0
SINUS PRESSURE: 0
WHEEZING: 0
SHORTNESS OF BREATH: 0

## 2022-01-19 NOTE — PROGRESS NOTES
[email or web address]    Allergy & Asthma   [address] [facility], [address]  Ph:   [phone]  Fax:[phone]    Provider:  Dr. Vishal Wekes:   Chief Complaint   Patient presents with    Follow-up     Patient here for 6 month follow up for her allergy management. HISTORY OF PRESENT ILLNESS: ESTABLISHED PATIENT HERE FOR EVALUATION       Rosa Hastings is here for immunotherapy follow-up. She has been on blur color vial  since 11/22/21. Patient currently receiving allergy injections every  1 WEEK. Immunotherapy treatment has not been changed since starting date. There have been no local reactions to immunotherapy injections. There have been no systemic reactions to IT. On immunotherapy, patient has had a 50-60 % reduction in symptoms. Since beginning immunotherapy no new allergy symptoms have developed. She does not suspect any new allergen sensitization. During the last year patient has not started Beta-blocker medication. She would like to continue immunotherapy. Patient has found immunotherapy to be very beneficial in controlling symptoms and would like to continue. Patient is continuing cetirizine (Zyrtec) and allegra antihistamine with her allergy shots. Cough has improved        Review of Systems:  Review of Systems   Constitutional: Negative for activity change, appetite change, chills, diaphoresis, fatigue, fever and unexpected weight change. HENT: Negative for congestion, dental problem, ear discharge, ear pain, facial swelling, hearing loss, mouth sores, nosebleeds, postnasal drip, rhinorrhea, sinus pressure, sneezing, sore throat, tinnitus, trouble swallowing and voice change. Eyes: Negative for visual disturbance. Respiratory: Negative for apnea, cough, choking, chest tightness, shortness of breath, wheezing and stridor. Cardiovascular: Negative for chest pain, palpitations and leg swelling.    Gastrointestinal: Negative for abdominal pain, diarrhea, nausea and vomiting. Endocrine: Negative for cold intolerance, heat intolerance, polydipsia and polyuria. Genitourinary: Negative for dysuria, enuresis and hematuria. Musculoskeletal: Negative for arthralgias, gait problem, neck pain and neck stiffness. Skin: Negative for color change and rash. Allergic/Immunologic: Negative for environmental allergies, food allergies and immunocompromised state. Neurological: Negative for dizziness, syncope, facial asymmetry, speech difficulty, light-headedness and headaches. Hematological: Negative for adenopathy. Does not bruise/bleed easily. Psychiatric/Behavioral: Negative for confusion and sleep disturbance. The patient is not nervous/anxious.           Past MedicalHistory:    Past Medical History:   Diagnosis Date    Anxiety     Arthritis     Bursitis     DJD (degenerative joint disease)     Nausea & vomiting     Pancreatitis     Spinal stenosis     Unspecified sleep apnea        Past Surgical History:  Past Surgical History:   Procedure Laterality Date    ATRIAL ABLATION SURGERY  2009    BACK SURGERY  2000    t5 t6 plate screw in neck    BACK SURGERY  01/2019    BREAST SURGERY Left 1987    lumpectomy left,  marker in right breast    CERVICAL SPINE SURGERY  8-14-12    removal of c4-7 plate and c 3 and 4 ACDF    CHOLECYSTECTOMY, OPEN  01/29/2014    attempted laparoscopic, lysis of adhesions    COLONOSCOPY  12/13     Dr. Hyun Georges  2008    GASTRIC BYPASS SURGERY  2005    KNEE ARTHROSCOPY Left 2011    X2    TONSILLECTOMY  age 2   [facility] UPPER GASTROINTESTINAL ENDOSCOPY  12/24/13    Dr. Jimmie Luna        Family History:   Family History   Problem Relation Age of Onset    Cancer Mother         colon    Stroke Mother     Cancer Father         lung    Heart Disease Father     Cancer Sister         breast    Cancer Brother         bladder    Heart Disease Brother     High Blood Pressure Brother  High Cholesterol Brother     Cancer Other         Rivera sarcoma    Cancer Sister         lung       Social History:   Social History     Tobacco Use    Smoking status: Former Smoker     Packs/day: 0.75     Years: 15.00     Pack years: 11.25     Types: Cigarettes     Quit date: 1997     Years since quittin.0    Smokeless tobacco: Never Used    Tobacco comment: STOPPED MARIJUANA 3 MONTHS AGO   Substance Use Topics    Alcohol use: Yes     Comment: OCCASSIONALLY        Allergies:  Patient has no known allergies. CurrentMedications:     Current Outpatient Medications:     montelukast (SINGULAIR) 10 MG tablet, Take 1 tablet by mouth nightly, Disp: 90 tablet, Rfl: 3    COVID-19 mRNA Virus Vaccine (COVID-19 MRNA VACC, MODERNA,) 100 MCG/0.5ML SUSP injection, Inject into the muscle once, Disp: , Rfl:     celecoxib (CELEBREX) 100 MG capsule, Take 100 mg by mouth 2 times daily, Disp: , Rfl:     EPINEPHrine (AUVI-Q) 0.3 MG/0.3ML SOAJ injection, Dispense 2 packs of 2 (total 4 devices). Use as directed, STAT for allergic reaction. , Disp: 2 each, Rfl: 0    triamcinolone (NASACORT) 55 MCG/ACT nasal inhaler, 1 spray by Each Nostril route 2 times daily, Disp: 1 Inhaler, Rfl: 5    cetirizine (ZYRTEC ALLERGY) 10 MG tablet, Take 1 tablet by mouth daily, Disp: 30 tablet, Rfl: 5    UNABLE TO FIND, Place 25 drops inside cheek as needed Sativa tincture Take every 1/2 hour as needed until pain subsided, Disp: , Rfl:     HYDROcodone-acetaminophen (NORCO)  MG per tablet, , Disp: , Rfl:     TRINTELLIX 10 MG TABS tablet, , Disp: , Rfl:     fexofenadine (ALLEGRA) 180 MG tablet, Take 180 mg by mouth daily, Disp: , Rfl:     buPROPion (WELLBUTRIN XL) 300 MG extended release tablet, TAKE 1 TABLET EVERY MORNING, Disp: 90 tablet, Rfl: 1    cyclobenzaprine (FLEXERIL) 10 MG tablet, Take 1 tablet by mouth 2 times daily as needed (muscle spasm), Disp: 180 tablet, Rfl: 1    omeprazole (PRILOSEC) 20 MG delayed release capsule, Take 1 capsule by mouth 2 times daily as needed (pyrosis), Disp: 180 capsule, Rfl: 1    Ferrous Sulfate (IRON) 325 (65 FE) MG TABS, Take 1 tablet by mouth daily, Disp: , Rfl:     docusate sodium (COLACE, DULCOLAX) 100 MG CAPS, Take 100 mg by mouth 2 times daily as needed for Constipation (Take to prevent constipation) for 30 doses. , Disp: 30 capsule, Rfl: 1    Coenzyme Q10 (COQ10 PO), Take  by mouth daily. , Disp: , Rfl:     Cyanocobalamin (VITAMIN B-12 CR PO), Take  by mouth daily. , Disp: , Rfl:     UNABLE TO FIND, OTC sun chlorra-5 tabs daily, Disp: , Rfl:       Physical Exam:      Vitals:    Vitals:    01/19/22 1117   BP: 102/68   Pulse: 86   Resp: 16   Temp: 98.1 °F (36.7 °C)   SpO2: 98%       158 lb 9.6 oz (71.9 kg)       Temp: 98.1 °F (36.7 °C) I @FLOWSTAT(6)@ IPulse: 86 I @FLOWSTAT(8)@ I BP: 102/68 I @ANSCAP(54)@; @GMXSUC(43)@ I Resp: 16 I @FLOWSTAT(9)@ I SpO2: 98 % I @FLOWSTAT(10)@ I   I   I   I Facility age limit for growth percentiles is 20 years. I     Facility age limit for growth percentiles is 20 years. Facility age limit for growth percentiles is 20 years. Facility age limit for growth percentiles is 20 years. Facility age limit for growth percentiles is 20 years.     Physical Exam:    Physical Exam        DATA:  Lab Review:    CBC:   Lab Results   Component Value Date    WBC 5.8 04/06/2021    RBC 5.08 04/06/2021    HGB 15.4 04/06/2021    HCT 47.2 04/06/2021    MCV 92.9 04/06/2021    MCH 30.3 04/06/2021    MCHC 32.6 04/06/2021    RDW 15.9 08/20/2015     04/06/2021          IgE   Date/Time Value Ref Range Status   04/06/2021 10:57 AM 16 <101 IU/mL Final     Comment:     [facility] [address] [address] [phone]      IgG   Date/Time Value Ref Range Status   04/06/2021 10:56  700 - 1600 mg/dL Final     Comment:     [facility] [address] [address] [phone]     IgA   Date/Time Value Ref Range Status   04/06/2021 10:57  70 - 400 mg/dL Final     Comment:     [facility] [address] [facility], [address] [facility] [phone]      IgM   Date/Time Value Ref Range Status   2021 10:56  40 - 230 mg/dL Final     Comment:     [facility] [address] [facility], [address] [facility] [phone]       No results found for: RAJESH   Rheumatoid Factor   Date/Time Value Ref Range Status   10/21/2021 04:27 PM 11 0 - 13 IU/mL Final     Comment:     Performed at [address] [place] [address], [address]          No results found for this or any previous visit. No results found for this or any previous visit. All current and previous medial labs have been reviewed and discussed with patient         Procedures: Allergy Testin2021    Assessment/Orders:    Diagnosis Orders   1. Exposure to mold     2. Non-seasonal allergic rhinitis due to pollen  montelukast (SINGULAIR) 10 MG tablet   3. Acute seasonal allergic rhinitis due to pollen     4. Seasonal allergic rhinitis due to fungal spores     5. Encounter for desensitization to allergens     6. Chronic allergic rhinitis due to pollen     7. Prophylactic immunotherapy     8. Mite allergy     9. Allergy to mold     10. Allergy to trees         All diagnostic imaging  have been personally reviewed     Plan:  Follow Up:1 year    May reduce allergy injections to weekly in red vial    Spent 20 minutes of face-to-face time with the patient with well more than half of the visit being dedicated to the discussion of the various symptom problems, provided education of medications and disease process, as well as discussion of a therapeutic plan for each. Face-to-face education time does not include any time that may have been spent for procedures.     (Please note that portions of this note may have been completed with a voice recognition program.  Efforts were made to edit the dictation but occasionally words are mis-transcribed.)         Signed:  JAYLEN Sultana - CNP  1/19/2022  11:46 AM

## 2022-01-19 NOTE — PROGRESS NOTES
After consent obtained/verified, allergy injection given in back of R/L arm(s). VIAL COLOR OF ALL VIALS TODAY IS Blue    ALLERGY INJECTION FROM VIAL A GIVEN left  UPPER ARM IN THE AMOUNT OF 0.25 ML    ALLERGY INJECTION FROM VIAL B GIVEN right upper ARM IN THE AMOUNT OF 0.25 ML    Documentation of vial injection specific to arm(s) noted on Allergy Immunotherapy Administration Form. Patient waited 30 minutes for observation. No      Patient tolerated well without adverse reaction WHILE IN OFFICE    SHOT REACTION TREATMENT INSTRUCTIONS    During the 30 minute wait after an allergy injection the following symptoms should be reported:    Itching other than at the injection site  Hives or swelling other than at the injection site  Redness other than at the injection site  Difficulty breathing  Chest tightness  Difficulty swallowing  Throat tightness    If these symptoms occur, NOTIFY PROVIDER and the following treatment should be administered:    1. Epinephrine/Auvi Q 1:1000 IM - 0.3 ml if > 66 lbs or more, 0.15 ml if 33 - 63 lbs, or 0.1 ml if <33 lbs     2. Diphenhydramine - give all intramuscular:     2 to <6 years (off-label use): 6.25 mg,    6 to <12 years: 12.5 to 25 mg;    ?12 years: 25-50 mg.    3.  Famotidine:  Adults 40 mg oral    Adolescents age 12 years and >88 lbs: 40 mg    Children and Adolescents ? 12years of age: Initial: 0.25 mg/kg/dose  every 12 hours (maximum daily dose: 40 mg/day)    Epi/Auvi Q dose may me repeated in 5-15 minutes if adequate resolution of symptoms does not occur    Patient should be observed for at least one hour after final Epi/Auvi Q dose and must be seen by provider. Patients cannot drive themselves if they have received diphenhydramine.

## 2022-01-24 ENCOUNTER — NURSE ONLY (OUTPATIENT)
Dept: ALLERGY | Age: 62
End: 2022-01-24
Payer: COMMERCIAL

## 2022-01-24 VITALS
HEART RATE: 89 BPM | RESPIRATION RATE: 18 BRPM | OXYGEN SATURATION: 98 % | SYSTOLIC BLOOD PRESSURE: 118 MMHG | DIASTOLIC BLOOD PRESSURE: 64 MMHG | TEMPERATURE: 97.3 F

## 2022-01-24 DIAGNOSIS — J30.1 ALLERGY TO TREES: ICD-10-CM

## 2022-01-24 DIAGNOSIS — Z91.048 ALLERGY TO MOLD: ICD-10-CM

## 2022-01-24 DIAGNOSIS — Z51.6 ENCOUNTER FOR DESENSITIZATION TO ALLERGENS: Primary | ICD-10-CM

## 2022-01-24 PROCEDURE — 95117 IMMUNOTHERAPY INJECTIONS: CPT | Performed by: NURSE PRACTITIONER

## 2022-01-24 NOTE — PROGRESS NOTES
After consent obtained/verified, allergy injection given in back of R/L arm(s). VIAL COLOR OF ALL VIALS TODAY IS blue    ALLERGY INJECTION FROM VIAL A GIVEN right  UPPER ARM IN THE AMOUNT OF 0.30 ML    ALLERGY INJECTION FROM VIAL B GIVEN left upper ARM IN THE AMOUNT OF 0.30 ML      Documentation of vial injection specific to arm(s) noted on Allergy Immunotherapy Administration Form. Patient waited 30 minutes for observation. No      Patient tolerated well without adverse reaction WHILE IN OFFICE    SHOT REACTION TREATMENT INSTRUCTIONS    During the 30 minute wait after an allergy injection the following symptoms should be reported:    Itching other than at the injection site  Hives or swelling other than at the injection site  Redness other than at the injection site  Difficulty breathing  Chest tightness  Difficulty swallowing  Throat tightness    If these symptoms occur, NOTIFY PROVIDER and the following treatment should be administered:    1. Epinephrine/Auvi Q 1:1000 IM - 0.3 ml if > 66 lbs or more, 0.15 ml if 33 - 63 lbs, or 0.1 ml if <33 lbs     2. Diphenhydramine - give all intramuscular:     2 to <6 years (off-label use): 6.25 mg,    6 to <12 years: 12.5 to 25 mg;    ?12 years: 25-50 mg.    3.  Famotidine:  Adults 40 mg oral    Adolescents age 12 years and >88 lbs: 40 mg    Children and Adolescents ? 12years of age: Initial: 0.25 mg/kg/dose  every 12 hours (maximum daily dose: 40 mg/day)    Epi/Auvi Q dose may me repeated in 5-15 minutes if adequate resolution of symptoms does not occur    Patient should be observed for at least one hour after final Epi/Auvi Q dose and must be seen by provider. Patients cannot drive themselves if they have received diphenhydramine.

## 2022-01-27 ENCOUNTER — NURSE ONLY (OUTPATIENT)
Dept: ALLERGY | Age: 62
End: 2022-01-27
Payer: COMMERCIAL

## 2022-01-27 VITALS
DIASTOLIC BLOOD PRESSURE: 70 MMHG | HEART RATE: 101 BPM | SYSTOLIC BLOOD PRESSURE: 110 MMHG | TEMPERATURE: 97.6 F | RESPIRATION RATE: 20 BRPM

## 2022-01-27 DIAGNOSIS — Z91.048 ALLERGY TO MOLD: ICD-10-CM

## 2022-01-27 DIAGNOSIS — Z51.6 ENCOUNTER FOR DESENSITIZATION TO ALLERGENS: Primary | ICD-10-CM

## 2022-01-27 DIAGNOSIS — J30.1 ALLERGY TO TREES: ICD-10-CM

## 2022-01-27 PROCEDURE — 95117 IMMUNOTHERAPY INJECTIONS: CPT | Performed by: NURSE PRACTITIONER

## 2022-01-27 NOTE — PROGRESS NOTES
After consent obtained/verified, allergy injection given in back of R/L arm(s). VIAL COLOR OF ALL VIALS TODAY IS blue    ALLERGY INJECTION FROM VIAL A GIVEN left  UPPER ARM IN THE AMOUNT OF 0.35 ML    ALLERGY INJECTION FROM VIAL B GIVEN right upper ARM IN THE AMOUNT OF 0.35 ML          Documentation of vial injection specific to arm(s) noted on Allergy Immunotherapy Administration Form. Patient waited 30 minutes for observation. No      Patient tolerated well without adverse reaction WHILE IN OFFICE    SHOT REACTION TREATMENT INSTRUCTIONS    During the 30 minute wait after an allergy injection the following symptoms should be reported:    Itching other than at the injection site  Hives or swelling other than at the injection site  Redness other than at the injection site  Difficulty breathing  Chest tightness  Difficulty swallowing  Throat tightness    If these symptoms occur, NOTIFY PROVIDER and the following treatment should be administered:    1. Epinephrine/Auvi Q 1:1000 IM - 0.3 ml if > 66 lbs or more, 0.15 ml if 33 - 63 lbs, or 0.1 ml if <33 lbs     2. Diphenhydramine - give all intramuscular:     2 to <6 years (off-label use): 6.25 mg,    6 to <12 years: 12.5 to 25 mg;    ?12 years: 25-50 mg.    3.  Famotidine:  Adults 40 mg oral    Adolescents age 12 years and >88 lbs: 40 mg    Children and Adolescents ? 12years of age: Initial: 0.25 mg/kg/dose  every 12 hours (maximum daily dose: 40 mg/day)    Epi/Auvi Q dose may me repeated in 5-15 minutes if adequate resolution of symptoms does not occur    Patient should be observed for at least one hour after final Epi/Auvi Q dose and must be seen by provider. Patients cannot drive themselves if they have received diphenhydramine.

## 2022-01-31 ENCOUNTER — NURSE ONLY (OUTPATIENT)
Dept: ALLERGY | Age: 62
End: 2022-01-31
Payer: COMMERCIAL

## 2022-01-31 DIAGNOSIS — Z91.048 ALLERGY TO MOLD: ICD-10-CM

## 2022-01-31 DIAGNOSIS — Z51.6 ENCOUNTER FOR DESENSITIZATION TO ALLERGENS: Primary | ICD-10-CM

## 2022-01-31 DIAGNOSIS — J30.1 ALLERGY TO TREES: ICD-10-CM

## 2022-01-31 PROCEDURE — 95117 IMMUNOTHERAPY INJECTIONS: CPT | Performed by: NURSE PRACTITIONER

## 2022-01-31 NOTE — PROGRESS NOTES
After consent obtained/verified, allergy injection given in back of R/L arm(s). VIAL COLOR OF ALL VIALS TODAY IS BLUE    ALLERGY INJECTION FROM VIAL A GIVEN right  UPPER ARM IN THE AMOUNT OF 0.40 ML    ALLERGY INJECTION FROM VIAL B GIVEN left upper ARM IN THE AMOUNT OF 0.40 ML          Documentation of vial injection specific to arm(s) noted on Allergy Immunotherapy Administration Form. Patient waited 30 minutes for observation. No      Patient tolerated well without adverse reaction WHILE IN OFFICE    SHOT REACTION TREATMENT INSTRUCTIONS    During the 30 minute wait after an allergy injection the following symptoms should be reported:    Itching other than at the injection site  Hives or swelling other than at the injection site  Redness other than at the injection site  Difficulty breathing  Chest tightness  Difficulty swallowing  Throat tightness    If these symptoms occur, NOTIFY PROVIDER and the following treatment should be administered:    1. Epinephrine/Auvi Q 1:1000 IM - 0.3 ml if > 66 lbs or more, 0.15 ml if 33 - 63 lbs, or 0.1 ml if <33 lbs     2. Diphenhydramine - give all intramuscular:     2 to <6 years (off-label use): 6.25 mg,    6 to <12 years: 12.5 to 25 mg;    ?12 years: 25-50 mg.    3.  Famotidine:  Adults 40 mg oral    Adolescents age 12 years and >88 lbs: 40 mg    Children and Adolescents ? 12years of age: Initial: 0.25 mg/kg/dose  every 12 hours (maximum daily dose: 40 mg/day)    Epi/Auvi Q dose may me repeated in 5-15 minutes if adequate resolution of symptoms does not occur    Patient should be observed for at least one hour after final Epi/Auvi Q dose and must be seen by provider. Patients cannot drive themselves if they have received diphenhydramine.

## 2022-02-07 ENCOUNTER — NURSE ONLY (OUTPATIENT)
Dept: ALLERGY | Age: 62
End: 2022-02-07
Payer: COMMERCIAL

## 2022-02-07 VITALS
SYSTOLIC BLOOD PRESSURE: 112 MMHG | RESPIRATION RATE: 14 BRPM | OXYGEN SATURATION: 97 % | DIASTOLIC BLOOD PRESSURE: 70 MMHG | TEMPERATURE: 97.5 F | HEART RATE: 91 BPM

## 2022-02-07 DIAGNOSIS — J30.1 ALLERGY TO TREES: ICD-10-CM

## 2022-02-07 DIAGNOSIS — Z91.048 ALLERGY TO MOLD: ICD-10-CM

## 2022-02-07 DIAGNOSIS — Z51.6 ENCOUNTER FOR DESENSITIZATION TO ALLERGENS: Primary | ICD-10-CM

## 2022-02-07 PROCEDURE — 95117 IMMUNOTHERAPY INJECTIONS: CPT | Performed by: NURSE PRACTITIONER

## 2022-02-07 NOTE — PROGRESS NOTES
After consent obtained/verified, allergy injection given in back of R/L arm(s). VIAL COLOR OF ALL VIALS TODAY IS BLUE    ALLERGY INJECTION FROM VIAL A GIVEN left  UPPER ARM IN THE AMOUNT OF 0.45 ML    ALLERGY INJECTION FROM VIAL B GIVEN right upper ARM IN THE AMOUNT OF 0.45 ML      Documentation of vial injection specific to arm(s) noted on Allergy Immunotherapy Administration Form. Patient waited 30 minutes for observation. No          SHOT REACTION TREATMENT INSTRUCTIONS    During the 30 minute wait after an allergy injection the following symptoms should be reported:    Itching other than at the injection site  Hives or swelling other than at the injection site  Redness other than at the injection site  Difficulty breathing  Chest tightness  Difficulty swallowing  Throat tightness    If these symptoms occur, NOTIFY PROVIDER and the following treatment should be administered:    1. Epinephrine/Auvi Q 1:1000 IM - 0.3 ml if > 66 lbs or more, 0.15 ml if 33 - 63 lbs, or 0.1 ml if <33 lbs     2. Diphenhydramine - give all intramuscular:     2 to <6 years (off-label use): 6.25 mg,    6 to <12 years: 12.5 to 25 mg;    ?12 years: 25-50 mg.    3.  Famotidine:  Adults 40 mg oral    Adolescents age 12 years and >88 lbs: 40 mg    Children and Adolescents ? 12years of age: Initial: 0.25 mg/kg/dose  every 12 hours (maximum daily dose: 40 mg/day)    Epi/Auvi Q dose may me repeated in 5-15 minutes if adequate resolution of symptoms does not occur    Patient should be observed for at least one hour after final Epi/Auvi Q dose and must be seen by provider. Patients cannot drive themselves if they have received diphenhydramine.

## 2022-02-10 ENCOUNTER — NURSE ONLY (OUTPATIENT)
Dept: ALLERGY | Age: 62
End: 2022-02-10
Payer: COMMERCIAL

## 2022-02-10 VITALS
TEMPERATURE: 97.6 F | RESPIRATION RATE: 16 BRPM | OXYGEN SATURATION: 98 % | DIASTOLIC BLOOD PRESSURE: 70 MMHG | SYSTOLIC BLOOD PRESSURE: 110 MMHG | HEART RATE: 74 BPM

## 2022-02-10 DIAGNOSIS — J30.1 ALLERGY TO TREES: Primary | ICD-10-CM

## 2022-02-10 DIAGNOSIS — Z91.048 ALLERGY TO MOLD: ICD-10-CM

## 2022-02-10 DIAGNOSIS — Z51.6 ENCOUNTER FOR DESENSITIZATION TO ALLERGENS: ICD-10-CM

## 2022-02-10 PROCEDURE — 95117 IMMUNOTHERAPY INJECTIONS: CPT | Performed by: NURSE PRACTITIONER

## 2022-02-10 NOTE — PROGRESS NOTES
After consent obtained/verified, allergy injection given in back of R/L arm(s). VIAL COLOR OF ALL VIALS TODAY IS Blue    ALLERGY INJECTION FROM VIAL A GIVEN right  UPPER ARM IN THE AMOUNT OF 0.50 ML    ALLERGY INJECTION FROM VIAL B GIVEN left upper ARM IN THE AMOUNT OF 0.50 ML      Documentation of vial injection specific to arm(s) noted on Allergy Immunotherapy Administration Form. Patient waited 30 minutes for observation. No      Patient tolerated well without adverse reaction WHILE IN OFFICE    SHOT REACTION TREATMENT INSTRUCTIONS    During the 30 minute wait after an allergy injection the following symptoms should be reported:    Itching other than at the injection site  Hives or swelling other than at the injection site  Redness other than at the injection site  Difficulty breathing  Chest tightness  Difficulty swallowing  Throat tightness    If these symptoms occur, NOTIFY PROVIDER and the following treatment should be administered:    1. Epinephrine/Auvi Q 1:1000 IM - 0.3 ml if > 66 lbs or more, 0.15 ml if 33 - 63 lbs, or 0.1 ml if <33 lbs     2. Diphenhydramine - give all intramuscular:     2 to <6 years (off-label use): 6.25 mg,    6 to <12 years: 12.5 to 25 mg;    ?12 years: 25-50 mg.    3.  Famotidine:  Adults 40 mg oral    Adolescents age 12 years and >88 lbs: 40 mg    Children and Adolescents ? 12years of age: Initial: 0.25 mg/kg/dose  every 12 hours (maximum daily dose: 40 mg/day)    Epi/Auvi Q dose may me repeated in 5-15 minutes if adequate resolution of symptoms does not occur    Patient should be observed for at least one hour after final Epi/Auvi Q dose and must be seen by provider. Patients cannot drive themselves if they have received diphenhydramine.

## 2022-02-14 ENCOUNTER — NURSE ONLY (OUTPATIENT)
Dept: ALLERGY | Age: 62
End: 2022-02-14
Payer: COMMERCIAL

## 2022-02-14 VITALS — SYSTOLIC BLOOD PRESSURE: 110 MMHG | HEART RATE: 68 BPM | DIASTOLIC BLOOD PRESSURE: 64 MMHG | TEMPERATURE: 97.3 F

## 2022-02-14 DIAGNOSIS — Z51.6 ENCOUNTER FOR DESENSITIZATION TO ALLERGENS: Primary | ICD-10-CM

## 2022-02-14 DIAGNOSIS — Z91.048 ALLERGY TO MOLD: ICD-10-CM

## 2022-02-14 DIAGNOSIS — J30.1 ALLERGY TO TREES: ICD-10-CM

## 2022-02-14 PROCEDURE — 95117 IMMUNOTHERAPY INJECTIONS: CPT | Performed by: NURSE PRACTITIONER

## 2022-02-14 NOTE — PROGRESS NOTES
After consent obtained/verified, allergy injection given in back of R/L arm(s). VIAL COLOR OF ALL VIALS TODAY IS SAHIL    ALLERGY INJECTION FROM VIAL A GIVEN right  UPPER ARM IN THE AMOUNT OF 0.05 ML    ALLERGY INJECTION FROM VIAL B GIVEN left upper ARM IN THE AMOUNT OF 0.05 ML    Documentation of vial injection specific to arm(s) noted on Allergy Immunotherapy Administration Form. Patient waited 30 minutes for observation. No      Patient tolerated well without adverse reaction WHILE IN OFFICE    SHOT REACTION TREATMENT INSTRUCTIONS    During the 30 minute wait after an allergy injection the following symptoms should be reported:    Itching other than at the injection site  Hives or swelling other than at the injection site  Redness other than at the injection site  Difficulty breathing  Chest tightness  Difficulty swallowing  Throat tightness    If these symptoms occur, NOTIFY PROVIDER and the following treatment should be administered:    1. Epinephrine/Auvi Q 1:1000 IM - 0.3 ml if > 66 lbs or more, 0.15 ml if 33 - 63 lbs, or 0.1 ml if <33 lbs     2. Diphenhydramine - give all intramuscular:     2 to <6 years (off-label use): 6.25 mg,    6 to <12 years: 12.5 to 25 mg;    ?12 years: 25-50 mg.    3.  Famotidine:  Adults 40 mg oral    Adolescents age 12 years and >88 lbs: 40 mg    Children and Adolescents ? 12years of age: Initial: 0.25 mg/kg/dose  every 12 hours (maximum daily dose: 40 mg/day)    Epi/Auvi Q dose may me repeated in 5-15 minutes if adequate resolution of symptoms does not occur    Patient should be observed for at least one hour after final Epi/Auvi Q dose and must be seen by provider. Patients cannot drive themselves if they have received diphenhydramine.

## 2022-02-24 ENCOUNTER — NURSE ONLY (OUTPATIENT)
Dept: ALLERGY | Age: 62
End: 2022-02-24
Payer: COMMERCIAL

## 2022-02-24 VITALS
DIASTOLIC BLOOD PRESSURE: 70 MMHG | SYSTOLIC BLOOD PRESSURE: 122 MMHG | HEART RATE: 85 BPM | OXYGEN SATURATION: 98 % | TEMPERATURE: 96.6 F | RESPIRATION RATE: 17 BRPM

## 2022-02-24 DIAGNOSIS — Z51.6 ENCOUNTER FOR DESENSITIZATION TO ALLERGENS: Primary | ICD-10-CM

## 2022-02-24 DIAGNOSIS — J30.1 ALLERGY TO TREES: ICD-10-CM

## 2022-02-24 DIAGNOSIS — Z91.048 ALLERGY TO MOLD: ICD-10-CM

## 2022-02-24 PROCEDURE — 95117 IMMUNOTHERAPY INJECTIONS: CPT | Performed by: NURSE PRACTITIONER

## 2022-02-28 ENCOUNTER — NURSE ONLY (OUTPATIENT)
Dept: ALLERGY | Age: 62
End: 2022-02-28
Payer: COMMERCIAL

## 2022-02-28 VITALS
TEMPERATURE: 97.7 F | DIASTOLIC BLOOD PRESSURE: 70 MMHG | RESPIRATION RATE: 14 BRPM | SYSTOLIC BLOOD PRESSURE: 118 MMHG | HEART RATE: 72 BPM

## 2022-02-28 DIAGNOSIS — Z91.048 ALLERGY TO MOLD: ICD-10-CM

## 2022-02-28 DIAGNOSIS — J30.1 ALLERGY TO TREES: ICD-10-CM

## 2022-02-28 DIAGNOSIS — Z51.6 ENCOUNTER FOR DESENSITIZATION TO ALLERGENS: Primary | ICD-10-CM

## 2022-02-28 PROCEDURE — 95117 IMMUNOTHERAPY INJECTIONS: CPT | Performed by: NURSE PRACTITIONER

## 2022-03-03 ENCOUNTER — NURSE ONLY (OUTPATIENT)
Dept: ALLERGY | Age: 62
End: 2022-03-03
Payer: COMMERCIAL

## 2022-03-03 VITALS
DIASTOLIC BLOOD PRESSURE: 70 MMHG | HEART RATE: 71 BPM | TEMPERATURE: 96.4 F | SYSTOLIC BLOOD PRESSURE: 110 MMHG | RESPIRATION RATE: 16 BRPM | OXYGEN SATURATION: 98 %

## 2022-03-03 DIAGNOSIS — J30.1 ALLERGY TO TREES: ICD-10-CM

## 2022-03-03 DIAGNOSIS — Z51.6 ENCOUNTER FOR DESENSITIZATION TO ALLERGENS: Primary | ICD-10-CM

## 2022-03-03 DIAGNOSIS — Z91.048 ALLERGY TO MOLD: ICD-10-CM

## 2022-03-03 PROCEDURE — 95117 IMMUNOTHERAPY INJECTIONS: CPT | Performed by: NURSE PRACTITIONER

## 2022-03-03 NOTE — PROGRESS NOTES
After consent obtained/verified, allergy injection given in back of R/L arm(s). VIAL COLOR OF ALL VIALS TODAY IS yellow    ALLERGY INJECTION FROM VIAL A GIVEN left  UPPER ARM IN THE AMOUNT OF 0.15 ML    ALLERGY INJECTION FROM VIAL B GIVEN right upper ARM IN THE AMOUNT OF 0.15 ML          Documentation of vial injection specific to arm(s) noted on Allergy Immunotherapy Administration Form. Patient waited 30 minutes for observation. No      Patient tolerated well without adverse reaction WHILE IN OFFICE    SHOT REACTION TREATMENT INSTRUCTIONS    During the 30 minute wait after an allergy injection the following symptoms should be reported:    Itching other than at the injection site  Hives or swelling other than at the injection site  Redness other than at the injection site  Difficulty breathing  Chest tightness  Difficulty swallowing  Throat tightness    If these symptoms occur, NOTIFY PROVIDER and the following treatment should be administered:    1. Epinephrine/Auvi Q 1:1000 IM - 0.3 ml if > 66 lbs or more, 0.15 ml if 33 - 63 lbs, or 0.1 ml if <33 lbs     2. Diphenhydramine - give all intramuscular:     2 to <6 years (off-label use): 6.25 mg,    6 to <12 years: 12.5 to 25 mg;    ?12 years: 25-50 mg.    3.  Famotidine:  Adults 40 mg oral    Adolescents age 12 years and >88 lbs: 40 mg    Children and Adolescents ? 12years of age: Initial: 0.25 mg/kg/dose  every 12 hours (maximum daily dose: 40 mg/day)    Epi/Auvi Q dose may me repeated in 5-15 minutes if adequate resolution of symptoms does not occur    Patient should be observed for at least one hour after final Epi/Auvi Q dose and must be seen by provider. Patients cannot drive themselves if they have received diphenhydramine.

## 2022-03-07 ENCOUNTER — NURSE ONLY (OUTPATIENT)
Dept: ALLERGY | Age: 62
End: 2022-03-07
Payer: COMMERCIAL

## 2022-03-07 VITALS
SYSTOLIC BLOOD PRESSURE: 120 MMHG | DIASTOLIC BLOOD PRESSURE: 60 MMHG | TEMPERATURE: 97 F | HEART RATE: 96 BPM | OXYGEN SATURATION: 98 % | RESPIRATION RATE: 17 BRPM

## 2022-03-07 DIAGNOSIS — J30.1 ALLERGY TO TREES: ICD-10-CM

## 2022-03-07 DIAGNOSIS — Z91.048 ALLERGY TO MOLD: ICD-10-CM

## 2022-03-07 DIAGNOSIS — Z51.6 ENCOUNTER FOR DESENSITIZATION TO ALLERGENS: Primary | ICD-10-CM

## 2022-03-07 PROCEDURE — 95117 IMMUNOTHERAPY INJECTIONS: CPT | Performed by: NURSE PRACTITIONER

## 2022-03-10 ENCOUNTER — NURSE ONLY (OUTPATIENT)
Dept: ALLERGY | Age: 62
End: 2022-03-10
Payer: COMMERCIAL

## 2022-03-10 VITALS
HEART RATE: 108 BPM | TEMPERATURE: 96.6 F | DIASTOLIC BLOOD PRESSURE: 70 MMHG | SYSTOLIC BLOOD PRESSURE: 114 MMHG | OXYGEN SATURATION: 94 % | RESPIRATION RATE: 14 BRPM

## 2022-03-10 DIAGNOSIS — Z91.048 ALLERGY TO MOLD: ICD-10-CM

## 2022-03-10 DIAGNOSIS — J30.1 ALLERGY TO TREES: ICD-10-CM

## 2022-03-10 DIAGNOSIS — Z51.6 ENCOUNTER FOR DESENSITIZATION TO ALLERGENS: Primary | ICD-10-CM

## 2022-03-10 PROCEDURE — 95117 IMMUNOTHERAPY INJECTIONS: CPT | Performed by: NURSE PRACTITIONER

## 2022-03-10 NOTE — PROGRESS NOTES
After consent obtained/verified, allergy injection given in back of R/L arm(s). VIAL COLOR OF ALL VIALS TODAY IS YELLOW    ALLERGY INJECTION FROM VIAL A GIVEN left  UPPER ARM IN THE AMOUNT OF 0.25 ML    ALLERGY INJECTION FROM VIAL B GIVEN right upper ARM IN THE AMOUNT OF 0.25 ML    * ML      Documentation of vial injection specific to arm(s) noted on Allergy Immunotherapy Administration Form. Patient waited 30 minutes for observation. No      Patient tolerated well without adverse reaction WHILE IN OFFICE    SHOT REACTION TREATMENT INSTRUCTIONS    During the 30 minute wait after an allergy injection the following symptoms should be reported:    Itching other than at the injection site  Hives or swelling other than at the injection site  Redness other than at the injection site  Difficulty breathing  Chest tightness  Difficulty swallowing  Throat tightness    If these symptoms occur, NOTIFY PROVIDER and the following treatment should be administered:    1. Epinephrine/Auvi Q 1:1000 IM - 0.3 ml if > 66 lbs or more, 0.15 ml if 33 - 63 lbs, or 0.1 ml if <33 lbs     2. Diphenhydramine - give all intramuscular:     2 to <6 years (off-label use): 6.25 mg,    6 to <12 years: 12.5 to 25 mg;    ?12 years: 25-50 mg.    3.  Famotidine:  Adults 40 mg oral    Adolescents age 12 years and >88 lbs: 40 mg    Children and Adolescents ? 12years of age: Initial: 0.25 mg/kg/dose  every 12 hours (maximum daily dose: 40 mg/day)    Epi/Auvi Q dose may me repeated in 5-15 minutes if adequate resolution of symptoms does not occur    Patient should be observed for at least one hour after final Epi/Auvi Q dose and must be seen by provider. Patients cannot drive themselves if they have received diphenhydramine.

## 2022-03-21 ENCOUNTER — NURSE ONLY (OUTPATIENT)
Dept: ALLERGY | Age: 62
End: 2022-03-21
Payer: COMMERCIAL

## 2022-03-21 VITALS — TEMPERATURE: 97.4 F

## 2022-03-21 DIAGNOSIS — J30.1 ALLERGY TO TREES: ICD-10-CM

## 2022-03-21 DIAGNOSIS — Z91.048 ALLERGY TO MOLD: ICD-10-CM

## 2022-03-21 DIAGNOSIS — Z51.6 ENCOUNTER FOR DESENSITIZATION TO ALLERGENS: Primary | ICD-10-CM

## 2022-03-21 PROCEDURE — 95117 IMMUNOTHERAPY INJECTIONS: CPT | Performed by: NURSE PRACTITIONER

## 2022-03-21 NOTE — PROGRESS NOTES
After consent obtained/verified, allergy injection given in back of R/L arm(s). VIAL COLOR OF ALL VIALS TODAY IS yellow    ALLERGY INJECTION FROM VIAL A GIVEN right  UPPER ARM IN THE AMOUNT OF 0.25 ML    ALLERGY INJECTION FROM VIAL B GIVEN left upper ARM IN THE AMOUNT OF 0.25 ML        Documentation of vial injection specific to arm(s) noted on Allergy Immunotherapy Administration Form. Patient waited 30 minutes for observation. No      Patient tolerated well without adverse reaction WHILE IN OFFICE    SHOT REACTION TREATMENT INSTRUCTIONS    During the 30 minute wait after an allergy injection the following symptoms should be reported:    Itching other than at the injection site  Hives or swelling other than at the injection site  Redness other than at the injection site  Difficulty breathing  Chest tightness  Difficulty swallowing  Throat tightness    If these symptoms occur, NOTIFY PROVIDER and the following treatment should be administered:    1. Epinephrine/Auvi Q 1:1000 IM - 0.3 ml if > 66 lbs or more, 0.15 ml if 33 - 63 lbs, or 0.1 ml if <33 lbs     2. Diphenhydramine - give all intramuscular:     2 to <6 years (off-label use): 6.25 mg,    6 to <12 years: 12.5 to 25 mg;    ?12 years: 25-50 mg.    3.  Famotidine:  Adults 40 mg oral    Adolescents age 12 years and >88 lbs: 40 mg    Children and Adolescents ? 12years of age: Initial: 0.25 mg/kg/dose  every 12 hours (maximum daily dose: 40 mg/day)    Epi/Auvi Q dose may me repeated in 5-15 minutes if adequate resolution of symptoms does not occur    Patient should be observed for at least one hour after final Epi/Auvi Q dose and must be seen by provider. Patients cannot drive themselves if they have received diphenhydramine.

## 2022-03-24 ENCOUNTER — NURSE ONLY (OUTPATIENT)
Dept: ALLERGY | Age: 62
End: 2022-03-24
Payer: COMMERCIAL

## 2022-03-24 VITALS
OXYGEN SATURATION: 98 % | HEART RATE: 88 BPM | SYSTOLIC BLOOD PRESSURE: 114 MMHG | TEMPERATURE: 97.2 F | DIASTOLIC BLOOD PRESSURE: 62 MMHG | RESPIRATION RATE: 14 BRPM

## 2022-03-24 DIAGNOSIS — J30.1 ALLERGY TO TREES: ICD-10-CM

## 2022-03-24 DIAGNOSIS — Z51.6 ENCOUNTER FOR DESENSITIZATION TO ALLERGENS: Primary | ICD-10-CM

## 2022-03-24 DIAGNOSIS — Z91.048 ALLERGY TO MOLD: ICD-10-CM

## 2022-03-24 PROCEDURE — 95117 IMMUNOTHERAPY INJECTIONS: CPT | Performed by: NURSE PRACTITIONER

## 2022-03-24 RX ORDER — PREDNISONE 20 MG/1
TABLET ORAL
COMMUNITY
Start: 2022-03-17

## 2022-03-28 ENCOUNTER — NURSE ONLY (OUTPATIENT)
Dept: ALLERGY | Age: 62
End: 2022-03-28
Payer: COMMERCIAL

## 2022-03-28 VITALS
RESPIRATION RATE: 18 BRPM | OXYGEN SATURATION: 98 % | TEMPERATURE: 97.3 F | DIASTOLIC BLOOD PRESSURE: 68 MMHG | HEART RATE: 86 BPM | SYSTOLIC BLOOD PRESSURE: 118 MMHG

## 2022-03-28 DIAGNOSIS — Z91.048 ALLERGY TO MOLD: ICD-10-CM

## 2022-03-28 DIAGNOSIS — Z51.6 ENCOUNTER FOR DESENSITIZATION TO ALLERGENS: Primary | ICD-10-CM

## 2022-03-28 DIAGNOSIS — J30.1 ALLERGY TO TREES: ICD-10-CM

## 2022-03-28 PROCEDURE — 95117 IMMUNOTHERAPY INJECTIONS: CPT | Performed by: NURSE PRACTITIONER

## 2022-03-28 NOTE — PROGRESS NOTES
After consent obtained/verified, allergy injection given in back of R/L arm(s). VIAL COLOR OF ALL VIALS TODAY IS yellow    ALLERGY INJECTION FROM VIAL A GIVEN right  UPPER ARM IN THE AMOUNT OF 0.35 ML    ALLERGY INJECTION FROM VIAL B GIVEN left upper ARM IN THE AMOUNT OF 0.35 ML          Documentation of vial injection specific to arm(s) noted on Allergy Immunotherapy Administration Form. Patient waited 30 minutes for observation. No      Patient tolerated well without adverse reaction WHILE IN OFFICE    SHOT REACTION TREATMENT INSTRUCTIONS    During the 30 minute wait after an allergy injection the following symptoms should be reported:    Itching other than at the injection site  Hives or swelling other than at the injection site  Redness other than at the injection site  Difficulty breathing  Chest tightness  Difficulty swallowing  Throat tightness    If these symptoms occur, NOTIFY PROVIDER and the following treatment should be administered:    1. Epinephrine/Auvi Q 1:1000 IM - 0.3 ml if > 66 lbs or more, 0.15 ml if 33 - 63 lbs, or 0.1 ml if <33 lbs     2. Diphenhydramine - give all intramuscular:     2 to <6 years (off-label use): 6.25 mg,    6 to <12 years: 12.5 to 25 mg;    ?12 years: 25-50 mg.    3.  Famotidine:  Adults 40 mg oral    Adolescents age 12 years and >88 lbs: 40 mg    Children and Adolescents ? 12years of age: Initial: 0.25 mg/kg/dose  every 12 hours (maximum daily dose: 40 mg/day)    Epi/Auvi Q dose may me repeated in 5-15 minutes if adequate resolution of symptoms does not occur    Patient should be observed for at least one hour after final Epi/Auvi Q dose and must be seen by provider. Patients cannot drive themselves if they have received diphenhydramine.

## 2022-04-07 ENCOUNTER — NURSE ONLY (OUTPATIENT)
Dept: ALLERGY | Age: 62
End: 2022-04-07
Payer: COMMERCIAL

## 2022-04-07 VITALS
OXYGEN SATURATION: 98 % | DIASTOLIC BLOOD PRESSURE: 70 MMHG | SYSTOLIC BLOOD PRESSURE: 108 MMHG | HEART RATE: 91 BPM | TEMPERATURE: 97.6 F

## 2022-04-07 DIAGNOSIS — Z91.048 ALLERGY TO MOLD: ICD-10-CM

## 2022-04-07 DIAGNOSIS — Z51.6 ENCOUNTER FOR DESENSITIZATION TO ALLERGENS: Primary | ICD-10-CM

## 2022-04-07 PROCEDURE — 95117 IMMUNOTHERAPY INJECTIONS: CPT | Performed by: NURSE PRACTITIONER

## 2022-04-07 RX ORDER — AMOXICILLIN AND CLAVULANATE POTASSIUM 875; 125 MG/1; MG/1
TABLET, FILM COATED ORAL
COMMUNITY
Start: 2022-04-04 | End: 2022-04-21 | Stop reason: ALTCHOICE

## 2022-04-07 RX ORDER — AMOXICILLIN 500 MG/1
CAPSULE ORAL
COMMUNITY
Start: 2022-04-04 | End: 2022-04-21 | Stop reason: ALTCHOICE

## 2022-04-07 NOTE — PROGRESS NOTES
After consent obtained/verified, allergy injection given in back of R/L arm(s). VIAL COLOR OF ALL VIALS TODAY IS YELLOW    ALLERGY INJECTION FROM VIAL A GIVEN left  UPPER ARM IN THE AMOUNT OF 0.40 ML    ALLERGY INJECTION FROM VIAL B GIVEN right upper ARM IN THE AMOUNT OF 0.40 ML          Documentation of vial injection specific to arm(s) noted on Allergy Immunotherapy Administration Form. Patient waited 30 minutes for observation. No          SHOT REACTION TREATMENT INSTRUCTIONS    During the 30 minute wait after an allergy injection the following symptoms should be reported:    Itching other than at the injection site  Hives or swelling other than at the injection site  Redness other than at the injection site  Difficulty breathing  Chest tightness  Difficulty swallowing  Throat tightness    If these symptoms occur, NOTIFY PROVIDER and the following treatment should be administered:    1. Epinephrine/Auvi Q 1:1000 IM - 0.3 ml if > 66 lbs or more, 0.15 ml if 33 - 63 lbs, or 0.1 ml if <33 lbs     2. Diphenhydramine - give all intramuscular:     2 to <6 years (off-label use): 6.25 mg,    6 to <12 years: 12.5 to 25 mg;    ?12 years: 25-50 mg.    3.  Famotidine:  Adults 40 mg oral    Adolescents age 12 years and >88 lbs: 40 mg    Children and Adolescents ? 12years of age: Initial: 0.25 mg/kg/dose  every 12 hours (maximum daily dose: 40 mg/day)    Epi/Auvi Q dose may me repeated in 5-15 minutes if adequate resolution of symptoms does not occur    Patient should be observed for at least one hour after final Epi/Auvi Q dose and must be seen by provider. Patients cannot drive themselves if they have received diphenhydramine.

## 2022-04-21 ENCOUNTER — NURSE ONLY (OUTPATIENT)
Dept: ALLERGY | Age: 62
End: 2022-04-21
Payer: COMMERCIAL

## 2022-04-21 VITALS
HEART RATE: 84 BPM | RESPIRATION RATE: 16 BRPM | TEMPERATURE: 97.3 F | OXYGEN SATURATION: 97 % | SYSTOLIC BLOOD PRESSURE: 122 MMHG | DIASTOLIC BLOOD PRESSURE: 64 MMHG

## 2022-04-21 DIAGNOSIS — Z91.048 ALLERGY TO MOLD: ICD-10-CM

## 2022-04-21 DIAGNOSIS — J30.1 ALLERGY TO TREES: ICD-10-CM

## 2022-04-21 DIAGNOSIS — Z51.6 ENCOUNTER FOR DESENSITIZATION TO ALLERGENS: Primary | ICD-10-CM

## 2022-04-21 PROCEDURE — 95117 IMMUNOTHERAPY INJECTIONS: CPT | Performed by: NURSE PRACTITIONER

## 2022-04-21 NOTE — PROGRESS NOTES
After consent obtained/verified, allergy injection given in back of R/L arm(s). VIAL COLOR OF ALL VIALS TODAY IS yellow    ALLERGY INJECTION FROM VIAL A GIVEN right  UPPER ARM IN THE AMOUNT OF 0.40 ML    ALLERGY INJECTION FROM VIAL B GIVEN left upper ARM IN THE AMOUNT OF 0.40 ML          Documentation of vial injection specific to arm(s) noted on Allergy Immunotherapy Administration Form. Patient waited 30 minutes for observation. No      Patient tolerated well without adverse reaction WHILE IN OFFICE    SHOT REACTION TREATMENT INSTRUCTIONS    During the 30 minute wait after an allergy injection the following symptoms should be reported:    Itching other than at the injection site  Hives or swelling other than at the injection site  Redness other than at the injection site  Difficulty breathing  Chest tightness  Difficulty swallowing  Throat tightness    If these symptoms occur, NOTIFY PROVIDER and the following treatment should be administered:    1. Epinephrine/Auvi Q 1:1000 IM - 0.3 ml if > 66 lbs or more, 0.15 ml if 33 - 63 lbs, or 0.1 ml if <33 lbs     2. Diphenhydramine - give all intramuscular:     2 to <6 years (off-label use): 6.25 mg,    6 to <12 years: 12.5 to 25 mg;    ?12 years: 25-50 mg.    3.  Famotidine:  Adults 40 mg oral    Adolescents age 12 years and >88 lbs: 40 mg    Children and Adolescents ? 12years of age: Initial: 0.25 mg/kg/dose  every 12 hours (maximum daily dose: 40 mg/day)    Epi/Auvi Q dose may me repeated in 5-15 minutes if adequate resolution of symptoms does not occur    Patient should be observed for at least one hour after final Epi/Auvi Q dose and must be seen by provider. Patients cannot drive themselves if they have received diphenhydramine.

## 2022-04-25 ENCOUNTER — NURSE ONLY (OUTPATIENT)
Dept: ALLERGY | Age: 62
End: 2022-04-25
Payer: COMMERCIAL

## 2022-04-25 VITALS
OXYGEN SATURATION: 99 % | SYSTOLIC BLOOD PRESSURE: 110 MMHG | TEMPERATURE: 97.1 F | DIASTOLIC BLOOD PRESSURE: 70 MMHG | HEART RATE: 62 BPM

## 2022-04-25 DIAGNOSIS — Z51.6 ENCOUNTER FOR DESENSITIZATION TO ALLERGENS: Primary | ICD-10-CM

## 2022-04-25 DIAGNOSIS — Z91.048 ALLERGY TO MOLD: ICD-10-CM

## 2022-04-25 DIAGNOSIS — J30.1 ALLERGY TO TREES: ICD-10-CM

## 2022-04-25 PROCEDURE — 95117 IMMUNOTHERAPY INJECTIONS: CPT | Performed by: NURSE PRACTITIONER

## 2022-05-02 ENCOUNTER — NURSE ONLY (OUTPATIENT)
Dept: ALLERGY | Age: 62
End: 2022-05-02
Payer: COMMERCIAL

## 2022-05-02 VITALS
TEMPERATURE: 97.8 F | OXYGEN SATURATION: 98 % | DIASTOLIC BLOOD PRESSURE: 74 MMHG | SYSTOLIC BLOOD PRESSURE: 116 MMHG | HEART RATE: 94 BPM | RESPIRATION RATE: 16 BRPM

## 2022-05-02 DIAGNOSIS — Z91.048 ALLERGY TO MOLD: ICD-10-CM

## 2022-05-02 DIAGNOSIS — Z51.6 ENCOUNTER FOR DESENSITIZATION TO ALLERGENS: Primary | ICD-10-CM

## 2022-05-02 DIAGNOSIS — J30.1 ALLERGY TO TREES: ICD-10-CM

## 2022-05-02 PROCEDURE — 95117 IMMUNOTHERAPY INJECTIONS: CPT | Performed by: NURSE PRACTITIONER

## 2022-05-02 NOTE — PROGRESS NOTES
After consent obtained/verified, allergy injection given in back of R/L arm(s). VIAL COLOR OF ALL VIALS TODAY IS yellow    ALLERGY INJECTION FROM VIAL A GIVEN right  UPPER ARM IN THE AMOUNT OF 0.50 ML    ALLERGY INJECTION FROM VIAL B GIVEN left upper ARM IN THE AMOUNT OF 0.50 ML        Documentation of vial injection specific to arm(s) noted on Allergy Immunotherapy Administration Form. Patient waited 30 minutes for observation. No      Patient tolerated well without adverse reaction WHILE IN OFFICE    SHOT REACTION TREATMENT INSTRUCTIONS    During the 30 minute wait after an allergy injection the following symptoms should be reported:    Itching other than at the injection site  Hives or swelling other than at the injection site  Redness other than at the injection site  Difficulty breathing  Chest tightness  Difficulty swallowing  Throat tightness    If these symptoms occur, NOTIFY PROVIDER and the following treatment should be administered:    1. Epinephrine/Auvi Q 1:1000 IM - 0.3 ml if > 66 lbs or more, 0.15 ml if 33 - 63 lbs, or 0.1 ml if <33 lbs     2. Diphenhydramine - give all intramuscular:     2 to <6 years (off-label use): 6.25 mg,    6 to <12 years: 12.5 to 25 mg;    ?12 years: 25-50 mg.    3.  Famotidine:  Adults 40 mg oral    Adolescents age 12 years and >88 lbs: 40 mg    Children and Adolescents ? 12years of age: Initial: 0.25 mg/kg/dose  every 12 hours (maximum daily dose: 40 mg/day)    Epi/Auvi Q dose may me repeated in 5-15 minutes if adequate resolution of symptoms does not occur    Patient should be observed for at least one hour after final Epi/Auvi Q dose and must be seen by provider. Patients cannot drive themselves if they have received diphenhydramine.

## 2022-05-05 ENCOUNTER — NURSE ONLY (OUTPATIENT)
Dept: ALLERGY | Age: 62
End: 2022-05-05
Payer: COMMERCIAL

## 2022-05-05 VITALS
HEART RATE: 83 BPM | TEMPERATURE: 97.4 F | OXYGEN SATURATION: 96 % | SYSTOLIC BLOOD PRESSURE: 120 MMHG | DIASTOLIC BLOOD PRESSURE: 70 MMHG | RESPIRATION RATE: 16 BRPM

## 2022-05-05 DIAGNOSIS — J30.1 ALLERGY TO TREES: ICD-10-CM

## 2022-05-05 DIAGNOSIS — Z91.048 ALLERGY TO MOLD: ICD-10-CM

## 2022-05-05 DIAGNOSIS — Z51.6 ENCOUNTER FOR DESENSITIZATION TO ALLERGENS: Primary | ICD-10-CM

## 2022-05-05 PROCEDURE — 95117 IMMUNOTHERAPY INJECTIONS: CPT | Performed by: NURSE PRACTITIONER

## 2022-05-05 NOTE — PROGRESS NOTES
After consent obtained/verified, allergy injection given in back of R/L arm(s). VIAL COLOR OF ALL VIALS TODAY IS red    ALLERGY INJECTION FROM VIAL A GIVEN left  UPPER ARM IN THE AMOUNT OF 0.05 ML    ALLERGY INJECTION FROM VIAL B GIVEN right upper ARM IN THE AMOUNT OF 0.05 ML      Documentation of vial injection specific to arm(s) noted on Allergy Immunotherapy Administration Form. Patient waited 30 minutes for observation. No      Patient tolerated well without adverse reaction WHILE IN OFFICE    SHOT REACTION TREATMENT INSTRUCTIONS    During the 30 minute wait after an allergy injection the following symptoms should be reported:    Itching other than at the injection site  Hives or swelling other than at the injection site  Redness other than at the injection site  Difficulty breathing  Chest tightness  Difficulty swallowing  Throat tightness    If these symptoms occur, NOTIFY PROVIDER and the following treatment should be administered:    1. Epinephrine/Auvi Q 1:1000 IM - 0.3 ml if > 66 lbs or more, 0.15 ml if 33 - 63 lbs, or 0.1 ml if <33 lbs     2. Diphenhydramine - give all intramuscular:     2 to <6 years (off-label use): 6.25 mg,    6 to <12 years: 12.5 to 25 mg;    ?12 years: 25-50 mg.    3.  Famotidine:  Adults 40 mg oral    Adolescents age 12 years and >88 lbs: 40 mg    Children and Adolescents ? 12years of age: Initial: 0.25 mg/kg/dose  every 12 hours (maximum daily dose: 40 mg/day)    Epi/Auvi Q dose may me repeated in 5-15 minutes if adequate resolution of symptoms does not occur    Patient should be observed for at least one hour after final Epi/Auvi Q dose and must be seen by provider. Patients cannot drive themselves if they have received diphenhydramine.

## 2022-05-12 ENCOUNTER — NURSE ONLY (OUTPATIENT)
Dept: ALLERGY | Age: 62
End: 2022-05-12
Payer: COMMERCIAL

## 2022-05-12 VITALS
RESPIRATION RATE: 16 BRPM | DIASTOLIC BLOOD PRESSURE: 74 MMHG | SYSTOLIC BLOOD PRESSURE: 118 MMHG | OXYGEN SATURATION: 94 % | HEART RATE: 80 BPM | TEMPERATURE: 97.6 F

## 2022-05-12 DIAGNOSIS — Z51.6 ENCOUNTER FOR DESENSITIZATION TO ALLERGENS: Primary | ICD-10-CM

## 2022-05-12 DIAGNOSIS — J30.1 ALLERGY TO TREES: ICD-10-CM

## 2022-05-12 DIAGNOSIS — Z91.048 ALLERGY TO MOLD: ICD-10-CM

## 2022-05-12 PROCEDURE — 95117 IMMUNOTHERAPY INJECTIONS: CPT | Performed by: NURSE PRACTITIONER

## 2022-05-12 NOTE — PROGRESS NOTES
After consent obtained/verified, allergy injection given in back of R/L arm(s). VIAL COLOR OF ALL VIALS TODAY IS red    ALLERGY INJECTION FROM VIAL A GIVEN left  UPPER ARM IN THE AMOUNT OF 0.10 ML    ALLERGY INJECTION FROM VIAL B GIVEN left upper ARM IN THE AMOUNT OF 0.10 ML      Documentation of vial injection specific to arm(s) noted on Allergy Immunotherapy Administration Form. Patient waited 30 minutes for observation. No      Patient tolerated well without adverse reaction WHILE IN OFFICE    SHOT REACTION TREATMENT INSTRUCTIONS    During the 30 minute wait after an allergy injection the following symptoms should be reported:    Itching other than at the injection site  Hives or swelling other than at the injection site  Redness other than at the injection site  Difficulty breathing  Chest tightness  Difficulty swallowing  Throat tightness    If these symptoms occur, NOTIFY PROVIDER and the following treatment should be administered:    1. Epinephrine/Auvi Q 1:1000 IM - 0.3 ml if > 66 lbs or more, 0.15 ml if 33 - 63 lbs, or 0.1 ml if <33 lbs     2. Diphenhydramine - give all intramuscular:     2 to <6 years (off-label use): 6.25 mg,    6 to <12 years: 12.5 to 25 mg;    ?12 years: 25-50 mg.    3.  Famotidine:  Adults 40 mg oral    Adolescents age 12 years and >88 lbs: 40 mg    Children and Adolescents ? 12years of age: Initial: 0.25 mg/kg/dose  every 12 hours (maximum daily dose: 40 mg/day)    Epi/Auvi Q dose may me repeated in 5-15 minutes if adequate resolution of symptoms does not occur    Patient should be observed for at least one hour after final Epi/Auvi Q dose and must be seen by provider. Patients cannot drive themselves if they have received diphenhydramine.

## 2022-05-19 ENCOUNTER — NURSE ONLY (OUTPATIENT)
Dept: ALLERGY | Age: 62
End: 2022-05-19
Payer: COMMERCIAL

## 2022-05-19 VITALS — DIASTOLIC BLOOD PRESSURE: 70 MMHG | SYSTOLIC BLOOD PRESSURE: 118 MMHG | HEART RATE: 88 BPM | TEMPERATURE: 97.3 F

## 2022-05-19 DIAGNOSIS — Z51.6 ENCOUNTER FOR DESENSITIZATION TO ALLERGENS: Primary | ICD-10-CM

## 2022-05-19 DIAGNOSIS — J30.1 ALLERGY TO TREES: ICD-10-CM

## 2022-05-19 DIAGNOSIS — Z91.048 ALLERGY TO MOLD: ICD-10-CM

## 2022-05-19 PROCEDURE — 95117 IMMUNOTHERAPY INJECTIONS: CPT | Performed by: NURSE PRACTITIONER

## 2022-05-19 NOTE — PROGRESS NOTES
After consent obtained/verified, allergy injection given in back of R/L arm(s). VIAL COLOR OF ALL VIALS TODAY IS RED    ALLERGY INJECTION FROM VIAL A GIVEN left  UPPER ARM IN THE AMOUNT OF 0.15 ML    ALLERGY INJECTION FROM VIAL B GIVEN right upper ARM IN THE AMOUNT OF 0.15 ML      Documentation of vial injection specific to arm(s) noted on Allergy Immunotherapy Administration Form. Patient waited 30 minutes for observation. No      Patient tolerated well without adverse reaction WHILE IN OFFICE    SHOT REACTION TREATMENT INSTRUCTIONS    During the 30 minute wait after an allergy injection the following symptoms should be reported:    Itching other than at the injection site  Hives or swelling other than at the injection site  Redness other than at the injection site  Difficulty breathing  Chest tightness  Difficulty swallowing  Throat tightness    If these symptoms occur, NOTIFY PROVIDER and the following treatment should be administered:    1. Epinephrine/Auvi Q 1:1000 IM - 0.3 ml if > 66 lbs or more, 0.15 ml if 33 - 63 lbs, or 0.1 ml if <33 lbs     2. Diphenhydramine - give all intramuscular:     2 to <6 years (off-label use): 6.25 mg,    6 to <12 years: 12.5 to 25 mg;    ?12 years: 25-50 mg.    3.  Famotidine:  Adults 40 mg oral    Adolescents age 12 years and >88 lbs: 40 mg    Children and Adolescents ? 12years of age: Initial: 0.25 mg/kg/dose  every 12 hours (maximum daily dose: 40 mg/day)    Epi/Auvi Q dose may me repeated in 5-15 minutes if adequate resolution of symptoms does not occur    Patient should be observed for at least one hour after final Epi/Auvi Q dose and must be seen by provider. Patients cannot drive themselves if they have received diphenhydramine.

## 2022-06-09 ENCOUNTER — NURSE ONLY (OUTPATIENT)
Dept: ALLERGY | Age: 62
End: 2022-06-09
Payer: COMMERCIAL

## 2022-06-09 VITALS
TEMPERATURE: 97.1 F | DIASTOLIC BLOOD PRESSURE: 70 MMHG | SYSTOLIC BLOOD PRESSURE: 120 MMHG | OXYGEN SATURATION: 96 % | HEART RATE: 84 BPM

## 2022-06-09 DIAGNOSIS — Z51.6 ENCOUNTER FOR DESENSITIZATION TO ALLERGENS: Primary | ICD-10-CM

## 2022-06-09 DIAGNOSIS — Z91.048 ALLERGY TO MOLD: ICD-10-CM

## 2022-06-09 DIAGNOSIS — J30.1 ALLERGY TO TREES: ICD-10-CM

## 2022-06-09 PROCEDURE — 95117 IMMUNOTHERAPY INJECTIONS: CPT | Performed by: NURSE PRACTITIONER

## 2022-06-09 NOTE — PROGRESS NOTES
After consent obtained/verified, allergy injection given in back of R/L arm(s). VIAL COLOR OF ALL VIALS TODAY IS red    ALLERGY INJECTION FROM VIAL A GIVEN right  UPPER ARM IN THE AMOUNT OF 0.15 ML    ALLERGY INJECTION FROM VIAL B GIVEN left upper ARM IN THE AMOUNT OF 0.15 ML      Documentation of vial injection specific to arm(s) noted on Allergy Immunotherapy Administration Form. Patient waited 30 minutes for observation. No      Patient tolerated well without adverse reaction WHILE IN OFFICE    SHOT REACTION TREATMENT INSTRUCTIONS    During the 30 minute wait after an allergy injection the following symptoms should be reported:    Itching other than at the injection site  Hives or swelling other than at the injection site  Redness other than at the injection site  Difficulty breathing  Chest tightness  Difficulty swallowing  Throat tightness    If these symptoms occur, NOTIFY PROVIDER and the following treatment should be administered:    1. Epinephrine/Auvi Q 1:1000 IM - 0.3 ml if > 66 lbs or more, 0.15 ml if 33 - 63 lbs, or 0.1 ml if <33 lbs     2. Diphenhydramine - give all intramuscular:     2 to <6 years (off-label use): 6.25 mg,    6 to <12 years: 12.5 to 25 mg;    ?12 years: 25-50 mg.    3.  Famotidine:  Adults 40 mg oral    Adolescents age 12 years and >88 lbs: 40 mg    Children and Adolescents ? 12years of age: Initial: 0.25 mg/kg/dose  every 12 hours (maximum daily dose: 40 mg/day)    Epi/Auvi Q dose may me repeated in 5-15 minutes if adequate resolution of symptoms does not occur    Patient should be observed for at least one hour after final Epi/Auvi Q dose and must be seen by provider. Patients cannot drive themselves if they have received diphenhydramine.

## 2022-06-16 ENCOUNTER — NURSE ONLY (OUTPATIENT)
Dept: ALLERGY | Age: 62
End: 2022-06-16
Payer: COMMERCIAL

## 2022-06-16 VITALS
DIASTOLIC BLOOD PRESSURE: 72 MMHG | TEMPERATURE: 97.1 F | RESPIRATION RATE: 16 BRPM | OXYGEN SATURATION: 97 % | HEART RATE: 82 BPM | SYSTOLIC BLOOD PRESSURE: 120 MMHG

## 2022-06-16 DIAGNOSIS — J30.1 ALLERGY TO TREES: ICD-10-CM

## 2022-06-16 DIAGNOSIS — Z51.6 ENCOUNTER FOR DESENSITIZATION TO ALLERGENS: Primary | ICD-10-CM

## 2022-06-16 DIAGNOSIS — Z91.048 ALLERGY TO MOLD: ICD-10-CM

## 2022-06-16 PROCEDURE — 95117 IMMUNOTHERAPY INJECTIONS: CPT | Performed by: NURSE PRACTITIONER

## 2022-06-16 NOTE — PROGRESS NOTES
After consent obtained/verified, allergy injection given in back of R/L arm(s). VIAL COLOR OF ALL VIALS TODAY IS red    ALLERGY INJECTION FROM VIAL A GIVEN left  UPPER ARM IN THE AMOUNT OF 0.20 ML    ALLERGY INJECTION FROM VIAL B GIVEN right upper ARM IN THE AMOUNT OF 0.20 ML          Documentation of vial injection specific to arm(s) noted on Allergy Immunotherapy Administration Form. Patient waited 30 minutes for observation. No      Patient tolerated well without adverse reaction WHILE IN OFFICE    SHOT REACTION TREATMENT INSTRUCTIONS    During the 30 minute wait after an allergy injection the following symptoms should be reported:    Itching other than at the injection site  Hives or swelling other than at the injection site  Redness other than at the injection site  Difficulty breathing  Chest tightness  Difficulty swallowing  Throat tightness    If these symptoms occur, NOTIFY PROVIDER and the following treatment should be administered:    1. Epinephrine/Auvi Q 1:1000 IM - 0.3 ml if > 66 lbs or more, 0.15 ml if 33 - 63 lbs, or 0.1 ml if <33 lbs     2. Diphenhydramine - give all intramuscular:     2 to <6 years (off-label use): 6.25 mg,    6 to <12 years: 12.5 to 25 mg;    ?12 years: 25-50 mg.    3.  Famotidine:  Adults 40 mg oral    Adolescents age 12 years and >88 lbs: 40 mg    Children and Adolescents ? 12years of age: Initial: 0.25 mg/kg/dose  every 12 hours (maximum daily dose: 40 mg/day)    Epi/Auvi Q dose may me repeated in 5-15 minutes if adequate resolution of symptoms does not occur    Patient should be observed for at least one hour after final Epi/Auvi Q dose and must be seen by provider. Patients cannot drive themselves if they have received diphenhydramine.

## 2022-06-29 DIAGNOSIS — J30.9 CHRONIC ALLERGIC RHINITIS: Primary | ICD-10-CM

## 2022-06-29 DIAGNOSIS — Z29.8 IMMUNOTHERAPY: ICD-10-CM

## 2022-06-29 PROCEDURE — 95165 ANTIGEN THERAPY SERVICES: CPT | Performed by: NURSE PRACTITIONER

## 2022-06-29 NOTE — PROGRESS NOTES
ALLERGY EXTRACT MAINTENANCE MIXING RED VIAL ONLY  Pt's vials were expiring and new serums were mixed. Provider onsite during allergy extract preparation. TOTAL VIALS=  2  TOTAL DOSES PER VIAL = 20  TOTAL ANTICIPATED DOSES PREPARED = 40       An allergy extract maintenance solution was prepared in red vial according to the maintenance prescription. The refrigerator shelf-life for each red vial is 12 months or as indicated on the label. Patient vials were labeled with name, date-of-birth, vial number, concentration, and expiration. When injecting from a new maintenance vial, the first injections should be three doses below the previous maintenance dose. This is done because the refill may be slightly stronger than the vial that was just emptied. (Example: if the maintenance dose is 0.5, start at 0.35 and proceed to 0.4, 0.45, 0.5). The three build up injections may be given weekly until the maintenance dosage is reached again. Then every other week injections may be resumed. Patients on maintenance should be seen by the allergy provider every 6-12 months and as needed. The injection record and serum is reviewed and documented at every injection appointment. When down to the last 1/3 of the bottle, a maintenance refill will be prepared (pending patient is without reactions) for next refill. Patients requesting refills that receive injections at outside medical facilities must include the patient's demographic sheet, current insurance information and the injection records. Allow 2-3 weeks for delivery after the refill has been requested. PROTOCOL FOR LATE INJECTIONS  Days late determined from the due date of the injection    Shot Frequency 5-10 Days Late 11-16 Days Late 17-30 Days Late 31-60 Days Late 61+ Days Late   Twice Weekly Repeat last dose Reduce last dose . 2 Reduce last dose . 4 Dilute back 1 vial, start at .05 Patient must start over     Weekly Repeat last dose Reduce last dose .2 Reduce last dose . 4 Dilute back 1 vial, start at .05 Patient must start over   Every 2 Weeks Repeat last dose Reduce last dose . 2. Reduce last dose . 4 Ask provider for instruction Ask provider for instruction   Every 3 Weeks Repeat last dose Reduce last dose . 2 Reduce last dose . 4 Ask provider for instruction Ask provider for instruction   Every 4 Weeks Repeat last dose Reduce last dose . 2 Reduce last dose . 4 Ask provider for instruction Ask provider for instruction

## 2022-06-30 ENCOUNTER — NURSE ONLY (OUTPATIENT)
Dept: ALLERGY | Age: 62
End: 2022-06-30
Payer: COMMERCIAL

## 2022-06-30 VITALS
TEMPERATURE: 98.1 F | DIASTOLIC BLOOD PRESSURE: 80 MMHG | HEART RATE: 78 BPM | SYSTOLIC BLOOD PRESSURE: 120 MMHG | OXYGEN SATURATION: 96 % | RESPIRATION RATE: 14 BRPM

## 2022-06-30 DIAGNOSIS — J30.1 ALLERGY TO TREES: ICD-10-CM

## 2022-06-30 DIAGNOSIS — Z51.6 ENCOUNTER FOR DESENSITIZATION TO ALLERGENS: Primary | ICD-10-CM

## 2022-06-30 DIAGNOSIS — Z91.048 ALLERGY TO MOLD: ICD-10-CM

## 2022-06-30 PROCEDURE — 95117 IMMUNOTHERAPY INJECTIONS: CPT | Performed by: NURSE PRACTITIONER

## 2022-06-30 NOTE — PROGRESS NOTES
After consent obtained/verified, allergy injection given in back of R/L arm(s). VIAL COLOR OF ALL VIALS TODAY IS red    ALLERGY INJECTION FROM VIAL A GIVEN right  UPPER ARM IN THE AMOUNT OF 0.25 ML    ALLERGY INJECTION FROM VIAL B GIVEN left upper ARM IN THE AMOUNT OF 0.25 ML          Documentation of vial injection specific to arm(s) noted on Allergy Immunotherapy Administration Form. Patient waited 30 minutes for observation. No      Patient tolerated well without adverse reaction WHILE IN OFFICE    SHOT REACTION TREATMENT INSTRUCTIONS    During the 30 minute wait after an allergy injection the following symptoms should be reported:    Itching other than at the injection site  Hives or swelling other than at the injection site  Redness other than at the injection site  Difficulty breathing  Chest tightness  Difficulty swallowing  Throat tightness    If these symptoms occur, NOTIFY PROVIDER and the following treatment should be administered:    1. Epinephrine/Auvi Q 1:1000 IM - 0.3 ml if > 66 lbs or more, 0.15 ml if 33 - 63 lbs, or 0.1 ml if <33 lbs     2. Diphenhydramine - give all intramuscular:     2 to <6 years (off-label use): 6.25 mg,    6 to <12 years: 12.5 to 25 mg;    ?12 years: 25-50 mg.    3.  Famotidine:  Adults 40 mg oral    Adolescents age 12 years and >88 lbs: 40 mg    Children and Adolescents ? 12years of age: Initial: 0.25 mg/kg/dose  every 12 hours (maximum daily dose: 40 mg/day)    Epi/Auvi Q dose may me repeated in 5-15 minutes if adequate resolution of symptoms does not occur    Patient should be observed for at least one hour after final Epi/Auvi Q dose and must be seen by provider. Patients cannot drive themselves if they have received diphenhydramine.

## 2022-07-07 ENCOUNTER — NURSE ONLY (OUTPATIENT)
Dept: ALLERGY | Age: 62
End: 2022-07-07
Payer: COMMERCIAL

## 2022-07-07 VITALS
DIASTOLIC BLOOD PRESSURE: 76 MMHG | HEART RATE: 78 BPM | SYSTOLIC BLOOD PRESSURE: 110 MMHG | RESPIRATION RATE: 14 BRPM | TEMPERATURE: 97.1 F | OXYGEN SATURATION: 98 %

## 2022-07-07 DIAGNOSIS — Z51.6 ENCOUNTER FOR DESENSITIZATION TO ALLERGENS: Primary | ICD-10-CM

## 2022-07-07 DIAGNOSIS — Z91.048 ALLERGY TO MOLD: ICD-10-CM

## 2022-07-07 DIAGNOSIS — J30.1 ALLERGY TO TREES: ICD-10-CM

## 2022-07-07 PROCEDURE — 95117 IMMUNOTHERAPY INJECTIONS: CPT | Performed by: NURSE PRACTITIONER

## 2022-07-14 ENCOUNTER — NURSE ONLY (OUTPATIENT)
Dept: ALLERGY | Age: 62
End: 2022-07-14
Payer: COMMERCIAL

## 2022-07-14 VITALS
DIASTOLIC BLOOD PRESSURE: 76 MMHG | OXYGEN SATURATION: 98 % | HEART RATE: 114 BPM | RESPIRATION RATE: 16 BRPM | SYSTOLIC BLOOD PRESSURE: 118 MMHG | TEMPERATURE: 97.3 F

## 2022-07-14 DIAGNOSIS — J30.1 ALLERGY TO TREES: ICD-10-CM

## 2022-07-14 DIAGNOSIS — Z91.048 ALLERGY TO MOLD: Primary | ICD-10-CM

## 2022-07-14 DIAGNOSIS — Z51.6 ENCOUNTER FOR DESENSITIZATION TO ALLERGENS: ICD-10-CM

## 2022-07-14 PROCEDURE — 95117 IMMUNOTHERAPY INJECTIONS: CPT | Performed by: NURSE PRACTITIONER

## 2022-07-14 NOTE — PROGRESS NOTES
After consent obtained/verified, allergy injection given in back of R/L arm(s). VIAL COLOR OF ALL VIALS TODAY IS red    ALLERGY INJECTION FROM VIAL A GIVEN right  UPPER ARM IN THE AMOUNT OF 0.35 ML    ALLERGY INJECTION FROM VIAL B GIVEN left upper ARM IN THE AMOUNT OF 0.35 ML        Documentation of vial injection specific to arm(s) noted on Allergy Immunotherapy Administration Form. Patient waited 30 minutes for observation. No      Patient tolerated well without adverse reaction WHILE IN OFFICE    SHOT REACTION TREATMENT INSTRUCTIONS    During the 30 minute wait after an allergy injection the following symptoms should be reported:    Itching other than at the injection site  Hives or swelling other than at the injection site  Redness other than at the injection site  Difficulty breathing  Chest tightness  Difficulty swallowing  Throat tightness    If these symptoms occur, NOTIFY PROVIDER and the following treatment should be administered:    1. Epinephrine/Auvi Q 1:1000 IM - 0.3 ml if > 66 lbs or more, 0.15 ml if 33 - 63 lbs, or 0.1 ml if <33 lbs     2. Diphenhydramine - give all intramuscular:     2 to <6 years (off-label use): 6.25 mg,    6 to <12 years: 12.5 to 25 mg;    ?12 years: 25-50 mg.    3.  Famotidine:  Adults 40 mg oral    Adolescents age 12 years and >88 lbs: 40 mg    Children and Adolescents ? 12years of age: Initial: 0.25 mg/kg/dose  every 12 hours (maximum daily dose: 40 mg/day)    Epi/Auvi Q dose may me repeated in 5-15 minutes if adequate resolution of symptoms does not occur    Patient should be observed for at least one hour after final Epi/Auvi Q dose and must be seen by provider. Patients cannot drive themselves if they have received diphenhydramine.

## 2022-08-18 ENCOUNTER — NURSE ONLY (OUTPATIENT)
Dept: ALLERGY | Age: 62
End: 2022-08-18
Payer: COMMERCIAL

## 2022-08-18 VITALS
TEMPERATURE: 97.1 F | HEART RATE: 72 BPM | RESPIRATION RATE: 16 BRPM | DIASTOLIC BLOOD PRESSURE: 70 MMHG | SYSTOLIC BLOOD PRESSURE: 126 MMHG

## 2022-08-18 DIAGNOSIS — Z91.048 ALLERGY TO MOLD: Primary | ICD-10-CM

## 2022-08-18 DIAGNOSIS — J30.1 ALLERGY TO TREES: ICD-10-CM

## 2022-08-18 DIAGNOSIS — Z51.6 ENCOUNTER FOR DESENSITIZATION TO ALLERGENS: ICD-10-CM

## 2022-08-18 PROCEDURE — 95117 IMMUNOTHERAPY INJECTIONS: CPT | Performed by: NURSE PRACTITIONER

## 2022-08-25 ENCOUNTER — NURSE ONLY (OUTPATIENT)
Dept: ALLERGY | Age: 62
End: 2022-08-25
Payer: COMMERCIAL

## 2022-08-25 VITALS
TEMPERATURE: 97.1 F | SYSTOLIC BLOOD PRESSURE: 114 MMHG | DIASTOLIC BLOOD PRESSURE: 70 MMHG | OXYGEN SATURATION: 99 % | HEART RATE: 88 BPM

## 2022-08-25 DIAGNOSIS — Z91.048 ALLERGY TO MOLD: ICD-10-CM

## 2022-08-25 DIAGNOSIS — Z51.6 ENCOUNTER FOR DESENSITIZATION TO ALLERGENS: Primary | ICD-10-CM

## 2022-08-25 DIAGNOSIS — J30.1 ALLERGY TO TREES: ICD-10-CM

## 2022-08-25 PROCEDURE — 95117 IMMUNOTHERAPY INJECTIONS: CPT | Performed by: NURSE PRACTITIONER

## 2022-09-01 ENCOUNTER — NURSE ONLY (OUTPATIENT)
Dept: ALLERGY | Age: 62
End: 2022-09-01
Payer: COMMERCIAL

## 2022-09-01 VITALS
RESPIRATION RATE: 16 BRPM | SYSTOLIC BLOOD PRESSURE: 116 MMHG | OXYGEN SATURATION: 98 % | DIASTOLIC BLOOD PRESSURE: 70 MMHG | TEMPERATURE: 97 F | HEART RATE: 72 BPM

## 2022-09-01 DIAGNOSIS — Z91.048 ALLERGY TO MOLD: ICD-10-CM

## 2022-09-01 DIAGNOSIS — Z51.6 ENCOUNTER FOR DESENSITIZATION TO ALLERGENS: Primary | ICD-10-CM

## 2022-09-01 DIAGNOSIS — J30.1 ALLERGY TO TREES: ICD-10-CM

## 2022-09-01 PROCEDURE — 95117 IMMUNOTHERAPY INJECTIONS: CPT | Performed by: NURSE PRACTITIONER

## 2022-09-08 ENCOUNTER — NURSE ONLY (OUTPATIENT)
Dept: ALLERGY | Age: 62
End: 2022-09-08
Payer: COMMERCIAL

## 2022-09-08 VITALS
TEMPERATURE: 97 F | HEART RATE: 65 BPM | SYSTOLIC BLOOD PRESSURE: 120 MMHG | OXYGEN SATURATION: 95 % | DIASTOLIC BLOOD PRESSURE: 70 MMHG

## 2022-09-08 DIAGNOSIS — Z91.048 ALLERGY TO MOLD: ICD-10-CM

## 2022-09-08 DIAGNOSIS — Z51.6 ENCOUNTER FOR DESENSITIZATION TO ALLERGENS: Primary | ICD-10-CM

## 2022-09-08 DIAGNOSIS — J30.1 ALLERGY TO TREES: ICD-10-CM

## 2022-09-08 PROCEDURE — 95117 IMMUNOTHERAPY INJECTIONS: CPT | Performed by: NURSE PRACTITIONER

## 2022-09-15 ENCOUNTER — NURSE ONLY (OUTPATIENT)
Dept: ALLERGY | Age: 62
End: 2022-09-15
Payer: COMMERCIAL

## 2022-09-15 VITALS
OXYGEN SATURATION: 99 % | HEART RATE: 91 BPM | SYSTOLIC BLOOD PRESSURE: 120 MMHG | TEMPERATURE: 97.1 F | DIASTOLIC BLOOD PRESSURE: 74 MMHG

## 2022-09-15 DIAGNOSIS — Z91.048 ALLERGY TO MOLD: ICD-10-CM

## 2022-09-15 DIAGNOSIS — Z51.6 ENCOUNTER FOR DESENSITIZATION TO ALLERGENS: Primary | ICD-10-CM

## 2022-09-15 DIAGNOSIS — J30.1 ALLERGY TO TREES: ICD-10-CM

## 2022-09-15 PROCEDURE — 95117 IMMUNOTHERAPY INJECTIONS: CPT | Performed by: NURSE PRACTITIONER

## 2022-09-26 ENCOUNTER — NURSE ONLY (OUTPATIENT)
Dept: ALLERGY | Age: 62
End: 2022-09-26
Payer: COMMERCIAL

## 2022-09-26 DIAGNOSIS — Z51.6 ENCOUNTER FOR DESENSITIZATION TO ALLERGENS: Primary | ICD-10-CM

## 2022-09-26 DIAGNOSIS — Z91.048 ALLERGY TO MOLD: ICD-10-CM

## 2022-09-26 DIAGNOSIS — J30.1 ALLERGY TO TREES: ICD-10-CM

## 2022-09-26 PROCEDURE — 95117 IMMUNOTHERAPY INJECTIONS: CPT | Performed by: NURSE PRACTITIONER

## 2022-09-26 NOTE — PROGRESS NOTES
After consent obtained/verified, allergy injection given in back of R/L arm(s). VIAL COLOR OF ALL VIALS TODAY IS red    ALLERGY INJECTION FROM VIAL A GIVEN right  UPPER ARM IN THE AMOUNT OF 0.45 ML    ALLERGY INJECTION FROM VIAL B GIVEN left upper ARM IN THE AMOUNT OF 0.45 ML    Documentation of vial injection specific to arm(s) noted on Allergy Immunotherapy Administration Form. Patient waited 30 minutes for observation. No      Patient tolerated well without adverse reaction WHILE IN OFFICE    SHOT REACTION TREATMENT INSTRUCTIONS    During the 30 minute wait after an allergy injection the following symptoms should be reported:    Itching other than at the injection site  Hives or swelling other than at the injection site  Redness other than at the injection site  Difficulty breathing  Chest tightness  Difficulty swallowing  Throat tightness    If these symptoms occur, NOTIFY PROVIDER and the following treatment should be administered:    1. Epinephrine/Auvi Q 1:1000 IM - 0.3 ml if > 66 lbs or more, 0.15 ml if 33 - 63 lbs, or 0.1 ml if <33 lbs     2. Diphenhydramine - give all intramuscular:     2 to <6 years (off-label use): 6.25 mg,    6 to <12 years: 12.5 to 25 mg;    ?12 years: 25-50 mg.    3.  Famotidine:  Adults 40 mg oral    Adolescents age 12 years and >88 lbs: 40 mg    Children and Adolescents ? 12years of age: Initial: 0.25 mg/kg/dose  every 12 hours (maximum daily dose: 40 mg/day)    Epi/Auvi Q dose may me repeated in 5-15 minutes if adequate resolution of symptoms does not occur    Patient should be observed for at least one hour after final Epi/Auvi Q dose and must be seen by provider. Patients cannot drive themselves if they have received diphenhydramine.

## 2022-10-05 ENCOUNTER — NURSE ONLY (OUTPATIENT)
Dept: ALLERGY | Age: 62
End: 2022-10-05
Payer: COMMERCIAL

## 2022-10-05 VITALS
DIASTOLIC BLOOD PRESSURE: 70 MMHG | RESPIRATION RATE: 16 BRPM | OXYGEN SATURATION: 98 % | TEMPERATURE: 97.1 F | SYSTOLIC BLOOD PRESSURE: 120 MMHG | HEART RATE: 78 BPM

## 2022-10-05 DIAGNOSIS — Z51.6 ENCOUNTER FOR DESENSITIZATION TO ALLERGENS: Primary | ICD-10-CM

## 2022-10-05 DIAGNOSIS — Z91.048 ALLERGY TO MOLD: ICD-10-CM

## 2022-10-05 DIAGNOSIS — J30.1 ALLERGY TO TREES: ICD-10-CM

## 2022-10-05 PROCEDURE — 95117 IMMUNOTHERAPY INJECTIONS: CPT | Performed by: NURSE PRACTITIONER

## 2022-10-05 NOTE — PROGRESS NOTES
After consent obtained/verified, allergy injection given in back of R/L arm(s). VIAL COLOR OF ALL VIALS TODAY IS red    ALLERGY INJECTION FROM VIAL A GIVEN left  UPPER ARM IN THE AMOUNT OF 0.50 ML    ALLERGY INJECTION FROM VIAL B GIVEN right upper ARM IN THE AMOUNT OF 0.50 ML        Documentation of vial injection specific to arm(s) noted on Allergy Immunotherapy Administration Form. Patient waited 30 minutes for observation. No      Patient tolerated well without adverse reaction WHILE IN OFFICE    SHOT REACTION TREATMENT INSTRUCTIONS    During the 30 minute wait after an allergy injection the following symptoms should be reported:    Itching other than at the injection site  Hives or swelling other than at the injection site  Redness other than at the injection site  Difficulty breathing  Chest tightness  Difficulty swallowing  Throat tightness    If these symptoms occur, NOTIFY PROVIDER and the following treatment should be administered:    1. Epinephrine/Auvi Q 1:1000 IM - 0.3 ml if > 66 lbs or more, 0.15 ml if 33 - 63 lbs, or 0.1 ml if <33 lbs     2. Diphenhydramine - give all intramuscular:     2 to <6 years (off-label use): 6.25 mg,    6 to <12 years: 12.5 to 25 mg;    ?12 years: 25-50 mg.    3.  Famotidine:  Adults 40 mg oral    Adolescents age 12 years and >88 lbs: 40 mg    Children and Adolescents ? 12years of age: Initial: 0.25 mg/kg/dose  every 12 hours (maximum daily dose: 40 mg/day)    Epi/Auvi Q dose may me repeated in 5-15 minutes if adequate resolution of symptoms does not occur    Patient should be observed for at least one hour after final Epi/Auvi Q dose and must be seen by provider. Patients cannot drive themselves if they have received diphenhydramine.

## 2022-10-13 ENCOUNTER — NURSE ONLY (OUTPATIENT)
Dept: ALLERGY | Age: 62
End: 2022-10-13
Payer: COMMERCIAL

## 2022-10-13 VITALS
SYSTOLIC BLOOD PRESSURE: 128 MMHG | DIASTOLIC BLOOD PRESSURE: 70 MMHG | TEMPERATURE: 97.6 F | HEART RATE: 81 BPM | OXYGEN SATURATION: 97 %

## 2022-10-13 DIAGNOSIS — Z91.048 ALLERGY TO MOLD: ICD-10-CM

## 2022-10-13 DIAGNOSIS — Z51.6 ENCOUNTER FOR DESENSITIZATION TO ALLERGENS: Primary | ICD-10-CM

## 2022-10-13 DIAGNOSIS — J30.1 ALLERGY TO TREES: ICD-10-CM

## 2022-10-13 PROCEDURE — 95117 IMMUNOTHERAPY INJECTIONS: CPT | Performed by: NURSE PRACTITIONER

## 2022-10-13 NOTE — PROGRESS NOTES
After consent obtained/verified, allergy injection given in back of R/L arm(s). VIAL COLOR OF ALL VIALS TODAY IS RED    ALLERGY INJECTION FROM VIAL A GIVEN right  UPPER ARM IN THE AMOUNT OF 0.50 ML    ALLERGY INJECTION FROM VIAL B GIVEN left upper ARM IN THE AMOUNT OF 0.50 ML        Documentation of vial injection specific to arm(s) noted on Allergy Immunotherapy Administration Form. Patient waited 30 minutes for observation. No          SHOT REACTION TREATMENT INSTRUCTIONS    During the 30 minute wait after an allergy injection the following symptoms should be reported:    Itching other than at the injection site  Hives or swelling other than at the injection site  Redness other than at the injection site  Difficulty breathing  Chest tightness  Difficulty swallowing  Throat tightness    If these symptoms occur, NOTIFY PROVIDER and the following treatment should be administered:    1. Epinephrine/Auvi Q 1:1000 IM - 0.3 ml if > 66 lbs or more, 0.15 ml if 33 - 63 lbs, or 0.1 ml if <33 lbs     2. Diphenhydramine - give all intramuscular:     2 to <6 years (off-label use): 6.25 mg,    6 to <12 years: 12.5 to 25 mg;    ?12 years: 25-50 mg.    3.  Famotidine:  Adults 40 mg oral    Adolescents age 12 years and >88 lbs: 40 mg    Children and Adolescents ? 12years of age: Initial: 0.25 mg/kg/dose  every 12 hours (maximum daily dose: 40 mg/day)    Epi/Auvi Q dose may me repeated in 5-15 minutes if adequate resolution of symptoms does not occur    Patient should be observed for at least one hour after final Epi/Auvi Q dose and must be seen by provider. Patients cannot drive themselves if they have received diphenhydramine.

## 2022-10-17 ENCOUNTER — NURSE ONLY (OUTPATIENT)
Dept: ALLERGY | Age: 62
End: 2022-10-17
Payer: COMMERCIAL

## 2022-10-17 VITALS
TEMPERATURE: 97.3 F | DIASTOLIC BLOOD PRESSURE: 70 MMHG | SYSTOLIC BLOOD PRESSURE: 120 MMHG | OXYGEN SATURATION: 98 % | RESPIRATION RATE: 16 BRPM | HEART RATE: 78 BPM

## 2022-10-17 DIAGNOSIS — J30.1 ALLERGY TO TREES: ICD-10-CM

## 2022-10-17 DIAGNOSIS — Z91.048 ALLERGY TO MOLD: ICD-10-CM

## 2022-10-17 DIAGNOSIS — Z51.6 ENCOUNTER FOR DESENSITIZATION TO ALLERGENS: Primary | ICD-10-CM

## 2022-10-17 PROCEDURE — 95117 IMMUNOTHERAPY INJECTIONS: CPT | Performed by: NURSE PRACTITIONER

## 2022-11-03 ENCOUNTER — NURSE ONLY (OUTPATIENT)
Dept: ALLERGY | Age: 62
End: 2022-11-03
Payer: COMMERCIAL

## 2022-11-03 VITALS
RESPIRATION RATE: 18 BRPM | OXYGEN SATURATION: 98 % | DIASTOLIC BLOOD PRESSURE: 72 MMHG | HEART RATE: 78 BPM | TEMPERATURE: 97.3 F | SYSTOLIC BLOOD PRESSURE: 122 MMHG

## 2022-11-03 DIAGNOSIS — J30.1 ALLERGY TO TREES: ICD-10-CM

## 2022-11-03 DIAGNOSIS — Z51.6 ENCOUNTER FOR DESENSITIZATION TO ALLERGENS: Primary | ICD-10-CM

## 2022-11-03 DIAGNOSIS — Z91.048 ALLERGY TO MOLD: ICD-10-CM

## 2022-11-03 PROCEDURE — 95117 IMMUNOTHERAPY INJECTIONS: CPT | Performed by: NURSE PRACTITIONER

## 2022-11-03 RX ORDER — ESTRADIOL 0.1 MG/G
CREAM VAGINAL
COMMUNITY
Start: 2022-10-26

## 2022-11-17 ENCOUNTER — NURSE ONLY (OUTPATIENT)
Dept: ALLERGY | Age: 62
End: 2022-11-17
Payer: COMMERCIAL

## 2022-11-17 VITALS
OXYGEN SATURATION: 95 % | SYSTOLIC BLOOD PRESSURE: 120 MMHG | DIASTOLIC BLOOD PRESSURE: 70 MMHG | RESPIRATION RATE: 16 BRPM | TEMPERATURE: 97.3 F | HEART RATE: 78 BPM

## 2022-11-17 DIAGNOSIS — Z91.048 ALLERGY TO MOLD: ICD-10-CM

## 2022-11-17 DIAGNOSIS — Z51.6 ENCOUNTER FOR DESENSITIZATION TO ALLERGENS: Primary | ICD-10-CM

## 2022-11-17 DIAGNOSIS — J30.1 ALLERGY TO TREES: ICD-10-CM

## 2022-11-17 PROCEDURE — 95117 IMMUNOTHERAPY INJECTIONS: CPT | Performed by: NURSE PRACTITIONER

## 2022-11-17 RX ORDER — OFLOXACIN 3 MG/ML
SOLUTION/ DROPS OPHTHALMIC
COMMUNITY
Start: 2022-11-07

## 2022-12-01 ENCOUNTER — NURSE ONLY (OUTPATIENT)
Dept: ALLERGY | Age: 62
End: 2022-12-01
Payer: COMMERCIAL

## 2022-12-01 VITALS
HEART RATE: 96 BPM | TEMPERATURE: 97.3 F | RESPIRATION RATE: 16 BRPM | OXYGEN SATURATION: 97 % | SYSTOLIC BLOOD PRESSURE: 122 MMHG | DIASTOLIC BLOOD PRESSURE: 78 MMHG

## 2022-12-01 DIAGNOSIS — Z51.6 ENCOUNTER FOR DESENSITIZATION TO ALLERGENS: Primary | ICD-10-CM

## 2022-12-01 DIAGNOSIS — J30.1 ALLERGY TO TREES: ICD-10-CM

## 2022-12-01 DIAGNOSIS — Z91.048 ALLERGY TO MOLD: ICD-10-CM

## 2022-12-01 PROCEDURE — 95117 IMMUNOTHERAPY INJECTIONS: CPT | Performed by: NURSE PRACTITIONER

## 2022-12-01 RX ORDER — ONDANSETRON 4 MG/1
TABLET, FILM COATED ORAL
COMMUNITY
Start: 2022-11-22

## 2022-12-01 NOTE — PROGRESS NOTES
After consent obtained/verified, allergy injection given in back of R/L arm(s). VIAL COLOR OF ALL VIALS TODAY IS red    ALLERGY INJECTION FROM VIAL A GIVEN right  UPPER ARM IN THE AMOUNT OF 0.50 ML    ALLERGY INJECTION FROM VIAL B GIVEN left upper ARM IN THE AMOUNT OF 0.50 ML          Documentation of vial injection specific to arm(s) noted on Allergy Immunotherapy Administration Form. Patient waited 30 minutes for observation. No      Patient tolerated well without adverse reaction WHILE IN OFFICE    SHOT REACTION TREATMENT INSTRUCTIONS    During the 30 minute wait after an allergy injection the following symptoms should be reported:    Itching other than at the injection site  Hives or swelling other than at the injection site  Redness other than at the injection site  Difficulty breathing  Chest tightness  Difficulty swallowing  Throat tightness    If these symptoms occur, NOTIFY PROVIDER and the following treatment should be administered:    1. Epinephrine/Auvi Q 1:1000 IM - 0.3 ml if > 66 lbs or more, 0.15 ml if 33 - 63 lbs, or 0.1 ml if <33 lbs     2. Diphenhydramine - give all intramuscular:     2 to <6 years (off-label use): 6.25 mg,    6 to <12 years: 12.5 to 25 mg;    ?12 years: 25-50 mg.    3.  Famotidine:  Adults 40 mg oral    Adolescents age 12 years and >88 lbs: 40 mg    Children and Adolescents ? 12years of age: Initial: 0.25 mg/kg/dose  every 12 hours (maximum daily dose: 40 mg/day)    Epi/Auvi Q dose may me repeated in 5-15 minutes if adequate resolution of symptoms does not occur    Patient should be observed for at least one hour after final Epi/Auvi Q dose and must be seen by provider. Patients cannot drive themselves if they have received diphenhydramine.

## 2022-12-15 ENCOUNTER — NURSE ONLY (OUTPATIENT)
Dept: ALLERGY | Age: 62
End: 2022-12-15
Payer: COMMERCIAL

## 2022-12-15 VITALS
HEART RATE: 86 BPM | DIASTOLIC BLOOD PRESSURE: 76 MMHG | RESPIRATION RATE: 16 BRPM | SYSTOLIC BLOOD PRESSURE: 120 MMHG | TEMPERATURE: 96.9 F | OXYGEN SATURATION: 98 %

## 2022-12-15 DIAGNOSIS — J30.1 ALLERGY TO TREES: ICD-10-CM

## 2022-12-15 DIAGNOSIS — Z91.048 ALLERGY TO MOLD: ICD-10-CM

## 2022-12-15 DIAGNOSIS — Z51.6 ENCOUNTER FOR DESENSITIZATION TO ALLERGENS: Primary | ICD-10-CM

## 2022-12-15 PROCEDURE — 95117 IMMUNOTHERAPY INJECTIONS: CPT | Performed by: NURSE PRACTITIONER

## 2023-01-26 ENCOUNTER — NURSE ONLY (OUTPATIENT)
Dept: ALLERGY | Age: 63
End: 2023-01-26
Payer: COMMERCIAL

## 2023-01-26 VITALS
SYSTOLIC BLOOD PRESSURE: 120 MMHG | DIASTOLIC BLOOD PRESSURE: 78 MMHG | HEART RATE: 69 BPM | OXYGEN SATURATION: 96 % | TEMPERATURE: 97.1 F

## 2023-01-26 DIAGNOSIS — Z91.048 ALLERGY TO MOLD: ICD-10-CM

## 2023-01-26 DIAGNOSIS — J30.1 ALLERGY TO TREES: ICD-10-CM

## 2023-01-26 DIAGNOSIS — Z51.6 ENCOUNTER FOR DESENSITIZATION TO ALLERGENS: Primary | ICD-10-CM

## 2023-01-26 PROCEDURE — 95117 IMMUNOTHERAPY INJECTIONS: CPT | Performed by: NURSE PRACTITIONER

## 2023-01-26 NOTE — PROGRESS NOTES
After consent obtained/verified, allergy injection given in back of R/L arm(s). VIAL COLOR OF ALL VIALS TODAY IS red    ALLERGY INJECTION FROM VIAL A GIVEN right  UPPER ARM IN THE AMOUNT OF 0.05 ML    ALLERGY INJECTION FROM VIAL B GIVEN left upper ARM IN THE AMOUNT OF 0.05 ML          Documentation of vial injection specific to arm(s) noted on Allergy Immunotherapy Administration Form. Patient waited 30 minutes for observation. No      Patient tolerated well without adverse reaction WHILE IN OFFICE    SHOT REACTION TREATMENT INSTRUCTIONS    During the 30 minute wait after an allergy injection the following symptoms should be reported:    Itching other than at the injection site  Hives or swelling other than at the injection site  Redness other than at the injection site  Difficulty breathing  Chest tightness  Difficulty swallowing  Throat tightness    If these symptoms occur, NOTIFY PROVIDER and the following treatment should be administered:    1. Epinephrine/Auvi Q 1:1000 IM - 0.3 ml if > 66 lbs or more, 0.15 ml if 33 - 63 lbs, or 0.1 ml if <33 lbs     2. Diphenhydramine - give all intramuscular:     2 to <6 years (off-label use): 6.25 mg,    6 to <12 years: 12.5 to 25 mg;    ?12 years: 25-50 mg.    3.  Famotidine:  Adults 40 mg oral    Adolescents age 12 years and >88 lbs: 40 mg    Children and Adolescents ? 12years of age: Initial: 0.25 mg/kg/dose  every 12 hours (maximum daily dose: 40 mg/day)    Epi/Auvi Q dose may me repeated in 5-15 minutes if adequate resolution of symptoms does not occur    Patient should be observed for at least one hour after final Epi/Auvi Q dose and must be seen by provider. Patients cannot drive themselves if they have received diphenhydramine.

## 2023-02-02 ENCOUNTER — NURSE ONLY (OUTPATIENT)
Dept: ALLERGY | Age: 63
End: 2023-02-02
Payer: COMMERCIAL

## 2023-02-02 VITALS
TEMPERATURE: 97 F | HEART RATE: 80 BPM | OXYGEN SATURATION: 95 % | SYSTOLIC BLOOD PRESSURE: 118 MMHG | DIASTOLIC BLOOD PRESSURE: 78 MMHG | RESPIRATION RATE: 16 BRPM

## 2023-02-02 DIAGNOSIS — Z51.6 ENCOUNTER FOR DESENSITIZATION TO ALLERGENS: Primary | ICD-10-CM

## 2023-02-02 DIAGNOSIS — J30.1 ALLERGY TO TREES: ICD-10-CM

## 2023-02-02 DIAGNOSIS — Z91.048 ALLERGY TO MOLD: ICD-10-CM

## 2023-02-02 PROCEDURE — 95117 IMMUNOTHERAPY INJECTIONS: CPT | Performed by: NURSE PRACTITIONER

## 2023-02-02 RX ORDER — LEVOTHYROXINE SODIUM 0.07 MG/1
TABLET ORAL
COMMUNITY
End: 2023-02-02

## 2023-02-02 NOTE — PROGRESS NOTES
After consent obtained/verified, allergy injection given in back of R/L arm(s). VIAL COLOR OF ALL VIALS TODAY IS red    ALLERGY INJECTION FROM VIAL A GIVEN left  UPPER ARM IN THE AMOUNT OF 0.10 ML    ALLERGY INJECTION FROM VIAL B GIVEN right upper ARM IN THE AMOUNT OF 0.10 ML          Documentation of vial injection specific to arm(s) noted on Allergy Immunotherapy Administration Form. Patient waited 30 minutes for observation. No      Patient tolerated well without adverse reaction WHILE IN OFFICE    SHOT REACTION TREATMENT INSTRUCTIONS    During the 30 minute wait after an allergy injection the following symptoms should be reported:    Itching other than at the injection site  Hives or swelling other than at the injection site  Redness other than at the injection site  Difficulty breathing  Chest tightness  Difficulty swallowing  Throat tightness    If these symptoms occur, NOTIFY PROVIDER and the following treatment should be administered:    1. Epinephrine/Auvi Q 1:1000 IM - 0.3 ml if > 66 lbs or more, 0.15 ml if 33 - 63 lbs, or 0.1 ml if <33 lbs     2. Diphenhydramine - give all intramuscular:     2 to <6 years (off-label use): 6.25 mg,    6 to <12 years: 12.5 to 25 mg;    ?12 years: 25-50 mg.    3.  Famotidine:  Adults 40 mg oral    Adolescents age 12 years and >88 lbs: 40 mg    Children and Adolescents ? 12years of age: Initial: 0.25 mg/kg/dose  every 12 hours (maximum daily dose: 40 mg/day)    Epi/Auvi Q dose may me repeated in 5-15 minutes if adequate resolution of symptoms does not occur    Patient should be observed for at least one hour after final Epi/Auvi Q dose and must be seen by provider. Patients cannot drive themselves if they have received diphenhydramine.

## 2023-02-09 ENCOUNTER — NURSE ONLY (OUTPATIENT)
Dept: ALLERGY | Age: 63
End: 2023-02-09
Payer: COMMERCIAL

## 2023-02-09 VITALS
OXYGEN SATURATION: 98 % | TEMPERATURE: 96.8 F | HEART RATE: 84 BPM | SYSTOLIC BLOOD PRESSURE: 110 MMHG | RESPIRATION RATE: 16 BRPM | DIASTOLIC BLOOD PRESSURE: 68 MMHG

## 2023-02-09 DIAGNOSIS — Z91.048 ALLERGY TO MOLD: ICD-10-CM

## 2023-02-09 DIAGNOSIS — J30.1 ALLERGY TO TREES: ICD-10-CM

## 2023-02-09 DIAGNOSIS — Z51.6 ENCOUNTER FOR DESENSITIZATION TO ALLERGENS: Primary | ICD-10-CM

## 2023-02-09 PROCEDURE — 95117 IMMUNOTHERAPY INJECTIONS: CPT | Performed by: NURSE PRACTITIONER

## 2023-02-15 ENCOUNTER — NURSE ONLY (OUTPATIENT)
Dept: ALLERGY | Age: 63
End: 2023-02-15
Payer: COMMERCIAL

## 2023-02-15 VITALS
SYSTOLIC BLOOD PRESSURE: 120 MMHG | DIASTOLIC BLOOD PRESSURE: 80 MMHG | TEMPERATURE: 97.3 F | RESPIRATION RATE: 16 BRPM | HEART RATE: 96 BPM | OXYGEN SATURATION: 98 %

## 2023-02-15 DIAGNOSIS — Z91.048 ALLERGY TO MOLD: ICD-10-CM

## 2023-02-15 DIAGNOSIS — J30.1 ALLERGY TO TREES: ICD-10-CM

## 2023-02-15 DIAGNOSIS — Z51.6 ENCOUNTER FOR DESENSITIZATION TO ALLERGENS: Primary | ICD-10-CM

## 2023-02-15 DIAGNOSIS — J30.1 NON-SEASONAL ALLERGIC RHINITIS DUE TO POLLEN: ICD-10-CM

## 2023-02-15 PROCEDURE — 95117 IMMUNOTHERAPY INJECTIONS: CPT | Performed by: NURSE PRACTITIONER

## 2023-02-15 RX ORDER — MONTELUKAST SODIUM 10 MG/1
10 TABLET ORAL NIGHTLY
Qty: 90 TABLET | Refills: 0 | Status: SHIPPED | OUTPATIENT
Start: 2023-02-15

## 2023-03-06 ENCOUNTER — NURSE ONLY (OUTPATIENT)
Dept: ALLERGY | Age: 63
End: 2023-03-06
Payer: COMMERCIAL

## 2023-03-06 ENCOUNTER — OFFICE VISIT (OUTPATIENT)
Dept: ALLERGY | Age: 63
End: 2023-03-06
Payer: COMMERCIAL

## 2023-03-06 VITALS
BODY MASS INDEX: 27.89 KG/M2 | TEMPERATURE: 98.1 F | SYSTOLIC BLOOD PRESSURE: 120 MMHG | DIASTOLIC BLOOD PRESSURE: 78 MMHG | HEART RATE: 72 BPM | WEIGHT: 167.6 LBS | RESPIRATION RATE: 18 BRPM

## 2023-03-06 VITALS
DIASTOLIC BLOOD PRESSURE: 78 MMHG | SYSTOLIC BLOOD PRESSURE: 120 MMHG | TEMPERATURE: 98.1 F | RESPIRATION RATE: 18 BRPM | HEART RATE: 72 BPM

## 2023-03-06 DIAGNOSIS — J30.1 ALLERGY TO TREES: Primary | ICD-10-CM

## 2023-03-06 DIAGNOSIS — Z77.120 EXPOSURE TO MOLD: ICD-10-CM

## 2023-03-06 DIAGNOSIS — J30.89 NON-SEASONAL ALLERGIC RHINITIS DUE TO FUNGAL SPORES: ICD-10-CM

## 2023-03-06 DIAGNOSIS — Z51.6 ENCOUNTER FOR DESENSITIZATION TO POLLEN ALLERGEN: ICD-10-CM

## 2023-03-06 DIAGNOSIS — J30.1 NON-SEASONAL ALLERGIC RHINITIS DUE TO POLLEN: ICD-10-CM

## 2023-03-06 DIAGNOSIS — Z29.8 PROPHYLACTIC IMMUNOTHERAPY: ICD-10-CM

## 2023-03-06 DIAGNOSIS — Z91.048 ALLERGY TO MOLD: ICD-10-CM

## 2023-03-06 DIAGNOSIS — J30.1 ACUTE SEASONAL ALLERGIC RHINITIS DUE TO POLLEN: ICD-10-CM

## 2023-03-06 DIAGNOSIS — Z91.09 ENCOUNTER FOR DESENSITIZATION TO POLLEN ALLERGEN: ICD-10-CM

## 2023-03-06 DIAGNOSIS — J30.1 CHRONIC ALLERGIC RHINITIS DUE TO POLLEN: ICD-10-CM

## 2023-03-06 DIAGNOSIS — Z51.6 ENCOUNTER FOR DESENSITIZATION TO POLLEN ALLERGEN: Primary | ICD-10-CM

## 2023-03-06 DIAGNOSIS — Z91.09 ENCOUNTER FOR DESENSITIZATION TO POLLEN ALLERGEN: Primary | ICD-10-CM

## 2023-03-06 DIAGNOSIS — J30.1 ALLERGY TO TREES: ICD-10-CM

## 2023-03-06 DIAGNOSIS — Z91.09 MITE ALLERGY: ICD-10-CM

## 2023-03-06 DIAGNOSIS — J30.89 SEASONAL ALLERGIC RHINITIS DUE TO FUNGAL SPORES: ICD-10-CM

## 2023-03-06 PROCEDURE — G8419 CALC BMI OUT NRM PARAM NOF/U: HCPCS | Performed by: NURSE PRACTITIONER

## 2023-03-06 PROCEDURE — 95117 IMMUNOTHERAPY INJECTIONS: CPT | Performed by: NURSE PRACTITIONER

## 2023-03-06 PROCEDURE — 3017F COLORECTAL CA SCREEN DOC REV: CPT | Performed by: NURSE PRACTITIONER

## 2023-03-06 PROCEDURE — G8484 FLU IMMUNIZE NO ADMIN: HCPCS | Performed by: NURSE PRACTITIONER

## 2023-03-06 PROCEDURE — G8427 DOCREV CUR MEDS BY ELIG CLIN: HCPCS | Performed by: NURSE PRACTITIONER

## 2023-03-06 PROCEDURE — 1036F TOBACCO NON-USER: CPT | Performed by: NURSE PRACTITIONER

## 2023-03-06 PROCEDURE — 99213 OFFICE O/P EST LOW 20 MIN: CPT | Performed by: NURSE PRACTITIONER

## 2023-03-06 RX ORDER — HYDROCODONE BITARTRATE AND ACETAMINOPHEN 10; 325 MG/1; MG/1
TABLET ORAL
COMMUNITY
Start: 2023-03-03

## 2023-03-06 RX ORDER — MONTELUKAST SODIUM 10 MG/1
10 TABLET ORAL NIGHTLY
Qty: 90 TABLET | Refills: 3 | Status: SHIPPED | OUTPATIENT
Start: 2023-03-06

## 2023-03-06 RX ORDER — CETIRIZINE HYDROCHLORIDE 10 MG/1
10 TABLET ORAL DAILY
Qty: 90 TABLET | Refills: 3 | Status: SHIPPED | OUTPATIENT
Start: 2023-03-06

## 2023-03-06 ASSESSMENT — ENCOUNTER SYMPTOMS
COLOR CHANGE: 0
CHEST TIGHTNESS: 0
DIARRHEA: 0
RHINORRHEA: 0
VOICE CHANGE: 0
WHEEZING: 0
STRIDOR: 0
SORE THROAT: 0
NAUSEA: 0
SHORTNESS OF BREATH: 0
TROUBLE SWALLOWING: 0
ABDOMINAL PAIN: 0
APNEA: 0
CHOKING: 0
SINUS PRESSURE: 0
FACIAL SWELLING: 0
VOMITING: 0
COUGH: 0

## 2023-03-06 NOTE — PROGRESS NOTES
[email or web address]@    Allergy & Asthma   [address] [facility], [address]  Ph:   [phone]  Fax:[phone]    Provider:  Dr. Orta Mis:   Chief Complaint   Patient presents with    Follow-up     PT IS HERE FOR F/U FOR  ALLERGY MANAGEMENT           HISTORY OF PRESENT ILLNESS: ESTABLISHED PATIENT HERE FOR EVALUATION   Smiley Edwards is a 58 y.o. female is here for immunotherapy follow-up. Patient has been in red color vial  since month(s). Patient currently receiving allergy injections every  1 WEEK. Immunotherapy treatment has not been changed since starting date. There have been no local reactions to immunotherapy injections. There have been no systemic reactions to IT. On immunotherapy, patient has had a 95%  reduction in symptoms. Since beginning immunotherapy no new allergy symptoms have developed. She does suspect any new allergen sensitization. During the last year patient has not started Beta-blocker medication. She would like to continue immunotherapy. Patient has found immunotherapy to be very beneficial in controlling symptoms and would like to continue. Patient is continuing cetirizine while on allergy shots. Review of Systems:  Review of Systems   Constitutional:  Negative for activity change, appetite change, chills, diaphoresis, fatigue, fever and unexpected weight change. HENT:  Negative for congestion, dental problem, ear discharge, ear pain, facial swelling, hearing loss, mouth sores, nosebleeds, postnasal drip, rhinorrhea, sinus pressure, sneezing, sore throat, tinnitus, trouble swallowing and voice change. Eyes:  Negative for visual disturbance. Respiratory:  Negative for apnea, cough, choking, chest tightness, shortness of breath, wheezing and stridor. Cardiovascular:  Negative for chest pain, palpitations and leg swelling. Gastrointestinal:  Negative for abdominal pain, diarrhea, nausea and vomiting.    Endocrine: Negative for cold intolerance, heat intolerance, polydipsia and polyuria. Genitourinary:  Negative for difficulty urinating, dysuria, enuresis, hematuria and urgency. Musculoskeletal:  Negative for arthralgias, gait problem, neck pain and neck stiffness. Skin:  Negative for color change and rash. Allergic/Immunologic: Positive for environmental allergies and food allergies. Negative for immunocompromised state. Neurological:  Negative for dizziness, syncope, facial asymmetry, speech difficulty, light-headedness and headaches. Hematological:  Negative for adenopathy. Does not bruise/bleed easily. Psychiatric/Behavioral:  Negative for confusion and sleep disturbance. The patient is not nervous/anxious. All other systems reviewed and are negative.       Past MedicalHistory:    Past Medical History:   Diagnosis Date    Allergic rhinitis     Anxiety     Arthritis     Bursitis     DJD (degenerative joint disease)     Nausea & vomiting     Pancreatitis     Spinal stenosis     Unspecified sleep apnea        Past Surgical History:  Past Surgical History:   Procedure Laterality Date    ATRIAL ABLATION SURGERY  2009    BACK SURGERY  2000    t5 t6 plate screw in neck    BACK SURGERY  01/2019    BREAST SURGERY Left 1987    lumpectomy left,  marker in right breast    CERVICAL SPINE SURGERY  8-14-12    removal of c4-7 plate and c 3 and 4 ACDF    CHOLECYSTECTOMY, OPEN  01/29/2014    attempted laparoscopic, lysis of adhesions    COLONOSCOPY  12/13     Dr. Tyesha Mckeon  2005    KNEE ARTHROSCOPY Left 2011    X2    TONSILLECTOMY  age 2    UPPER GASTROINTESTINAL ENDOSCOPY  12/24/13    Dr. Emile Blankenship        Family History:   Family History   Problem Relation Age of Onset    Cancer Mother         colon    Stroke Mother     Cancer Father         lung    Heart Disease Father     Cancer Sister         breast    Cancer Brother         bladder    Heart Disease Brother High Blood Pressure Brother     High Cholesterol Brother     Cancer Other         Rivera sarcoma    Cancer Sister         lung       Social History:   Social History     Tobacco Use    Smoking status: Former     Packs/day: 0.75     Years: 15.00     Pack years: 11.25     Types: Cigarettes     Quit date: 1997     Years since quittin.1    Smokeless tobacco: Never   Substance Use Topics    Alcohol use: Yes     Comment: OCCASSIONALLY        Allergies:  Patient has no known allergies. CurrentMedications:     Current Outpatient Medications:     HYDROcodone-acetaminophen (NORCO)  MG per tablet, , Disp: , Rfl:     cetirizine (ZYRTEC ALLERGY) 10 MG tablet, Take 1 tablet by mouth daily, Disp: 90 tablet, Rfl: 3    montelukast (SINGULAIR) 10 MG tablet, Take 1 tablet by mouth nightly, Disp: 90 tablet, Rfl: 3    ondansetron (ZOFRAN) 4 MG tablet, take 1 tablet by mouth four times a day if needed, Disp: , Rfl:     estradiol (ESTRACE) 0.1 MG/GM vaginal cream, apply vaginally once daily as directed FOR 2 WEEKS THEN APPLY 3 TIMES PER WEEK, Disp: , Rfl:     ALLERGEN EXTRACT, once, Disp: , Rfl:     celecoxib (CELEBREX) 100 MG capsule, Take 100 mg by mouth 2 times daily, Disp: , Rfl:     EPINEPHrine (AUVI-Q) 0.3 MG/0.3ML SOAJ injection, Dispense 2 packs of 2 (total 4 devices). Use as directed, STAT for allergic reaction. , Disp: 2 each, Rfl: 0    triamcinolone (NASACORT) 55 MCG/ACT nasal inhaler, 1 spray by Each Nostril route 2 times daily, Disp: 1 Inhaler, Rfl: 5    TRINTELLIX 10 MG TABS tablet, , Disp: , Rfl:     buPROPion (WELLBUTRIN XL) 300 MG extended release tablet, TAKE 1 TABLET EVERY MORNING, Disp: 90 tablet, Rfl: 1    cyclobenzaprine (FLEXERIL) 10 MG tablet, Take 1 tablet by mouth 2 times daily as needed (muscle spasm), Disp: 180 tablet, Rfl: 1    omeprazole (PRILOSEC) 20 MG delayed release capsule, Take 1 capsule by mouth 2 times daily as needed (pyrosis), Disp: 180 capsule, Rfl: 1    Ferrous Sulfate (IRON) 325 (65 FE) MG TABS, Take 1 tablet by mouth daily, Disp: , Rfl:     Coenzyme Q10 (COQ10 PO), Take  by mouth daily. , Disp: , Rfl:     Cyanocobalamin (VITAMIN B-12 CR PO), Take  by mouth daily. , Disp: , Rfl:     UNABLE TO FIND, OTC sun chlorra-5 tabs daily, Disp: , Rfl:       Physical Exam:      Vitals:    Vitals:    03/06/23 1403   BP: 120/78   Pulse: 72   Resp: 18   Temp: 98.1 °F (36.7 °C)       167 lb 9.6 oz (76 kg)       Temp: 98.1 °F (36.7 °C) I @FLOWSTAT(6)@ IHeart Rate: 72 I @FLOWSTAT(8)@ I BP: 120/78 I @MRUWIF(20)@; @MWDJQF(99)@ I Resp: 18 I @FLOWSTAT(9)@ I   I @FLOWSTAT(10)@ I   I   I   I Facility age limit for growth percentiles is 20 years. I     Facility age limit for growth percentiles is 20 years. Facility age limit for growth percentiles is 20 years. Facility age limit for growth percentiles is 20 years. Facility age limit for growth percentiles is 20 years. Physical Exam:    Physical Exam  Vitals and nursing note reviewed. Constitutional:       Appearance: Normal appearance. She is normal weight. HENT:      Head: Normocephalic and atraumatic. Right Ear: Ear canal and external ear normal.      Left Ear: Ear canal and external ear normal.      Nose: Nose normal.      Mouth/Throat:      Mouth: Mucous membranes are moist.      Pharynx: Oropharynx is clear. Eyes:      Extraocular Movements: Extraocular movements intact. Conjunctiva/sclera: Conjunctivae normal.      Pupils: Pupils are equal, round, and reactive to light. Cardiovascular:      Rate and Rhythm: Normal rate and regular rhythm. Pulses: Normal pulses. Heart sounds: Normal heart sounds. Pulmonary:      Effort: Pulmonary effort is normal.      Breath sounds: Normal breath sounds. Musculoskeletal:         General: Normal range of motion. Cervical back: Normal range of motion and neck supple. Skin:     General: Skin is warm and dry. Capillary Refill: Capillary refill takes less than 2 seconds. Neurological:      General: No focal deficit present. Mental Status: She is alert and oriented to person, place, and time. Mental status is at baseline. Psychiatric:         Mood and Affect: Mood normal.         Behavior: Behavior normal.         Thought Content: Thought content normal.         Judgment: Judgment normal.           DATA:  Lab Review:  CBC:   Lab Results   Component Value Date/Time    WBC 5.8 2021 10:57 AM    RBC 5.08 2021 10:57 AM    HGB 15.4 2021 10:57 AM    HCT 47.2 2021 10:57 AM    MCV 92.9 2021 10:57 AM    MCH 30.3 2021 10:57 AM    MCHC 32.6 2021 10:57 AM    RDW 15.9 2015 01:50 PM     2021 10:57 AM          IgE   Date/Time Value Ref Range Status   2021 10:57 AM 16 <101 IU/mL Final     Comment:     [address] [address] [phone]      IgG   Date/Time Value Ref Range Status   2021 10:56  700 - 1600 mg/dL Final     Comment:     [address] [phone]     IgA   Date/Time Value Ref Range Status   2021 10:57  70 - 400 mg/dL Final     Comment:     [address] [phone]      IgM   Date/Time Value Ref Range Status   2021 10:56  40 - 230 mg/dL Final     Comment:     [address] [phone]       No results found for: RAJESH   Rheumatoid Factor   Date/Time Value Ref Range Status   10/21/2021 04:27 PM 11 0 - 13 IU/mL Final     Comment:     Performed at [address] [place] [address], [address]          No results found for this or any previous visit. No results found for this or any previous visit. All current and previous medial labs have been reviewed and discussed with patient         Procedures: Allergy Testin2021          Assessment/Orders:    Diagnosis Orders   1.  Allergy to trees  cetirizine (ZYRTEC ALLERGY) 10 MG tablet    montelukast (SINGULAIR) 10 MG tablet      2. Non-seasonal allergic rhinitis due to pollen  cetirizine (ZYRTEC ALLERGY) 10 MG tablet    montelukast (SINGULAIR) 10 MG tablet      3. Allergy to mold  cetirizine (ZYRTEC ALLERGY) 10 MG tablet    montelukast (SINGULAIR) 10 MG tablet      4. Exposure to mold  cetirizine (ZYRTEC ALLERGY) 10 MG tablet    montelukast (SINGULAIR) 10 MG tablet      5. Acute seasonal allergic rhinitis due to pollen  cetirizine (ZYRTEC ALLERGY) 10 MG tablet    montelukast (SINGULAIR) 10 MG tablet      6. Chronic allergic rhinitis due to pollen  cetirizine (ZYRTEC ALLERGY) 10 MG tablet    montelukast (SINGULAIR) 10 MG tablet      7. Seasonal allergic rhinitis due to fungal spores  cetirizine (ZYRTEC ALLERGY) 10 MG tablet    montelukast (SINGULAIR) 10 MG tablet      8. Prophylactic immunotherapy  cetirizine (ZYRTEC ALLERGY) 10 MG tablet    montelukast (SINGULAIR) 10 MG tablet      9. Mite allergy  cetirizine (ZYRTEC ALLERGY) 10 MG tablet    montelukast (SINGULAIR) 10 MG tablet      10. Encounter for desensitization to pollen allergen  cetirizine (ZYRTEC ALLERGY) 10 MG tablet    montelukast (SINGULAIR) 10 MG tablet      11. Non-seasonal allergic rhinitis due to fungal spores  cetirizine (ZYRTEC ALLERGY) 10 MG tablet    montelukast (SINGULAIR) 10 MG tablet          All diagnostic imaging  have been personally reviewed     Plan:  Follow Up:1 year    Continue allergy injections once weekly until 06/01/2023 then may do every other week or every week depending on patients symptoms. Follow up with me 05/2024 - 14 months can call in for med refill    Spent 20 minutes of face-to-face time with the patient with well more than half of the visit being dedicated to the discussion of the various symptom problems, provided education of medications and disease process, as well as discussion of a therapeutic plan for each.   Face-to-face education time does not include any time that may have been spent for procedures.    (Please note that portions of this note may have been completed with a voice recognition program.  Efforts were made to edit the dictation but occasionally words are mis-transcribed.)         Signed:  JAYLEN Martell CNP  3/6/2023  2:46 PM

## 2023-03-06 NOTE — PROGRESS NOTES
After consent obtained/verified, allergy injection given in back of R/L arm(s). VIAL COLOR OF ALL VIALS TODAY IS RED    ALLERGY INJECTION FROM VIAL A GIVEN right  UPPER ARM IN THE AMOUNT OF 0.20 ML    ALLERGY INJECTION FROM VIAL B GIVEN left upper ARM IN THE AMOUNT OF 0.20 ML        Documentation of vial injection specific to arm(s) noted on Allergy Immunotherapy Administration Form. Patient waited 30 minutes for observation. Yes      Patient tolerated well without adverse reaction WHILE IN OFFICE    SHOT REACTION TREATMENT INSTRUCTIONS    During the 30 minute wait after an allergy injection the following symptoms should be reported:    Itching other than at the injection site  Hives or swelling other than at the injection site  Redness other than at the injection site  Difficulty breathing  Chest tightness  Difficulty swallowing  Throat tightness    If these symptoms occur, NOTIFY PROVIDER and the following treatment should be administered:    1. Epinephrine/Auvi Q 1:1000 IM - 0.3 ml if > 66 lbs or more, 0.15 ml if 33 - 63 lbs, or 0.1 ml if <33 lbs     2. Diphenhydramine - give all intramuscular:     2 to <6 years (off-label use): 6.25 mg,    6 to <12 years: 12.5 to 25 mg;    ?12 years: 25-50 mg.    3.  Famotidine:  Adults 40 mg oral    Adolescents age 12 years and >88 lbs: 40 mg    Children and Adolescents ? 12years of age: Initial: 0.25 mg/kg/dose  every 12 hours (maximum daily dose: 40 mg/day)    Epi/Auvi Q dose may me repeated in 5-15 minutes if adequate resolution of symptoms does not occur    Patient should be observed for at least one hour after final Epi/Auvi Q dose and must be seen by provider. Patients cannot drive themselves if they have received diphenhydramine.

## 2023-03-13 ENCOUNTER — NURSE ONLY (OUTPATIENT)
Dept: ALLERGY | Age: 63
End: 2023-03-13
Payer: COMMERCIAL

## 2023-03-13 VITALS
TEMPERATURE: 97.6 F | HEART RATE: 78 BPM | SYSTOLIC BLOOD PRESSURE: 126 MMHG | RESPIRATION RATE: 16 BRPM | OXYGEN SATURATION: 97 % | DIASTOLIC BLOOD PRESSURE: 76 MMHG

## 2023-03-13 DIAGNOSIS — Z91.048 ALLERGY TO MOLD: ICD-10-CM

## 2023-03-13 DIAGNOSIS — Z91.09 ENCOUNTER FOR DESENSITIZATION TO POLLEN ALLERGEN: Primary | ICD-10-CM

## 2023-03-13 DIAGNOSIS — Z51.6 ENCOUNTER FOR DESENSITIZATION TO POLLEN ALLERGEN: Primary | ICD-10-CM

## 2023-03-13 DIAGNOSIS — J30.1 ALLERGY TO TREES: ICD-10-CM

## 2023-03-13 PROCEDURE — 95117 IMMUNOTHERAPY INJECTIONS: CPT | Performed by: NURSE PRACTITIONER

## 2023-03-13 NOTE — PROGRESS NOTES
After consent obtained/verified, allergy injection given in back of R/L arm(s). VIAL COLOR OF ALL VIALS TODAY IS red    ALLERGY INJECTION FROM VIAL A GIVEN left  UPPER ARM IN THE AMOUNT OF 0.25 ML    ALLERGY INJECTION FROM VIAL B GIVEN right upper ARM IN THE AMOUNT OF 0.25 ML        Documentation of vial injection specific to arm(s) noted on Allergy Immunotherapy Administration Form. Patient waited 30 minutes for observation. No      Patient tolerated well without adverse reaction WHILE IN OFFICE    SHOT REACTION TREATMENT INSTRUCTIONS    During the 30 minute wait after an allergy injection the following symptoms should be reported:    Itching other than at the injection site  Hives or swelling other than at the injection site  Redness other than at the injection site  Difficulty breathing  Chest tightness  Difficulty swallowing  Throat tightness    If these symptoms occur, NOTIFY PROVIDER and the following treatment should be administered:    1. Epinephrine/Auvi Q 1:1000 IM - 0.3 ml if > 66 lbs or more, 0.15 ml if 33 - 63 lbs, or 0.1 ml if <33 lbs     2. Diphenhydramine - give all intramuscular:     2 to <6 years (off-label use): 6.25 mg,    6 to <12 years: 12.5 to 25 mg;    ?12 years: 25-50 mg.    3.  Famotidine:  Adults 40 mg oral    Adolescents age 12 years and >88 lbs: 40 mg    Children and Adolescents ? 12years of age: Initial: 0.25 mg/kg/dose  every 12 hours (maximum daily dose: 40 mg/day)    Epi/Auvi Q dose may me repeated in 5-15 minutes if adequate resolution of symptoms does not occur    Patient should be observed for at least one hour after final Epi/Auvi Q dose and must be seen by provider. Patients cannot drive themselves if they have received diphenhydramine.

## 2023-03-27 ENCOUNTER — NURSE ONLY (OUTPATIENT)
Dept: ALLERGY | Age: 63
End: 2023-03-27
Payer: COMMERCIAL

## 2023-03-27 VITALS
HEART RATE: 82 BPM | RESPIRATION RATE: 20 BRPM | SYSTOLIC BLOOD PRESSURE: 124 MMHG | DIASTOLIC BLOOD PRESSURE: 72 MMHG | OXYGEN SATURATION: 99 %

## 2023-03-27 DIAGNOSIS — Z91.09 ENCOUNTER FOR DESENSITIZATION TO POLLEN ALLERGEN: Primary | ICD-10-CM

## 2023-03-27 DIAGNOSIS — Z91.048 ALLERGY TO MOLD: ICD-10-CM

## 2023-03-27 DIAGNOSIS — J30.1 ALLERGY TO TREES: ICD-10-CM

## 2023-03-27 DIAGNOSIS — Z51.6 ENCOUNTER FOR DESENSITIZATION TO POLLEN ALLERGEN: Primary | ICD-10-CM

## 2023-03-27 PROCEDURE — 95117 IMMUNOTHERAPY INJECTIONS: CPT | Performed by: NURSE PRACTITIONER

## 2023-03-27 NOTE — PROGRESS NOTES
After consent obtained/verified, allergy injection given in back of R/L arm(s).      VIAL COLOR OF ALL VIALS TODAY IS RED    ALLERGY INJECTION FROM VIAL A GIVEN right  UPPER ARM IN THE AMOUNT OF 0.30 ML    ALLERGY INJECTION FROM VIAL B GIVEN left upper ARM IN THE AMOUNT OF 0.30 ML          Documentation of vial injection specific to arm(s) noted on Allergy Immunotherapy Administration Form.         Patient waited 30 minutes for observation.No      Patient tolerated well without adverse reaction WHILE IN OFFICE    SHOT REACTION TREATMENT INSTRUCTIONS    During the 30 minute wait after an allergy injection the following symptoms should be reported:    Itching other than at the injection site  Hives or swelling other than at the injection site  Redness other than at the injection site  Difficulty breathing  Chest tightness  Difficulty swallowing  Throat tightness    If these symptoms occur, NOTIFY PROVIDER and the following treatment should be administered:    1.  Epinephrine/Auvi Q 1:1000 IM - 0.3 ml if > 66 lbs or more, 0.15 ml if 33 - 63 lbs, or 0.1 ml if <33 lbs     2.  Diphenhydramine - give all intramuscular:     2 to <6 years (off-label use): 6.25 mg,    6 to <12 years: 12.5 to 25 mg;    ?12 years: 25-50 mg.    3.  Famotidine:  Adults 40 mg oral    Adolescents age 16 years and >88 lbs: 40 mg    Children and Adolescents ?16 years of age: Initial: 0.25 mg/kg/dose  every 12 hours (maximum daily dose: 40 mg/day)    Epi/Auvi Q dose may me repeated in 5-15 minutes if adequate resolution of symptoms does not occur    Patient should be observed for at least one hour after final Epi/Auvi Q dose and must be seen by provider.  Patients cannot drive themselves if they have received diphenhydramine.

## 2023-03-28 NOTE — PROGRESS NOTES
After consent obtained/verified, allergy injection given in back of R/L arm(s). VIAL COLOR OF ALL VIALS TODAY IS red    ALLERGY INJECTION FROM VIAL A GIVEN left  UPPER ARM IN THE AMOUNT OF 0.50 ML    ALLERGY INJECTION FROM VIAL B GIVEN right upper ARM IN THE AMOUNT OF 0.50 ML        Documentation of vial injection specific to arm(s) noted on Allergy Immunotherapy Administration Form. Patient waited 30 minutes for observation. No      Patient tolerated well without adverse reaction WHILE IN OFFICE    SHOT REACTION TREATMENT INSTRUCTIONS    During the 30 minute wait after an allergy injection the following symptoms should be reported:    Itching other than at the injection site  Hives or swelling other than at the injection site  Redness other than at the injection site  Difficulty breathing  Chest tightness  Difficulty swallowing  Throat tightness    If these symptoms occur, NOTIFY PROVIDER and the following treatment should be administered:    1. Epinephrine/Auvi Q 1:1000 IM - 0.3 ml if > 66 lbs or more, 0.15 ml if 33 - 63 lbs, or 0.1 ml if <33 lbs     2. Diphenhydramine - give all intramuscular:     2 to <6 years (off-label use): 6.25 mg,    6 to <12 years: 12.5 to 25 mg;    ?12 years: 25-50 mg.    3.  Famotidine:  Adults 40 mg oral    Adolescents age 12 years and >88 lbs: 40 mg    Children and Adolescents ? 12years of age: Initial: 0.25 mg/kg/dose  every 12 hours (maximum daily dose: 40 mg/day)    Epi/Auvi Q dose may me repeated in 5-15 minutes if adequate resolution of symptoms does not occur    Patient should be observed for at least one hour after final Epi/Auvi Q dose and must be seen by provider. Patients cannot drive themselves if they have received diphenhydramine. yes

## 2023-04-03 ENCOUNTER — NURSE ONLY (OUTPATIENT)
Dept: ALLERGY | Age: 63
End: 2023-04-03
Payer: COMMERCIAL

## 2023-04-03 VITALS
RESPIRATION RATE: 16 BRPM | SYSTOLIC BLOOD PRESSURE: 128 MMHG | DIASTOLIC BLOOD PRESSURE: 80 MMHG | OXYGEN SATURATION: 98 % | HEART RATE: 78 BPM

## 2023-04-03 DIAGNOSIS — Z91.048 ALLERGY TO MOLD: ICD-10-CM

## 2023-04-03 DIAGNOSIS — Z91.09 ENCOUNTER FOR DESENSITIZATION TO POLLEN ALLERGEN: Primary | ICD-10-CM

## 2023-04-03 DIAGNOSIS — J30.1 ALLERGY TO TREES: ICD-10-CM

## 2023-04-03 DIAGNOSIS — Z51.6 ENCOUNTER FOR DESENSITIZATION TO POLLEN ALLERGEN: Primary | ICD-10-CM

## 2023-04-03 PROCEDURE — 95117 IMMUNOTHERAPY INJECTIONS: CPT | Performed by: NURSE PRACTITIONER

## 2023-04-03 NOTE — PROGRESS NOTES
After consent obtained/verified, allergy injection given in back of R/L arm(s). VIAL COLOR OF ALL VIALS TODAY IS red    ALLERGY INJECTION FROM VIAL A GIVEN left  UPPER ARM IN THE AMOUNT OF 0.35 ML    ALLERGY INJECTION FROM VIAL B GIVEN right upper ARM IN THE AMOUNT OF 0.35 ML          Documentation of vial injection specific to arm(s) noted on Allergy Immunotherapy Administration Form. Patient waited 30 minutes for observation. No      Patient tolerated well without adverse reaction WHILE IN OFFICE    SHOT REACTION TREATMENT INSTRUCTIONS    During the 30 minute wait after an allergy injection the following symptoms should be reported:    Itching other than at the injection site  Hives or swelling other than at the injection site  Redness other than at the injection site  Difficulty breathing  Chest tightness  Difficulty swallowing  Throat tightness    If these symptoms occur, NOTIFY PROVIDER and the following treatment should be administered:    1. Epinephrine/Auvi Q 1:1000 IM - 0.3 ml if > 66 lbs or more, 0.15 ml if 33 - 63 lbs, or 0.1 ml if <33 lbs     2. Diphenhydramine - give all intramuscular:     2 to <6 years (off-label use): 6.25 mg,    6 to <12 years: 12.5 to 25 mg;    ?12 years: 25-50 mg.    3.  Famotidine:  Adults 40 mg oral    Adolescents age 12 years and >88 lbs: 40 mg    Children and Adolescents ? 12years of age: Initial: 0.25 mg/kg/dose  every 12 hours (maximum daily dose: 40 mg/day)    Epi/Auvi Q dose may me repeated in 5-15 minutes if adequate resolution of symptoms does not occur    Patient should be observed for at least one hour after final Epi/Auvi Q dose and must be seen by provider. Patients cannot drive themselves if they have received diphenhydramine. No

## 2023-04-17 ENCOUNTER — NURSE ONLY (OUTPATIENT)
Dept: ALLERGY | Age: 63
End: 2023-04-17
Payer: COMMERCIAL

## 2023-04-17 VITALS
DIASTOLIC BLOOD PRESSURE: 84 MMHG | SYSTOLIC BLOOD PRESSURE: 130 MMHG | TEMPERATURE: 97.3 F | OXYGEN SATURATION: 94 % | HEART RATE: 92 BPM

## 2023-04-17 DIAGNOSIS — Z51.6 ENCOUNTER FOR DESENSITIZATION TO POLLEN ALLERGEN: Primary | ICD-10-CM

## 2023-04-17 DIAGNOSIS — Z91.048 ALLERGY TO MOLD: ICD-10-CM

## 2023-04-17 DIAGNOSIS — J30.1 ALLERGY TO TREES: ICD-10-CM

## 2023-04-17 DIAGNOSIS — Z91.09 ENCOUNTER FOR DESENSITIZATION TO POLLEN ALLERGEN: Primary | ICD-10-CM

## 2023-04-17 PROCEDURE — 95117 IMMUNOTHERAPY INJECTIONS: CPT | Performed by: NURSE PRACTITIONER

## 2023-04-17 NOTE — PROGRESS NOTES
After consent obtained/verified, allergy injection given in back of R/L arm(s). VIAL COLOR OF ALL VIALS TODAY IS red    ALLERGY INJECTION FROM VIAL A GIVEN left  UPPER ARM IN THE AMOUNT OF 0.45 ML    ALLERGY INJECTION FROM VIAL B GIVEN right upper ARM IN THE AMOUNT OF 0.45 ML      Documentation of vial injection specific to arm(s) noted on Allergy Immunotherapy Administration Form. Patient waited 30 minutes for observation. No      Patient tolerated well without adverse reaction WHILE IN OFFICE    SHOT REACTION TREATMENT INSTRUCTIONS    During the 30 minute wait after an allergy injection the following symptoms should be reported:    Itching other than at the injection site  Hives or swelling other than at the injection site  Redness other than at the injection site  Difficulty breathing  Chest tightness  Difficulty swallowing  Throat tightness    If these symptoms occur, NOTIFY PROVIDER and the following treatment should be administered:    1. Epinephrine/Auvi Q 1:1000 IM - 0.3 ml if > 66 lbs or more, 0.15 ml if 33 - 63 lbs, or 0.1 ml if <33 lbs     2. Diphenhydramine - give all intramuscular:     2 to <6 years (off-label use): 6.25 mg,    6 to <12 years: 12.5 to 25 mg;    ?12 years: 25-50 mg.    3.  Famotidine:  Adults 40 mg oral    Adolescents age 12 years and >88 lbs: 40 mg    Children and Adolescents ? 12years of age: Initial: 0.25 mg/kg/dose  every 12 hours (maximum daily dose: 40 mg/day)    Epi/Auvi Q dose may me repeated in 5-15 minutes if adequate resolution of symptoms does not occur    Patient should be observed for at least one hour after final Epi/Auvi Q dose and must be seen by provider. Patients cannot drive themselves if they have received diphenhydramine.

## 2023-04-24 ENCOUNTER — NURSE ONLY (OUTPATIENT)
Dept: ALLERGY | Age: 63
End: 2023-04-24
Payer: COMMERCIAL

## 2023-04-24 VITALS
DIASTOLIC BLOOD PRESSURE: 80 MMHG | TEMPERATURE: 97.3 F | HEART RATE: 103 BPM | RESPIRATION RATE: 18 BRPM | SYSTOLIC BLOOD PRESSURE: 118 MMHG | OXYGEN SATURATION: 98 %

## 2023-04-24 DIAGNOSIS — J30.1 ALLERGY TO TREES: ICD-10-CM

## 2023-04-24 DIAGNOSIS — Z51.6 ENCOUNTER FOR DESENSITIZATION TO POLLEN ALLERGEN: Primary | ICD-10-CM

## 2023-04-24 DIAGNOSIS — Z91.09 ENCOUNTER FOR DESENSITIZATION TO POLLEN ALLERGEN: Primary | ICD-10-CM

## 2023-04-24 DIAGNOSIS — Z91.048 ALLERGY TO MOLD: ICD-10-CM

## 2023-04-24 PROCEDURE — 95117 IMMUNOTHERAPY INJECTIONS: CPT | Performed by: NURSE PRACTITIONER

## 2023-05-03 DIAGNOSIS — J30.9 CHRONIC ALLERGIC RHINITIS: Primary | ICD-10-CM

## 2023-05-03 DIAGNOSIS — Z29.8 IMMUNOTHERAPY: ICD-10-CM

## 2023-05-03 NOTE — PROGRESS NOTES
ALLERGY EXTRACT MAINTENANCE MIXING RED VIAL ONLY  PT'S CURRENT VIALS ARE EMPTY    Provider onsite during allergy extract preparation. TOTAL VIALS=  2  TOTAL DOSES PER VIAL = 20  TOTAL ANTICIPATED DOSES PREPARED = 40       An allergy extract maintenance solution was prepared in red vial according to the maintenance prescription. The refrigerator shelf-life for each red vial is 12 months or as indicated on the label. Patient vials were labeled with name, date-of-birth, vial number, concentration, and expiration. When injecting from a new maintenance vial, the first injections should be three doses below the previous maintenance dose. This is done because the refill may be slightly stronger than the vial that was just emptied. (Example: if the maintenance dose is 0.5, start at 0.35 and proceed to 0.4, 0.45, 0.5). The three build up injections may be given weekly until the maintenance dosage is reached again. Then every other week injections may be resumed. Patients on maintenance should be seen by the allergy provider every 6-12 months and as needed. The injection record and serum is reviewed and documented at every injection appointment. When down to the last 1/3 of the bottle, a maintenance refill will be prepared (pending patient is without reactions) for next refill. Patients requesting refills that receive injections at outside medical facilities must include the patient's demographic sheet, current insurance information and the injection records. Allow 2-3 weeks for delivery after the refill has been requested. PROTOCOL FOR LATE INJECTIONS  Days late determined from the due date of the injection    Shot Frequency 5-10 Days Late 11-16 Days Late 17-30 Days Late 31-60 Days Late 61+ Days Late   Twice Weekly Repeat last dose Reduce last dose . 2 Reduce last dose . 4 Dilute back 1 vial, start at .05 Patient must start over     Weekly Repeat last dose Reduce last dose . 2 Reduce last dose . 4

## 2023-05-08 ENCOUNTER — NURSE ONLY (OUTPATIENT)
Dept: ALLERGY | Age: 63
End: 2023-05-08
Payer: COMMERCIAL

## 2023-05-08 VITALS
HEART RATE: 71 BPM | DIASTOLIC BLOOD PRESSURE: 80 MMHG | RESPIRATION RATE: 18 BRPM | SYSTOLIC BLOOD PRESSURE: 126 MMHG | OXYGEN SATURATION: 98 %

## 2023-05-08 DIAGNOSIS — Z91.048 ALLERGY TO MOLD: ICD-10-CM

## 2023-05-08 DIAGNOSIS — Z51.6 ENCOUNTER FOR DESENSITIZATION TO POLLEN ALLERGEN: Primary | ICD-10-CM

## 2023-05-08 DIAGNOSIS — Z91.09 ENCOUNTER FOR DESENSITIZATION TO POLLEN ALLERGEN: Primary | ICD-10-CM

## 2023-05-08 DIAGNOSIS — J30.1 ALLERGY TO TREES: ICD-10-CM

## 2023-05-08 PROCEDURE — 95117 IMMUNOTHERAPY INJECTIONS: CPT | Performed by: NURSE PRACTITIONER

## 2023-05-30 ENCOUNTER — NURSE ONLY (OUTPATIENT)
Dept: ALLERGY | Age: 63
End: 2023-05-30
Payer: COMMERCIAL

## 2023-05-30 VITALS
OXYGEN SATURATION: 98 % | HEART RATE: 91 BPM | SYSTOLIC BLOOD PRESSURE: 118 MMHG | DIASTOLIC BLOOD PRESSURE: 82 MMHG | RESPIRATION RATE: 16 BRPM

## 2023-05-30 DIAGNOSIS — Z91.09 ENCOUNTER FOR DESENSITIZATION TO POLLEN ALLERGEN: Primary | ICD-10-CM

## 2023-05-30 DIAGNOSIS — J30.1 ALLERGY TO TREES: ICD-10-CM

## 2023-05-30 DIAGNOSIS — Z91.048 ALLERGY TO MOLD: ICD-10-CM

## 2023-05-30 DIAGNOSIS — Z51.6 ENCOUNTER FOR DESENSITIZATION TO POLLEN ALLERGEN: Primary | ICD-10-CM

## 2023-05-30 PROCEDURE — 95117 IMMUNOTHERAPY INJECTIONS: CPT | Performed by: NURSE PRACTITIONER

## 2023-06-29 ENCOUNTER — NURSE ONLY (OUTPATIENT)
Dept: ALLERGY | Age: 63
End: 2023-06-29
Payer: COMMERCIAL

## 2023-06-29 VITALS — OXYGEN SATURATION: 95 % | DIASTOLIC BLOOD PRESSURE: 80 MMHG | SYSTOLIC BLOOD PRESSURE: 118 MMHG | HEART RATE: 89 BPM

## 2023-06-29 DIAGNOSIS — Z51.6 ENCOUNTER FOR DESENSITIZATION TO POLLEN ALLERGEN: Primary | ICD-10-CM

## 2023-06-29 DIAGNOSIS — J30.1 ALLERGY TO TREES: ICD-10-CM

## 2023-06-29 DIAGNOSIS — Z91.09 ENCOUNTER FOR DESENSITIZATION TO POLLEN ALLERGEN: Primary | ICD-10-CM

## 2023-06-29 DIAGNOSIS — Z91.048 ALLERGY TO MOLD: ICD-10-CM

## 2023-06-29 PROCEDURE — 95117 IMMUNOTHERAPY INJECTIONS: CPT | Performed by: NURSE PRACTITIONER

## 2023-07-19 ENCOUNTER — NURSE ONLY (OUTPATIENT)
Dept: ALLERGY | Age: 63
End: 2023-07-19
Payer: COMMERCIAL

## 2023-07-19 VITALS — DIASTOLIC BLOOD PRESSURE: 82 MMHG | SYSTOLIC BLOOD PRESSURE: 143 MMHG | OXYGEN SATURATION: 94 % | HEART RATE: 94 BPM

## 2023-07-19 DIAGNOSIS — Z91.09 ENCOUNTER FOR DESENSITIZATION TO POLLEN ALLERGEN: Primary | ICD-10-CM

## 2023-07-19 DIAGNOSIS — Z51.6 ENCOUNTER FOR DESENSITIZATION TO POLLEN ALLERGEN: Primary | ICD-10-CM

## 2023-07-19 DIAGNOSIS — Z91.048 ALLERGY TO MOLD: ICD-10-CM

## 2023-07-19 DIAGNOSIS — J30.1 ALLERGY TO TREES: ICD-10-CM

## 2023-07-19 PROCEDURE — 95117 IMMUNOTHERAPY INJECTIONS: CPT | Performed by: NURSE PRACTITIONER

## 2023-08-01 ENCOUNTER — NURSE ONLY (OUTPATIENT)
Dept: ALLERGY | Age: 63
End: 2023-08-01
Payer: COMMERCIAL

## 2023-08-01 VITALS
DIASTOLIC BLOOD PRESSURE: 79 MMHG | RESPIRATION RATE: 16 BRPM | SYSTOLIC BLOOD PRESSURE: 136 MMHG | HEART RATE: 75 BPM | OXYGEN SATURATION: 99 %

## 2023-08-01 DIAGNOSIS — Z91.09 ENCOUNTER FOR DESENSITIZATION TO POLLEN ALLERGEN: ICD-10-CM

## 2023-08-01 DIAGNOSIS — Z91.048 ALLERGY TO MOLD: ICD-10-CM

## 2023-08-01 DIAGNOSIS — Z51.6 ENCOUNTER FOR DESENSITIZATION TO POLLEN ALLERGEN: ICD-10-CM

## 2023-08-01 DIAGNOSIS — J30.1 ALLERGY TO TREES: Primary | ICD-10-CM

## 2023-08-01 PROCEDURE — 95117 IMMUNOTHERAPY INJECTIONS: CPT | Performed by: NURSE PRACTITIONER

## 2023-08-01 NOTE — PROGRESS NOTES
After consent obtained/verified, allergy injection given in back of R/L arm(s). VIAL COLOR OF ALL VIALS TODAY IS red 1:1    ALLERGY INJECTION FROM VIAL A GIVEN right  UPPER ARM IN THE AMOUNT OF 0.50 ML    ALLERGY INJECTION FROM VIAL B GIVEN left upper ARM IN THE AMOUNT OF 0.50  ML        Documentation of vial injection specific to arm(s) noted on Allergy Immunotherapy Administration Form. Patient waited 30 minutes for observation. No      Patient tolerated well without adverse reaction WHILE IN OFFICE    SHOT REACTION TREATMENT INSTRUCTIONS    During the 30 minute wait after an allergy injection the following symptoms should be reported:    Itching other than at the injection site  Hives or swelling other than at the injection site  Redness other than at the injection site  Difficulty breathing  Chest tightness  Difficulty swallowing  Throat tightness    If these symptoms occur, NOTIFY PROVIDER and the following treatment should be administered:    1. Epinephrine/Auvi Q 1:1000 IM - 0.3 ml if > 66 lbs or more, 0.15 ml if 33 - 63 lbs, or 0.1 ml if <33 lbs     2. Diphenhydramine - give all intramuscular:     2 to <6 years (off-label use): 6.25 mg,    6 to <12 years: 12.5 to 25 mg;    ?12 years: 25-50 mg.    3.  Famotidine:  Adults 40 mg oral    Adolescents age 12 years and >88 lbs: 40 mg    Children and Adolescents ? 12years of age: Initial: 0.25 mg/kg/dose  every 12 hours (maximum daily dose: 40 mg/day)    Epi/Auvi Q dose may me repeated in 5-15 minutes if adequate resolution of symptoms does not occur    Patient should be observed for at least one hour after final Epi/Auvi Q dose and must be seen by provider. Patients cannot drive themselves if they have received diphenhydramine.

## 2023-08-17 ENCOUNTER — NURSE ONLY (OUTPATIENT)
Dept: ALLERGY | Age: 63
End: 2023-08-17
Payer: COMMERCIAL

## 2023-08-17 VITALS — DIASTOLIC BLOOD PRESSURE: 83 MMHG | SYSTOLIC BLOOD PRESSURE: 131 MMHG

## 2023-08-17 DIAGNOSIS — Z91.048 ALLERGY TO MOLD: ICD-10-CM

## 2023-08-17 DIAGNOSIS — Z91.09 ENCOUNTER FOR DESENSITIZATION TO POLLEN ALLERGEN: ICD-10-CM

## 2023-08-17 DIAGNOSIS — Z51.6 ENCOUNTER FOR DESENSITIZATION TO POLLEN ALLERGEN: ICD-10-CM

## 2023-08-17 DIAGNOSIS — J30.1 ALLERGY TO TREES: Primary | ICD-10-CM

## 2023-08-17 PROCEDURE — 95117 IMMUNOTHERAPY INJECTIONS: CPT | Performed by: NURSE PRACTITIONER

## 2023-08-17 NOTE — PROGRESS NOTES
After consent obtained/verified, allergy injection given in back of R/L arm(s). VIAL COLOR OF ALL VIALS TODAY IS red 1:1    ALLERGY INJECTION FROM VIAL A GIVEN left  UPPER ARM IN THE AMOUNT OF 0.50 ML    ALLERGY INJECTION FROM VIAL B GIVEN right upper ARM IN THE AMOUNT OF 0.50  ML        Documentation of vial injection specific to arm(s) noted on Allergy Immunotherapy Administration Form. Patient waited 30 minutes for observation. No      Patient tolerated well without adverse reaction WHILE IN OFFICE    SHOT REACTION TREATMENT INSTRUCTIONS    During the 30 minute wait after an allergy injection the following symptoms should be reported:    Itching other than at the injection site  Hives or swelling other than at the injection site  Redness other than at the injection site  Difficulty breathing  Chest tightness  Difficulty swallowing  Throat tightness    If these symptoms occur, NOTIFY PROVIDER and the following treatment should be administered:    1. Epinephrine/Auvi Q 1:1000 IM - 0.3 ml if > 66 lbs or more, 0.15 ml if 33 - 63 lbs, or 0.1 ml if <33 lbs     2. Diphenhydramine - give all intramuscular:     2 to <6 years (off-label use): 6.25 mg,    6 to <12 years: 12.5 to 25 mg;    ?12 years: 25-50 mg.    3.  Famotidine:  Adults 40 mg oral    Adolescents age 12 years and >88 lbs: 40 mg    Children and Adolescents ? 12years of age: Initial: 0.25 mg/kg/dose  every 12 hours (maximum daily dose: 40 mg/day)    Epi/Auvi Q dose may me repeated in 5-15 minutes if adequate resolution of symptoms does not occur    Patient should be observed for at least one hour after final Epi/Auvi Q dose and must be seen by provider. Patients cannot drive themselves if they have received diphenhydramine.

## 2023-09-11 ENCOUNTER — NURSE ONLY (OUTPATIENT)
Dept: ALLERGY | Age: 63
End: 2023-09-11
Payer: COMMERCIAL

## 2023-09-11 VITALS — SYSTOLIC BLOOD PRESSURE: 115 MMHG | HEART RATE: 84 BPM | DIASTOLIC BLOOD PRESSURE: 72 MMHG

## 2023-09-11 DIAGNOSIS — J30.1 ALLERGY TO TREES: Primary | ICD-10-CM

## 2023-09-11 DIAGNOSIS — Z91.048 ALLERGY TO MOLD: ICD-10-CM

## 2023-09-11 DIAGNOSIS — Z91.09 ENCOUNTER FOR DESENSITIZATION TO POLLEN ALLERGEN: ICD-10-CM

## 2023-09-11 DIAGNOSIS — Z51.6 ENCOUNTER FOR DESENSITIZATION TO POLLEN ALLERGEN: ICD-10-CM

## 2023-09-11 PROCEDURE — 95117 IMMUNOTHERAPY INJECTIONS: CPT | Performed by: NURSE PRACTITIONER

## 2023-09-28 ENCOUNTER — NURSE ONLY (OUTPATIENT)
Dept: ALLERGY | Age: 63
End: 2023-09-28
Payer: COMMERCIAL

## 2023-09-28 VITALS
RESPIRATION RATE: 18 BRPM | DIASTOLIC BLOOD PRESSURE: 86 MMHG | SYSTOLIC BLOOD PRESSURE: 118 MMHG | HEART RATE: 80 BPM | OXYGEN SATURATION: 98 %

## 2023-09-28 DIAGNOSIS — Z91.09 ENCOUNTER FOR DESENSITIZATION TO POLLEN ALLERGEN: ICD-10-CM

## 2023-09-28 DIAGNOSIS — Z91.048 ALLERGY TO MOLD: ICD-10-CM

## 2023-09-28 DIAGNOSIS — Z51.6 ENCOUNTER FOR DESENSITIZATION TO POLLEN ALLERGEN: ICD-10-CM

## 2023-09-28 DIAGNOSIS — J30.1 ALLERGY TO TREES: Primary | ICD-10-CM

## 2023-09-28 PROCEDURE — 95117 IMMUNOTHERAPY INJECTIONS: CPT | Performed by: NURSE PRACTITIONER

## 2023-10-10 ENCOUNTER — NURSE ONLY (OUTPATIENT)
Dept: ALLERGY | Age: 63
End: 2023-10-10
Payer: COMMERCIAL

## 2023-10-10 DIAGNOSIS — J30.1 ALLERGY TO TREES: Primary | ICD-10-CM

## 2023-10-10 DIAGNOSIS — Z91.048 ALLERGY TO MOLD: ICD-10-CM

## 2023-10-10 DIAGNOSIS — Z91.09 ENCOUNTER FOR DESENSITIZATION TO POLLEN ALLERGEN: ICD-10-CM

## 2023-10-10 DIAGNOSIS — Z51.6 ENCOUNTER FOR DESENSITIZATION TO POLLEN ALLERGEN: ICD-10-CM

## 2023-10-10 PROCEDURE — 95117 IMMUNOTHERAPY INJECTIONS: CPT | Performed by: NURSE PRACTITIONER

## 2023-10-24 ENCOUNTER — NURSE ONLY (OUTPATIENT)
Dept: ALLERGY | Age: 63
End: 2023-10-24
Payer: COMMERCIAL

## 2023-10-24 VITALS — SYSTOLIC BLOOD PRESSURE: 116 MMHG | DIASTOLIC BLOOD PRESSURE: 80 MMHG

## 2023-10-24 DIAGNOSIS — Z91.048 ALLERGY TO MOLD: ICD-10-CM

## 2023-10-24 DIAGNOSIS — J30.1 ALLERGY TO TREES: Primary | ICD-10-CM

## 2023-10-24 DIAGNOSIS — Z51.6 ENCOUNTER FOR DESENSITIZATION TO POLLEN ALLERGEN: ICD-10-CM

## 2023-10-24 DIAGNOSIS — Z91.09 ENCOUNTER FOR DESENSITIZATION TO POLLEN ALLERGEN: ICD-10-CM

## 2023-10-24 PROCEDURE — 95117 IMMUNOTHERAPY INJECTIONS: CPT | Performed by: NURSE PRACTITIONER

## 2023-11-10 ENCOUNTER — NURSE ONLY (OUTPATIENT)
Dept: ALLERGY | Age: 63
End: 2023-11-10

## 2023-11-10 VITALS — DIASTOLIC BLOOD PRESSURE: 71 MMHG | SYSTOLIC BLOOD PRESSURE: 139 MMHG

## 2023-11-10 DIAGNOSIS — J30.1 ALLERGY TO TREES: Primary | ICD-10-CM

## 2023-11-10 DIAGNOSIS — Z51.6 ENCOUNTER FOR DESENSITIZATION TO POLLEN ALLERGEN: ICD-10-CM

## 2023-11-10 DIAGNOSIS — Z91.048 ALLERGY TO MOLD: ICD-10-CM

## 2023-11-10 DIAGNOSIS — Z91.09 ENCOUNTER FOR DESENSITIZATION TO POLLEN ALLERGEN: ICD-10-CM

## 2024-01-08 ENCOUNTER — NURSE ONLY (OUTPATIENT)
Dept: ALLERGY | Age: 64
End: 2024-01-08
Payer: COMMERCIAL

## 2024-01-08 VITALS
HEART RATE: 63 BPM | SYSTOLIC BLOOD PRESSURE: 118 MMHG | OXYGEN SATURATION: 98 % | DIASTOLIC BLOOD PRESSURE: 64 MMHG | RESPIRATION RATE: 14 BRPM

## 2024-01-08 DIAGNOSIS — Z91.048 ALLERGY TO MOLD: ICD-10-CM

## 2024-01-08 DIAGNOSIS — Z51.6 ENCOUNTER FOR DESENSITIZATION TO POLLEN ALLERGEN: Primary | ICD-10-CM

## 2024-01-08 DIAGNOSIS — Z91.09 ENCOUNTER FOR DESENSITIZATION TO POLLEN ALLERGEN: Primary | ICD-10-CM

## 2024-01-08 DIAGNOSIS — J30.1 ALLERGY TO TREES: ICD-10-CM

## 2024-01-08 PROCEDURE — 95117 IMMUNOTHERAPY INJECTIONS: CPT | Performed by: NURSE PRACTITIONER

## 2024-01-08 NOTE — PROGRESS NOTES
After consent obtained/verified, allergy injection given in back of R/L arm(s).      VIAL COLOR OF ALL VIALS TODAY IS red 1:1    ALLERGY INJECTION FROM VIAL A GIVEN right  UPPER ARM IN THE AMOUNT OF 0.50 ML    ALLERGY INJECTION FROM VIAL B GIVEN left upper ARM IN THE AMOUNT OF 0.50  ML    Documentation of vial injection specific to arm(s) noted on Allergy Immunotherapy Administration Form.         Patient waited 30 minutes for observation.No      Patient tolerated well without adverse reaction WHILE IN OFFICE    SHOT REACTION TREATMENT INSTRUCTIONS    During the 30 minute wait after an allergy injection the following symptoms should be reported:    Itching other than at the injection site  Hives or swelling other than at the injection site  Redness other than at the injection site  Difficulty breathing  Chest tightness  Difficulty swallowing  Throat tightness    If these symptoms occur, NOTIFY PROVIDER and the following treatment should be administered:    1.  Epinephrine/Auvi Q 1:1000 IM - 0.3 ml if > 66 lbs or more, 0.15 ml if 33 - 63 lbs, or 0.1 ml if <33 lbs     2.  Diphenhydramine - give all intramuscular:     2 to <6 years (off-label use): 6.25 mg,    6 to <12 years: 12.5 to 25 mg;    ?12 years: 25-50 mg.    3.  Famotidine:  Adults 40 mg oral    Adolescents age 16 years and >88 lbs: 40 mg    Children and Adolescents ?16 years of age: Initial: 0.25 mg/kg/dose  every 12 hours (maximum daily dose: 40 mg/day)    Epi/Auvi Q dose may me repeated in 5-15 minutes if adequate resolution of symptoms does not occur    Patient should be observed for at least one hour after final Epi/Auvi Q dose and must be seen by provider.  Patients cannot drive themselves if they have received diphenhydramine.

## 2024-01-30 ENCOUNTER — NURSE ONLY (OUTPATIENT)
Dept: ALLERGY | Age: 64
End: 2024-01-30
Payer: COMMERCIAL

## 2024-01-30 DIAGNOSIS — Z91.048 ALLERGY TO MOLD: ICD-10-CM

## 2024-01-30 DIAGNOSIS — J30.1 ALLERGY TO TREES: Primary | ICD-10-CM

## 2024-01-30 DIAGNOSIS — J30.1 NON-SEASONAL ALLERGIC RHINITIS DUE TO POLLEN: ICD-10-CM

## 2024-01-30 PROCEDURE — 95117 IMMUNOTHERAPY INJECTIONS: CPT | Performed by: NURSE PRACTITIONER

## 2024-01-30 NOTE — PROGRESS NOTES
PATIENT HAS THE FOLLOWING DIAGNOSIS SUPPORTING ADMINISTRATION OF ALLERGY INJECTIONS: J30.1Allergic rhinitis due to pollen  AND [id number] MULTIPLE ALLERGY INJECTIONS FOR ALLERGY INJECTION ADMINISTRATION      Patient here for allergy injection today.  Patient was presented with the opportunity to speak with provider and ask questions regarding allergy injections.  Patient was also instructed they need to wait 30 minutes after receiving allergy injections. All risks associated with potential adverse effects have been explained to patient and patient handout was provided following allergy testing.    After consent obtained/verified, allergy injection subcutaneously given in back of arm(s).  Please see below for specific details of site injections, concentration of serum, and volume injected.    VIAL COLOR OF ALL VIALS TODAY IS red 1:1. Vial allergy w/v concentration today is: 1:1     ALLERGY INJECTION FROM VIAL A GIVEN left  UPPER ARM IN THE AMOUNT OF 0.50 ML    ALLERGY INJECTION FROM VIAL B GIVEN right upper ARM IN THE AMOUNT OF 0.50  ML        Documentation of vial injection specific to arm(s) noted on Allergy Immunotherapy Administration Form.       Patient waited 30 minutes for observation.No  Patient has received information and verbalizes understanding of the potential  health risks associated with allergy injections . Patient understands risks including anaphylaxis and chooses not to wait 30 minutes after administration of allergy injection(s).    Patient tolerated well without adverse reaction while the patient was in the office.    SHOT REACTION TREATMENT INSTRUCTIONS    During the 30 minute wait after an allergy injection the following symptoms should be reported:    Itching other than at the injection site  Hives or swelling other than at the injection site  Redness other than at the injection site  Difficulty breathing  Chest tightness  Difficulty swallowing  Throat tightness    If these symptoms occur, NOTIFY

## 2024-02-13 ENCOUNTER — NURSE ONLY (OUTPATIENT)
Dept: ALLERGY | Age: 64
End: 2024-02-13
Payer: COMMERCIAL

## 2024-02-13 VITALS
HEART RATE: 72 BPM | OXYGEN SATURATION: 100 % | RESPIRATION RATE: 16 BRPM | SYSTOLIC BLOOD PRESSURE: 132 MMHG | DIASTOLIC BLOOD PRESSURE: 86 MMHG

## 2024-02-13 DIAGNOSIS — Z91.048 ALLERGY TO MOLD: ICD-10-CM

## 2024-02-13 DIAGNOSIS — J30.1 ALLERGY TO TREES: ICD-10-CM

## 2024-02-13 DIAGNOSIS — J30.1 ALLERGIC RHINITIS DUE TO POLLEN, UNSPECIFIED SEASONALITY: Primary | ICD-10-CM

## 2024-02-13 PROCEDURE — 95117 IMMUNOTHERAPY INJECTIONS: CPT | Performed by: NURSE PRACTITIONER

## 2024-02-13 NOTE — PROGRESS NOTES
PATIENT HAS THE FOLLOWING DIAGNOSIS SUPPORTING ADMINISTRATION OF ALLERGY INJECTIONS: J30.1Allergic rhinitis due to pollen  AND [id number] MULTIPLE ALLERGY INJECTIONS FOR ALLERGY INJECTION ADMINISTRATION      Patient here for allergy injection today.  Patient was presented with the opportunity to speak with provider and ask questions regarding allergy injections.  Patient was also instructed they need to wait 30 minutes after receiving allergy injections. All risks associated with potential adverse effects have been explained to patient and patient handout was provided following allergy testing.    After consent obtained/verified, allergy injection subcutaneously given in back of arm(s).  Please see below for specific details of site injections, concentration of serum, and volume injected.    VIAL COLOR OF ALL VIALS TODAY IS red 1:1. Vial allergy w/v concentration today is: 1:1     ALLERGY INJECTION FROM VIAL A GIVEN right  UPPER ARM IN THE AMOUNT OF 0.50 ML    ALLERGY INJECTION FROM VIAL B GIVEN left upper ARM IN THE AMOUNT OF 0.50  ML      Documentation of vial injection specific to arm(s) noted on Allergy Immunotherapy Administration Form.       Patient waited 30 minutes for observation.No  Patient has received information and verbalizes understanding of the potential  health risks associated with allergy injections . Patient understands risks including anaphylaxis and chooses not to wait 30 minutes after administration of allergy injection(s).    Patient tolerated well without adverse reaction while the patient was in the office.    SHOT REACTION TREATMENT INSTRUCTIONS    During the 30 minute wait after an allergy injection the following symptoms should be reported:    Itching other than at the injection site  Hives or swelling other than at the injection site  Redness other than at the injection site  Difficulty breathing  Chest tightness  Difficulty swallowing  Throat tightness    If these symptoms occur, NOTIFY

## 2024-02-27 ENCOUNTER — NURSE ONLY (OUTPATIENT)
Dept: ALLERGY | Age: 64
End: 2024-02-27
Payer: MEDICARE

## 2024-02-27 VITALS
SYSTOLIC BLOOD PRESSURE: 123 MMHG | RESPIRATION RATE: 16 BRPM | OXYGEN SATURATION: 96 % | DIASTOLIC BLOOD PRESSURE: 67 MMHG | HEART RATE: 84 BPM

## 2024-02-27 DIAGNOSIS — J30.1 ALLERGIC RHINITIS DUE TO POLLEN, UNSPECIFIED SEASONALITY: ICD-10-CM

## 2024-02-27 DIAGNOSIS — J30.9 CHRONIC ALLERGIC RHINITIS: Primary | ICD-10-CM

## 2024-02-27 PROCEDURE — 95117 IMMUNOTHERAPY INJECTIONS: CPT | Performed by: NURSE PRACTITIONER

## 2024-02-27 NOTE — PROGRESS NOTES
PATIENT HAS THE FOLLOWING DIAGNOSIS SUPPORTING ADMINISTRATION OF ALLERGY INJECTIONS: J30.1Allergic rhinitis due to pollen  AND [id number] MULTIPLE ALLERGY INJECTIONS FOR ALLERGY INJECTION ADMINISTRATION      Patient here for allergy injection today.  Patient was presented with the opportunity to speak with provider and ask questions regarding allergy injections.  Patient was also instructed they need to wait 30 minutes after receiving allergy injections. All risks associated with potential adverse effects have been explained to patient and patient handout was provided following allergy testing.    After consent obtained/verified, allergy injection subcutaneously given in back of arm(s).  Please see below for specific details of site injections, concentration of serum, and volume injected.    VIAL COLOR OF ALL VIALS TODAY IS red 1:1. Vial allergy w/v concentration today is: 1:1     ALLERGY INJECTION FROM VIAL A GIVEN left  UPPER ARM IN THE AMOUNT OF 0.50 ML    ALLERGY INJECTION FROM VIAL B GIVEN left upper ARM IN THE AMOUNT OF 0.50  ML        Documentation of vial injection specific to arm(s) noted on Allergy Immunotherapy Administration Form.       Patient waited 30 minutes for observation.No  Patient has received information and verbalizes understanding of the potential  health risks associated with allergy injections . Patient understands risks including anaphylaxis and chooses not to wait 30 minutes after administration of allergy injection(s).    Patient tolerated well without adverse reaction while the patient was in the office.    SHOT REACTION TREATMENT INSTRUCTIONS    During the 30 minute wait after an allergy injection the following symptoms should be reported:    Itching other than at the injection site  Hives or swelling other than at the injection site  Redness other than at the injection site  Difficulty breathing  Chest tightness  Difficulty swallowing  Throat tightness    If these symptoms occur, NOTIFY

## 2024-03-06 DIAGNOSIS — J30.9 CHRONIC ALLERGIC RHINITIS: Primary | ICD-10-CM

## 2024-03-06 DIAGNOSIS — Z29.89 IMMUNOTHERAPY: ICD-10-CM

## 2024-03-06 PROCEDURE — 95165 ANTIGEN THERAPY SERVICES: CPT | Performed by: NURSE PRACTITIONER

## 2024-03-06 NOTE — PROGRESS NOTES
and wishes to proceed.  SHOT REACTION TREATMENT INSTRUCTIONS    During the 30 minute wait after an allergy injection the following symptoms should be reported:    Itching other than at the injection site  Hives or swelling other than at the injection site  Redness other than at the injection site  Difficulty breathing  Chest tightness  Difficulty swallowing  Throat tightness    If these symptoms occur, NOTIFY PROVIDER and the following treatment should be administered:    1.  Epinephrine 1:1000 IM - 0.3 ml if > 66 lbs or more, 0.15 ml if 33 - 63 lbs, or 0.1 ml if <33 lbs   2.  Diphenhydramine - give all intramuscular:     2 to <6 years (off-label use): 6.25 mg,    6 to <12 years: 12.5 to 25 mg;    ?12 years: 25-50 mg.  3.  Famotidine:  Adults 40 mg oral    Adolescents age 16 years and >88 lbs: 40 mg    Children and Adolescents ?16 years of age: Initial: 0.25 mg/kg/dose every 12 hours (maximum daily dose: 40 mg/day)    Epi dose may me repeated in 5-15 minutes if adequate resolution of symptoms does not occur    Patient should be observed for at least one hour after final epi dose and must be seen by provider.  Patients cannot drive themselves if they have received diphenhydramine.      Patient needs allergy serum vials which were mixed.  It is medically necessary to mix patient's vials from a one-to-one concentration at other vials diluted to a 1:10, 1-100, 1:1000, and 1:10,000 concentration for appropriate dosage of the patient.  As a result the patient will be billed for all total serums that were mixed.  This ensures that the patient will not run out of serum and will receive appropriate care.  Kristen Sanderson DNP, APRN-BC personally approved the serum mixing and was present in the office during the mixing process

## 2024-03-12 ENCOUNTER — NURSE ONLY (OUTPATIENT)
Dept: ALLERGY | Age: 64
End: 2024-03-12
Payer: COMMERCIAL

## 2024-03-12 VITALS
OXYGEN SATURATION: 99 % | HEART RATE: 83 BPM | SYSTOLIC BLOOD PRESSURE: 118 MMHG | DIASTOLIC BLOOD PRESSURE: 80 MMHG | RESPIRATION RATE: 17 BRPM

## 2024-03-12 DIAGNOSIS — J30.1 ACUTE SEASONAL ALLERGIC RHINITIS DUE TO POLLEN: ICD-10-CM

## 2024-03-12 DIAGNOSIS — J30.1 ALLERGIC RHINITIS DUE TO POLLEN, UNSPECIFIED SEASONALITY: ICD-10-CM

## 2024-03-12 DIAGNOSIS — J30.9 CHRONIC ALLERGIC RHINITIS: Primary | ICD-10-CM

## 2024-03-12 PROCEDURE — 95117 IMMUNOTHERAPY INJECTIONS: CPT | Performed by: NURSE PRACTITIONER

## 2024-03-12 RX ORDER — OMEPRAZOLE 20 MG/1
20 CAPSULE, DELAYED RELEASE ORAL DAILY
COMMUNITY

## 2024-03-12 RX ORDER — ONDANSETRON 4 MG/1
4 TABLET, ORALLY DISINTEGRATING ORAL EVERY 8 HOURS PRN
COMMUNITY

## 2024-03-12 NOTE — PROGRESS NOTES
PROVIDER and the following treatment should be administered:    1.  Epinephrine/Auvi Q 1:1000 IM - 0.3 ml if > 66 lbs or more, 0.15 ml if 33 - 63 lbs, or 0.1 ml if <33 lbs     2.  Diphenhydramine - give all intramuscular:     2 to <6 years (off-label use): 6.25 mg,    6 to <12 years: 12.5 to 25 mg;    ?12 years: 25-50 mg.    3.  Famotidine:  Adults 40 mg oral    Adolescents age 16 years and >88 lbs: 40 mg    Children and Adolescents ?16 years of age: Initial: 0.25 mg/kg/dose  every 12 hours (maximum daily dose: 40 mg/day)    Epi/Auvi Q dose may me repeated in 5-15 minutes if adequate resolution of symptoms does not occur    Patient should be observed for at least one hour after final Epi/Auvi Q dose and must be seen by provider.  Patients cannot drive themselves if they have received diphenhydramine.

## 2024-03-26 ENCOUNTER — NURSE ONLY (OUTPATIENT)
Dept: ALLERGY | Age: 64
End: 2024-03-26
Payer: COMMERCIAL

## 2024-03-26 VITALS
RESPIRATION RATE: 18 BRPM | HEART RATE: 62 BPM | DIASTOLIC BLOOD PRESSURE: 90 MMHG | SYSTOLIC BLOOD PRESSURE: 124 MMHG | OXYGEN SATURATION: 97 %

## 2024-03-26 DIAGNOSIS — J30.1 ACUTE SEASONAL ALLERGIC RHINITIS DUE TO POLLEN: ICD-10-CM

## 2024-03-26 DIAGNOSIS — J30.9 CHRONIC ALLERGIC RHINITIS: Primary | ICD-10-CM

## 2024-03-26 DIAGNOSIS — J30.1 ALLERGIC RHINITIS DUE TO POLLEN, UNSPECIFIED SEASONALITY: ICD-10-CM

## 2024-03-26 PROCEDURE — 95117 IMMUNOTHERAPY INJECTIONS: CPT | Performed by: NURSE PRACTITIONER

## 2024-03-26 NOTE — PROGRESS NOTES
PATIENT HAS THE FOLLOWING DIAGNOSIS SUPPORTING ADMINISTRATION OF ALLERGY INJECTIONS: J30.1Allergic rhinitis due to pollen  AND 54987 MULTIPLE ALLERGY INJECTIONS FOR ALLERGY INJECTION ADMINISTRATION      Patient here for allergy injection today.  Patient was presented with the opportunity to speak with provider and ask questions regarding allergy injections.  Patient was also instructed they need to wait 30 minutes after receiving allergy injections. All risks associated with potential adverse effects have been explained to patient and patient handout was provided following allergy testing.    After consent obtained/verified, allergy injection subcutaneously given in back of arm(s).  Please see below for specific details of site injections, concentration of serum, and volume injected.    VIAL COLOR OF ALL VIALS TODAY IS red 1:1. Vial allergy w/v concentration today is: 1:1     ALLERGY INJECTION FROM VIAL A GIVEN left  UPPER ARM IN THE AMOUNT OF 0.50 ML    ALLERGY INJECTION FROM VIAL B GIVEN right upper ARM IN THE AMOUNT OF 0.50  ML        Documentation of vial injection specific to arm(s) noted on Allergy Immunotherapy Administration Form.       Patient waited 30 minutes for observation.No  Patient has received information and verbalizes understanding of the potential  health risks associated with allergy injections . Patient understands risks including anaphylaxis and chooses not to wait 30 minutes after administration of allergy injection(s).    Patient tolerated well without adverse reaction while the patient was in the office.    SHOT REACTION TREATMENT INSTRUCTIONS    During the 30 minute wait after an allergy injection the following symptoms should be reported:    Itching other than at the injection site  Hives or swelling other than at the injection site  Redness other than at the injection site  Difficulty breathing  Chest tightness  Difficulty swallowing  Throat tightness    If these symptoms occur, NOTIFY

## 2024-04-01 DIAGNOSIS — Z29.89 IMMUNOTHERAPY: ICD-10-CM

## 2024-04-01 DIAGNOSIS — J30.9 CHRONIC ALLERGIC RHINITIS: Primary | ICD-10-CM

## 2024-04-01 NOTE — PROGRESS NOTES
ALLERGY EXTRACT MIXING.  Monthly billing    DATE OF MIXING= 3/6/24  TOTAL NUMBER OF SET OF VIALS= 2  TOTAL VIALS PREPARED = 2  TOTAL INJECTABLE DOSES =   20 INJECTIONS PER SET  TOTAL ANTICIPATED DOSES = 40 INJECTIONS   TOTAL NUMBER OF INJECTIONS BILLED TODAY = 10     aLLERGY IMMUNOTHERAPY DOSES IS BILLED AT NO MORE THAN 30 UNITS PER MONTH.  TOTAL DOSES BILLED IS AT 30 DOSES PER MONTH UNTIL ALL ANTICIPATED DOSES ARE BILLED.     An allergy extract was prepared according to written prescription by provider.  Provider onsite during allergy extract preparation. Patient vial(s) include the following:     RED VIAL - 1:1 CONCENTRATION - EXPIRES 12 MONTHS  YELLOW VIAL-1:10 CONCENTRATION - EXPIRES 12 MONTHS  BLUE VIAL-1:100 CONCENTRATION - EXPIRES 12 MONTHS  GREEN VIAL-1:1,000 CONCENTRATION - EXPIRES 6 MONTHS  SILVER VIAL-1:10,000 CONCENTRATION - EXPIRES 6 MONTHS    Allergy extract was prepared by the following method:   RED TO YELLOW:  Once the  one-to-one concentration was prepared in the red vial, 0.5 ML's was then extracted in place into the yellow vial to achieve a 1:10 concentration.    YELLOW TO BLUE:  Then 0.5 ML's was extracted from the yellow vial and placed into the blue file with dilutant to achieve a 1:100 concentration.    BLUE TO GREEN:  Then 0.5 ML's was extracted from the blue vial and placed into Green vial with dilute to achieve a 1:1,000 concentration.    GREEN TO SILVER:  Then 0.5 ML's was taken from the green vial and placed in the Silver vial with dilutant to achieve a 1:10,000 concentration.      Patient vials were labeled with name, date-of-birth, vial (A,B,or C), concentration, and expiration.    When injecting from a new  vial the first injections is 0.05 ml and are increased by 0.05 increments until 0.5ml from the vial is achieved or the patient reaches maximum tolerated dose.   Injections are given once ot three times weekly and at least 24 hours apart, according to provider orders.   If

## 2024-04-09 ENCOUNTER — NURSE ONLY (OUTPATIENT)
Dept: ALLERGY | Age: 64
End: 2024-04-09
Payer: COMMERCIAL

## 2024-04-09 VITALS
DIASTOLIC BLOOD PRESSURE: 95 MMHG | RESPIRATION RATE: 18 BRPM | SYSTOLIC BLOOD PRESSURE: 141 MMHG | OXYGEN SATURATION: 90 % | HEART RATE: 84 BPM

## 2024-04-09 DIAGNOSIS — J30.9 CHRONIC ALLERGIC RHINITIS: Primary | ICD-10-CM

## 2024-04-09 DIAGNOSIS — J30.1 ALLERGIC RHINITIS DUE TO POLLEN, UNSPECIFIED SEASONALITY: ICD-10-CM

## 2024-04-09 DIAGNOSIS — J30.1 ACUTE SEASONAL ALLERGIC RHINITIS DUE TO POLLEN: ICD-10-CM

## 2024-04-09 PROCEDURE — 95117 IMMUNOTHERAPY INJECTIONS: CPT | Performed by: NURSE PRACTITIONER

## 2024-04-09 NOTE — PROGRESS NOTES
PATIENT HAS THE FOLLOWING DIAGNOSIS SUPPORTING ADMINISTRATION OF ALLERGY INJECTIONS: J30.1Allergic rhinitis due to pollen  AND [id number] MULTIPLE ALLERGY INJECTIONS FOR ALLERGY INJECTION ADMINISTRATION      Patient here for allergy injection today.  Patient was presented with the opportunity to speak with provider and ask questions regarding allergy injections.  Patient was also instructed they need to wait 30 minutes after receiving allergy injections. All risks associated with potential adverse effects have been explained to patient and patient handout was provided following allergy testing.    After consent obtained/verified, allergy injection subcutaneously given in back of arm(s).  Please see below for specific details of site injections, concentration of serum, and volume injected.    VIAL COLOR OF ALL VIALS TODAY IS red 1:1. Vial allergy w/v concentration today is: 1:1     ALLERGY INJECTION FROM VIAL A GIVEN right  UPPER ARM IN THE AMOUNT OF 0.50 ML    ALLERGY INJECTION FROM VIAL B GIVEN left upper ARM IN THE AMOUNT OF 0.50  ML          Documentation of vial injection specific to arm(s) noted on Allergy Immunotherapy Administration Form.       Patient waited 30 minutes for observation.No  Patient has received information and verbalizes understanding of the potential  health risks associated with allergy injections . Patient understands risks including anaphylaxis and chooses not to wait 30 minutes after administration of allergy injection(s).    Patient tolerated well without adverse reaction while the patient was in the office.    SHOT REACTION TREATMENT INSTRUCTIONS    During the 30 minute wait after an allergy injection the following symptoms should be reported:    Itching other than at the injection site  Hives or swelling other than at the injection site  Redness other than at the injection site  Difficulty breathing  Chest tightness  Difficulty swallowing  Throat tightness    If these symptoms occur, NOTIFY

## 2024-05-14 ENCOUNTER — NURSE ONLY (OUTPATIENT)
Dept: ALLERGY | Age: 64
End: 2024-05-14
Payer: COMMERCIAL

## 2024-05-14 VITALS
DIASTOLIC BLOOD PRESSURE: 80 MMHG | OXYGEN SATURATION: 98 % | HEART RATE: 93 BPM | RESPIRATION RATE: 18 BRPM | SYSTOLIC BLOOD PRESSURE: 121 MMHG

## 2024-05-14 DIAGNOSIS — Z91.09 ENCOUNTER FOR DESENSITIZATION TO POLLEN ALLERGEN: ICD-10-CM

## 2024-05-14 DIAGNOSIS — J30.9 CHRONIC ALLERGIC RHINITIS: Primary | ICD-10-CM

## 2024-05-14 DIAGNOSIS — Z91.09 MITE ALLERGY: ICD-10-CM

## 2024-05-14 DIAGNOSIS — Z77.120 EXPOSURE TO MOLD: ICD-10-CM

## 2024-05-14 DIAGNOSIS — J30.1 NON-SEASONAL ALLERGIC RHINITIS DUE TO POLLEN: ICD-10-CM

## 2024-05-14 DIAGNOSIS — J30.1 CHRONIC ALLERGIC RHINITIS DUE TO POLLEN: ICD-10-CM

## 2024-05-14 DIAGNOSIS — J30.1 ACUTE SEASONAL ALLERGIC RHINITIS DUE TO POLLEN: ICD-10-CM

## 2024-05-14 DIAGNOSIS — J30.1 ALLERGIC RHINITIS DUE TO POLLEN, UNSPECIFIED SEASONALITY: ICD-10-CM

## 2024-05-14 DIAGNOSIS — J30.1 ALLERGY TO TREES: ICD-10-CM

## 2024-05-14 DIAGNOSIS — Z91.048 ALLERGY TO MOLD: ICD-10-CM

## 2024-05-14 DIAGNOSIS — Z29.89 PROPHYLACTIC IMMUNOTHERAPY: ICD-10-CM

## 2024-05-14 DIAGNOSIS — Z51.6 ENCOUNTER FOR DESENSITIZATION TO POLLEN ALLERGEN: ICD-10-CM

## 2024-05-14 DIAGNOSIS — J30.89 NON-SEASONAL ALLERGIC RHINITIS DUE TO FUNGAL SPORES: ICD-10-CM

## 2024-05-14 DIAGNOSIS — J30.2 SEASONAL ALLERGIC RHINITIS DUE TO FUNGAL SPORES: ICD-10-CM

## 2024-05-14 PROCEDURE — 95117 IMMUNOTHERAPY INJECTIONS: CPT | Performed by: NURSE PRACTITIONER

## 2024-05-14 RX ORDER — CETIRIZINE HYDROCHLORIDE 10 MG/1
10 TABLET ORAL DAILY
Qty: 90 TABLET | Refills: 0 | Status: SHIPPED | OUTPATIENT
Start: 2024-05-14

## 2024-05-14 RX ORDER — MONTELUKAST SODIUM 10 MG/1
10 TABLET ORAL NIGHTLY
Qty: 90 TABLET | Refills: 0 | Status: SHIPPED | OUTPATIENT
Start: 2024-05-14

## 2024-05-14 NOTE — TELEPHONE ENCOUNTER
REMIND PATIENTS TO REQUESTS MEDICATIONS DURING THEIR REGULAR OFFICE VISIT EVEN IF THE MEDICATION IS NOT DUE.  THIS SAVES TIME FOR THE PATIENT AND PROVIDER.      IF MEDICATIONS ARE NOT DUE AT THAT TIME, THE PHARMACY MAY HOLD THE PRESCRIPTION TO FILL AT A LATER DATE.    MAKE SURE THE CORRECT PHARMACY IS SELECTED WITH THE MEDICATION REFILL REQUESTS.  MEDICATIONS THAT ARE SENT TO A DIFFERENT PHARMACY WILL NEED TO BE TRANSFERRED BY THE PATIENT TO THE CORRECT PHARMACY.  PLEASE VERIFY THE CORRECT PHARMACY AND LINK THE PHARMACY TO THE REFILL REQUEST.    ____________________________________________________________________________________________________________________________________    Patient requests the following medication to be refilled:      How often does patient take medication? DAILY    Patient is requesting 90 days of medication      Pharmacy (need the exact  pharmacy):     Patient last received medication on date:03/06/23  Number of refills given at last fill: 3  (If refills are current the patient does not need another refill)    Last appointment: 3/6/23  IF THE PATIENT HAS NOT BEEN SEEN DURING THE LAST YEAR, THE MUST HAVE AN APPT TO BE SEEN AND I WILL ONLD SEND IN 90 DAYS ONE TIME.    Next appt : 6/19/24    DID THE PATIENT MISS THE LAST APPT AS A NO SHOW?  no.  IF THE PATIENT WAS A NO SHOW I WILL NOT FILL THE MEDICATION.

## 2024-05-14 NOTE — TELEPHONE ENCOUNTER
REMIND PATIENTS TO REQUESTS MEDICATIONS DURING THEIR REGULAR OFFICE VISIT EVEN IF THE MEDICATION IS NOT DUE.  THIS SAVES TIME FOR THE PATIENT AND PROVIDER.      IF MEDICATIONS ARE NOT DUE AT THAT TIME, THE PHARMACY MAY HOLD THE PRESCRIPTION TO FILL AT A LATER DATE.    MAKE SURE THE CORRECT PHARMACY IS SELECTED WITH THE MEDICATION REFILL REQUESTS.  MEDICATIONS THAT ARE SENT TO A DIFFERENT PHARMACY WILL NEED TO BE TRANSFERRED BY THE PATIENT TO THE CORRECT PHARMACY.  PLEASE VERIFY THE CORRECT PHARMACY AND LINK THE PHARMACY TO THE REFILL REQUEST.    ____________________________________________________________________________________________________________________________________    Patient requests the following medication to be refilled:      How often does patient take medication? DAILY    Patient is requesting 90 days of medication      Pharmacy (need the exact  pharmacy):     Patient last received medication on date:03/06/23  Number of refills given at last fill: 3  (If refills are current the patient does not need another refill)    Last appointment: 03/06/23  IF THE PATIENT HAS NOT BEEN SEEN DURING THE LAST YEAR, THE MUST HAVE AN APPT TO BE SEEN AND I WILL ONLD SEND IN 90 DAYS ONE TIME.    Next appt :06/19/24    DID THE PATIENT MISS THE LAST APPT AS A NO SHOW?  no.  IF THE PATIENT WAS A NO SHOW I WILL NOT FILL THE MEDICATION.

## 2024-06-19 ENCOUNTER — OFFICE VISIT (OUTPATIENT)
Dept: ALLERGY | Age: 64
End: 2024-06-19
Payer: COMMERCIAL

## 2024-06-19 ENCOUNTER — NURSE ONLY (OUTPATIENT)
Dept: ALLERGY | Age: 64
End: 2024-06-19
Payer: COMMERCIAL

## 2024-06-19 VITALS
HEART RATE: 84 BPM | SYSTOLIC BLOOD PRESSURE: 137 MMHG | DIASTOLIC BLOOD PRESSURE: 69 MMHG | OXYGEN SATURATION: 99 % | RESPIRATION RATE: 18 BRPM

## 2024-06-19 VITALS
DIASTOLIC BLOOD PRESSURE: 69 MMHG | RESPIRATION RATE: 18 BRPM | SYSTOLIC BLOOD PRESSURE: 137 MMHG | WEIGHT: 163.6 LBS | OXYGEN SATURATION: 99 % | BODY MASS INDEX: 27.22 KG/M2 | HEART RATE: 84 BPM

## 2024-06-19 DIAGNOSIS — J30.1 SEASONAL ALLERGIC RHINITIS DUE TO POLLEN: ICD-10-CM

## 2024-06-19 DIAGNOSIS — J30.89 NON-SEASONAL ALLERGIC RHINITIS DUE TO FUNGAL SPORES: ICD-10-CM

## 2024-06-19 DIAGNOSIS — Z77.120 EXPOSURE TO MOLD: ICD-10-CM

## 2024-06-19 DIAGNOSIS — J30.2 SEASONAL ALLERGIC RHINITIS DUE TO FUNGAL SPORES: ICD-10-CM

## 2024-06-19 DIAGNOSIS — Z91.048 ALLERGY TO MOLD: ICD-10-CM

## 2024-06-19 DIAGNOSIS — Z51.6 ENCOUNTER FOR DESENSITIZATION TO POLLEN ALLERGEN: ICD-10-CM

## 2024-06-19 DIAGNOSIS — Z29.89 PROPHYLACTIC IMMUNOTHERAPY: ICD-10-CM

## 2024-06-19 DIAGNOSIS — J30.1 NON-SEASONAL ALLERGIC RHINITIS DUE TO POLLEN: ICD-10-CM

## 2024-06-19 DIAGNOSIS — J30.1 ALLERGY TO TREES: ICD-10-CM

## 2024-06-19 DIAGNOSIS — Z91.09 ENCOUNTER FOR DESENSITIZATION TO POLLEN ALLERGEN: ICD-10-CM

## 2024-06-19 DIAGNOSIS — Z91.09 MITE ALLERGY: ICD-10-CM

## 2024-06-19 DIAGNOSIS — J30.1 ACUTE SEASONAL ALLERGIC RHINITIS DUE TO POLLEN: ICD-10-CM

## 2024-06-19 DIAGNOSIS — J30.1 CHRONIC ALLERGIC RHINITIS DUE TO POLLEN: ICD-10-CM

## 2024-06-19 DIAGNOSIS — J30.9 CHRONIC ALLERGIC RHINITIS: Primary | ICD-10-CM

## 2024-06-19 PROCEDURE — 95117 IMMUNOTHERAPY INJECTIONS: CPT | Performed by: NURSE PRACTITIONER

## 2024-06-19 PROCEDURE — 99213 OFFICE O/P EST LOW 20 MIN: CPT | Performed by: NURSE PRACTITIONER

## 2024-06-19 PROCEDURE — 3017F COLORECTAL CA SCREEN DOC REV: CPT | Performed by: NURSE PRACTITIONER

## 2024-06-19 PROCEDURE — G8427 DOCREV CUR MEDS BY ELIG CLIN: HCPCS | Performed by: NURSE PRACTITIONER

## 2024-06-19 PROCEDURE — G8419 CALC BMI OUT NRM PARAM NOF/U: HCPCS | Performed by: NURSE PRACTITIONER

## 2024-06-19 PROCEDURE — 1036F TOBACCO NON-USER: CPT | Performed by: NURSE PRACTITIONER

## 2024-06-19 RX ORDER — MONTELUKAST SODIUM 10 MG/1
10 TABLET ORAL NIGHTLY
Qty: 90 TABLET | Refills: 3 | Status: SHIPPED | OUTPATIENT
Start: 2024-06-19

## 2024-06-19 RX ORDER — HYDROCODONE BITARTRATE AND ACETAMINOPHEN 5; 325 MG/1; MG/1
2 TABLET ORAL EVERY 6 HOURS PRN
COMMUNITY
Start: 2024-05-21

## 2024-06-19 RX ORDER — FEXOFENADINE HCL 180 MG/1
180 TABLET ORAL DAILY
Qty: 90 TABLET | Refills: 3 | Status: SHIPPED | OUTPATIENT
Start: 2024-06-19

## 2024-06-19 RX ORDER — FEXOFENADINE HCL 180 MG/1
180 TABLET ORAL DAILY
COMMUNITY
End: 2024-06-19 | Stop reason: SDUPTHER

## 2024-06-19 RX ORDER — EPINEPHRINE 0.3 MG/.3ML
0.3 INJECTION SUBCUTANEOUS ONCE
Qty: 1 EACH | Refills: 0 | Status: SHIPPED | OUTPATIENT
Start: 2024-06-19 | End: 2024-06-19

## 2024-06-19 RX ORDER — TRIAMCINOLONE ACETONIDE 55 UG/1
1 SPRAY, METERED NASAL 2 TIMES DAILY
Qty: 3 EACH | Refills: 3 | Status: SHIPPED | OUTPATIENT
Start: 2024-06-19

## 2024-06-19 RX ORDER — CETIRIZINE HYDROCHLORIDE 10 MG/1
10 TABLET ORAL DAILY
Qty: 90 TABLET | Refills: 3 | Status: SHIPPED | OUTPATIENT
Start: 2024-06-19

## 2024-06-19 ASSESSMENT — ENCOUNTER SYMPTOMS: RHINORRHEA: 1

## 2024-06-19 NOTE — PATIENT INSTRUCTIONS
6/19/2024   JAYLEN Martell CNP   [facility]YSICIANS [facility]  [address] [place] [address]  Dept: [phone]  Dept Fax: [phone]  Loc: [phone]     Dear Lola,  Thank you for your recent visit. We are committed to providing amazing patient care, and would like to make sure we were successful in providing you a great experience at our office. In the coming days, you may receive a survey via mail or email about your experience with my office. We ask that you please take a couple of minutes to complete and return it. We use our patients’ feedback to make improvements within the office that will make the experience even better for all of our patients.  We appreciate your time in helping us be the best with can!!!    Thank you, and be well!  JAYLEN Martell CNP

## 2024-06-19 NOTE — PROGRESS NOTES
CURRENT MEDS W/ ASSOC DIAG           Start Date End Date     ALLERGEN EXTRACT  --  --     Associated Diagnoses:  --     buPROPion (WELLBUTRIN XL) 300 MG extended release tablet  02/01/18  --     TAKE 1 TABLET EVERY MORNING     Associated Diagnoses:  Anxiety     cetirizine (ZYRTEC ALLERGY) 10 MG tablet  05/14/24  --     Take 1 tablet by mouth daily     Associated Diagnoses:  Non-seasonal allergic rhinitis due to pollen, Allergy to trees, Allergy to mold, Exposure to mold, Acute seasonal allergic rhinitis due to pollen, Chronic allergic rhinitis due to pollen, Seasonal allergic rhinitis due to fungal spores, Prophylactic immunotherapy, Mite allergy, Encounter for desensitization to pollen allergen, Non-seasonal allergic rhinitis due to fungal spores     Cyanocobalamin (VITAMIN B-12 CR PO)  --  --     Associated Diagnoses:  --     cyclobenzaprine (FLEXERIL) 10 MG tablet  02/01/18  --     Take 1 tablet by mouth 2 times daily as needed (muscle spasm)     Associated Diagnoses:  Chronic pain syndrome, Degenerative disc disease, lumbar     EPINEPHrine (AUVI-Q) 0.3 MG/0.3ML SOAJ injection  07/19/21  --     Dispense 2 packs of 2 (total 4 devices). Use as directed, STAT for allergic reaction.     Associated Diagnoses:  Prophylactic immunotherapy     Ferrous Sulfate (IRON) 325 (65 FE) MG TABS  --  --     Associated Diagnoses:  --     fexofenadine (ALLEGRA) 180 MG tablet  --  --     Associated Diagnoses:  --     medical marijuana  --  --     Associated Diagnoses:  --     montelukast (SINGULAIR) 10 MG tablet  05/14/24  --     Take 1 tablet by mouth nightly     Associated Diagnoses:  Non-seasonal allergic rhinitis due to pollen, Allergy to trees, Allergy to mold, Exposure to mold, Acute seasonal allergic rhinitis due to pollen, Chronic allergic rhinitis due to pollen, Seasonal allergic rhinitis due to fungal spores, Prophylactic immunotherapy, Mite allergy, Encounter for desensitization to pollen allergen, Non-seasonal allergic

## 2024-06-19 NOTE — PROGRESS NOTES
PATIENT HAS THE FOLLOWING DIAGNOSIS SUPPORTING ADMINISTRATION OF ALLERGY INJECTIONS: J30.1Allergic rhinitis due to pollen  AND [id number] MULTIPLE ALLERGY INJECTIONS FOR ALLERGY INJECTION ADMINISTRATION      Patient here for allergy injection today.  Patient was presented with the opportunity to speak with provider and ask questions regarding allergy injections.  Patient was also instructed they need to wait 30 minutes after receiving allergy injections. All risks associated with potential adverse effects have been explained to patient and patient handout was provided following allergy testing.    After consent obtained/verified, allergy injection subcutaneously given in back of arm(s).  Please see below for specific details of site injections, concentration of serum, and volume injected.    VIAL COLOR OF ALL VIALS TODAY IS red 1:1. Vial allergy w/v concentration today is: 1:1     ALLERGY INJECTION FROM VIAL A GIVEN right  UPPER ARM IN THE AMOUNT OF 0.40 ML    ALLERGY INJECTION FROM VIAL B GIVEN left upper ARM IN THE AMOUNT OF 0.40  ML          Documentation of vial injection specific to arm(s) noted on Allergy Immunotherapy Administration Form.       Patient waited 30 minutes for observation.No  Patient has received information and verbalizes understanding of the potential  health risks associated with allergy injections . Patient understands risks including anaphylaxis and chooses not to wait 30 minutes after administration of allergy injection(s).    Patient tolerated well without adverse reaction while the patient was in the office.    SHOT REACTION TREATMENT INSTRUCTIONS    During the 30 minute wait after an allergy injection the following symptoms should be reported:    Itching other than at the injection site  Hives or swelling other than at the injection site  Redness other than at the injection site  Difficulty breathing  Chest tightness  Difficulty swallowing  Throat tightness    If these symptoms occur, NOTIFY

## 2024-07-02 ENCOUNTER — NURSE ONLY (OUTPATIENT)
Dept: ALLERGY | Age: 64
End: 2024-07-02
Payer: COMMERCIAL

## 2024-07-02 VITALS
DIASTOLIC BLOOD PRESSURE: 76 MMHG | RESPIRATION RATE: 18 BRPM | HEART RATE: 82 BPM | OXYGEN SATURATION: 98 % | SYSTOLIC BLOOD PRESSURE: 123 MMHG

## 2024-07-02 DIAGNOSIS — J30.9 CHRONIC ALLERGIC RHINITIS: Primary | ICD-10-CM

## 2024-07-02 DIAGNOSIS — J30.1 SEASONAL ALLERGIC RHINITIS DUE TO POLLEN: ICD-10-CM

## 2024-07-02 DIAGNOSIS — J30.1 NON-SEASONAL ALLERGIC RHINITIS DUE TO POLLEN: ICD-10-CM

## 2024-07-02 PROCEDURE — 95117 IMMUNOTHERAPY INJECTIONS: CPT | Performed by: NURSE PRACTITIONER

## 2024-07-02 NOTE — PROGRESS NOTES
PATIENT HAS THE FOLLOWING DIAGNOSIS SUPPORTING ADMINISTRATION OF ALLERGY INJECTIONS: J30.1Allergic rhinitis due to pollen  AND [id number] MULTIPLE ALLERGY INJECTIONS FOR ALLERGY INJECTION ADMINISTRATION      Patient here for allergy injection today.  Patient was presented with the opportunity to speak with provider and ask questions regarding allergy injections.  Patient was also instructed they need to wait 30 minutes after receiving allergy injections. All risks associated with potential adverse effects have been explained to patient and patient handout was provided following allergy testing.    After consent obtained/verified, allergy injection subcutaneously given in back of arm(s).  Please see below for specific details of site injections, concentration of serum, and volume injected.    VIAL COLOR OF ALL VIALS TODAY IS red 1:1. Vial allergy w/v concentration today is: 1:1     ALLERGY INJECTION FROM VIAL A GIVEN left  UPPER ARM IN THE AMOUNT OF 0.45 ML    ALLERGY INJECTION FROM VIAL B GIVEN right upper ARM IN THE AMOUNT OF 0.45  ML          Documentation of vial injection specific to arm(s) noted on Allergy Immunotherapy Administration Form.       Patient waited 30 minutes for observation.No  Patient has received information and verbalizes understanding of the potential  health risks associated with allergy injections . Patient understands risks including anaphylaxis and chooses not to wait 30 minutes after administration of allergy injection(s).    Patient tolerated well without adverse reaction while the patient was in the office.    SHOT REACTION TREATMENT INSTRUCTIONS    During the 30 minute wait after an allergy injection the following symptoms should be reported:    Itching other than at the injection site  Hives or swelling other than at the injection site  Redness other than at the injection site  Difficulty breathing  Chest tightness  Difficulty swallowing  Throat tightness    If these symptoms occur, NOTIFY

## 2024-07-20 DIAGNOSIS — Z51.6 ENCOUNTER FOR DESENSITIZATION TO POLLEN ALLERGEN: ICD-10-CM

## 2024-07-20 DIAGNOSIS — J30.1 ALLERGY TO TREES: ICD-10-CM

## 2024-07-20 DIAGNOSIS — Z29.89 PROPHYLACTIC IMMUNOTHERAPY: ICD-10-CM

## 2024-07-20 DIAGNOSIS — J30.1 ACUTE SEASONAL ALLERGIC RHINITIS DUE TO POLLEN: ICD-10-CM

## 2024-07-20 DIAGNOSIS — Z91.09 MITE ALLERGY: ICD-10-CM

## 2024-07-20 DIAGNOSIS — Z91.048 ALLERGY TO MOLD: ICD-10-CM

## 2024-07-20 DIAGNOSIS — J30.89 NON-SEASONAL ALLERGIC RHINITIS DUE TO FUNGAL SPORES: ICD-10-CM

## 2024-07-20 DIAGNOSIS — J30.1 CHRONIC ALLERGIC RHINITIS DUE TO POLLEN: ICD-10-CM

## 2024-07-20 DIAGNOSIS — J30.1 NON-SEASONAL ALLERGIC RHINITIS DUE TO POLLEN: ICD-10-CM

## 2024-07-20 DIAGNOSIS — Z77.120 EXPOSURE TO MOLD: ICD-10-CM

## 2024-07-20 DIAGNOSIS — J30.2 SEASONAL ALLERGIC RHINITIS DUE TO FUNGAL SPORES: ICD-10-CM

## 2024-07-20 DIAGNOSIS — Z91.09 ENCOUNTER FOR DESENSITIZATION TO POLLEN ALLERGEN: ICD-10-CM

## 2024-07-22 RX ORDER — MONTELUKAST SODIUM 10 MG/1
10 TABLET ORAL NIGHTLY
Qty: 90 TABLET | Refills: 3 | OUTPATIENT
Start: 2024-07-22

## 2024-07-23 ENCOUNTER — LAB (OUTPATIENT)
Dept: ALLERGY | Age: 64
End: 2024-07-23
Payer: COMMERCIAL

## 2024-07-23 VITALS
HEIGHT: 65 IN | BODY MASS INDEX: 27.22 KG/M2 | DIASTOLIC BLOOD PRESSURE: 72 MMHG | HEART RATE: 85 BPM | SYSTOLIC BLOOD PRESSURE: 107 MMHG

## 2024-07-23 DIAGNOSIS — J30.9 CHRONIC ALLERGIC RHINITIS: ICD-10-CM

## 2024-07-23 DIAGNOSIS — J30.1 NON-SEASONAL ALLERGIC RHINITIS DUE TO POLLEN: Primary | ICD-10-CM

## 2024-07-23 PROCEDURE — 95117 IMMUNOTHERAPY INJECTIONS: CPT | Performed by: NURSE PRACTITIONER

## 2024-07-23 NOTE — PROGRESS NOTES
PATIENT HAS THE FOLLOWING DIAGNOSIS SUPPORTING ADMINISTRATION OF ALLERGY INJECTIONS: J30.1Allergic rhinitis due to pollen  AND [id number] MULTIPLE ALLERGY INJECTIONS FOR ALLERGY INJECTION ADMINISTRATION      Patient here for allergy injection today.  Patient was presented with the opportunity to speak with provider and ask questions regarding allergy injections.  Patient was also instructed they need to wait 30 minutes after receiving allergy injections. All risks associated with potential adverse effects have been explained to patient and patient handout was provided following allergy testing.    After consent obtained/verified, allergy injection subcutaneously given in back of arm(s).  Please see below for specific details of site injections, concentration of serum, and volume injected.    VIAL COLOR OF ALL VIALS TODAY IS red 1:1. Vial allergy w/v concentration today is: 1:1     ALLERGY INJECTION FROM VIAL A GIVEN right  UPPER ARM IN THE AMOUNT OF 0.50 ML    ALLERGY INJECTION FROM VIAL B GIVEN left  ARM IN THE AMOUNT OF 0.50  ML    Documentation of vial injection specific to arm(s) noted on Allergy Immunotherapy Administration Form.       Patient waited 30 minutes for observation.No  Patient has received information and verbalizes understanding of the potential  health risks associated with allergy injections . Patient understands risks including anaphylaxis and chooses not to wait 30 minutes after administration of allergy injection(s).    Patient tolerated well without adverse reaction while the patient was in the office.    SHOT REACTION TREATMENT INSTRUCTIONS    During the 30 minute wait after an allergy injection the following symptoms should be reported:    Itching other than at the injection site  Hives or swelling other than at the injection site  Redness other than at the injection site  Difficulty breathing  Chest tightness  Difficulty swallowing  Throat tightness    If these symptoms occur, NOTIFY PROVIDER

## 2024-07-30 ENCOUNTER — LAB (OUTPATIENT)
Age: 64
End: 2024-07-30

## 2024-08-02 LAB
BACTERIA UR CULT: ABNORMAL
ORGANISM: ABNORMAL

## 2024-08-06 ENCOUNTER — NURSE ONLY (OUTPATIENT)
Dept: ALLERGY | Age: 64
End: 2024-08-06
Payer: COMMERCIAL

## 2024-08-06 VITALS
RESPIRATION RATE: 20 BRPM | SYSTOLIC BLOOD PRESSURE: 125 MMHG | DIASTOLIC BLOOD PRESSURE: 82 MMHG | OXYGEN SATURATION: 98 % | HEART RATE: 79 BPM

## 2024-08-06 DIAGNOSIS — J30.1 NON-SEASONAL ALLERGIC RHINITIS DUE TO POLLEN: Primary | ICD-10-CM

## 2024-08-06 DIAGNOSIS — J30.2 SEASONAL ALLERGIC RHINITIS DUE TO FUNGAL SPORES: ICD-10-CM

## 2024-08-06 DIAGNOSIS — J30.1 ALLERGY TO TREES: ICD-10-CM

## 2024-08-06 PROCEDURE — 95117 IMMUNOTHERAPY INJECTIONS: CPT | Performed by: NURSE PRACTITIONER

## 2024-08-06 NOTE — PROGRESS NOTES
PATIENT HAS THE FOLLOWING DIAGNOSIS SUPPORTING ADMINISTRATION OF ALLERGY INJECTIONS: J30.1Allergic rhinitis due to pollen  AND [id number] MULTIPLE ALLERGY INJECTIONS FOR ALLERGY INJECTION ADMINISTRATION      Patient here for allergy injection today.  Patient was presented with the opportunity to speak with provider and ask questions regarding allergy injections.  Patient was also instructed they need to wait 30 minutes after receiving allergy injections. All risks associated with potential adverse effects have been explained to patient and patient handout was provided following allergy testing.    After consent obtained/verified, allergy injection subcutaneously given in back of arm(s).  Please see below for specific details of site injections, concentration of serum, and volume injected.    VIAL COLOR OF ALL VIALS TODAY IS red 1:1. Vial allergy w/v concentration today is: 1:1     ALLERGY INJECTION FROM VIAL A GIVEN left  UPPER ARM IN THE AMOUNT OF 0.50 ML    ALLERGY INJECTION FROM VIAL B GIVEN right upper ARM IN THE AMOUNT OF 0.50  ML        Documentation of vial injection specific to arm(s) noted on Allergy Immunotherapy Administration Form.       Patient waited 30 minutes for observation.No  Patient has received information and verbalizes understanding of the potential  health risks associated with allergy injections . Patient understands risks including anaphylaxis and chooses not to wait 30 minutes after administration of allergy injection(s).    Patient tolerated well without adverse reaction while the patient was in the office.    SHOT REACTION TREATMENT INSTRUCTIONS    During the 30 minute wait after an allergy injection the following symptoms should be reported:    Itching other than at the injection site  Hives or swelling other than at the injection site  Redness other than at the injection site  Difficulty breathing  Chest tightness  Difficulty swallowing  Throat tightness    If these symptoms occur, NOTIFY

## 2024-09-03 ENCOUNTER — NURSE ONLY (OUTPATIENT)
Dept: ALLERGY | Age: 64
End: 2024-09-03
Payer: COMMERCIAL

## 2024-09-03 VITALS
RESPIRATION RATE: 18 BRPM | SYSTOLIC BLOOD PRESSURE: 125 MMHG | OXYGEN SATURATION: 98 % | HEART RATE: 83 BPM | DIASTOLIC BLOOD PRESSURE: 81 MMHG

## 2024-09-03 DIAGNOSIS — J30.9 CHRONIC ALLERGIC RHINITIS: Primary | ICD-10-CM

## 2024-09-03 DIAGNOSIS — J30.1 NON-SEASONAL ALLERGIC RHINITIS DUE TO POLLEN: ICD-10-CM

## 2024-09-03 PROCEDURE — 95117 IMMUNOTHERAPY INJECTIONS: CPT | Performed by: NURSE PRACTITIONER

## 2024-09-03 NOTE — PROGRESS NOTES
PATIENT HAS THE FOLLOWING DIAGNOSIS SUPPORTING ADMINISTRATION OF ALLERGY INJECTIONS: J30.1Allergic rhinitis due to pollen  AND 03805 MULTIPLE ALLERGY INJECTIONS FOR ALLERGY INJECTION ADMINISTRATION      Patient here for allergy injection today.  Patient was presented with the opportunity to speak with provider and ask questions regarding allergy injections.  Patient was also instructed they need to wait 30 minutes after receiving allergy injections. All risks associated with potential adverse effects have been explained to patient and patient handout was provided following allergy testing.    After consent obtained/verified, allergy injection subcutaneously given in back of arm(s).  Please see below for specific details of site injections, concentration of serum, and volume injected.    VIAL COLOR OF ALL VIALS TODAY IS red 1:1. Vial allergy w/v concentration today is: 1:1     ALLERGY INJECTION FROM VIAL A GIVEN right  UPPER ARM IN THE AMOUNT OF 0.50 ML    ALLERGY INJECTION FROM VIAL B GIVEN left upper ARM IN THE AMOUNT OF 0.50  ML          Documentation of vial injection specific to arm(s) noted on Allergy Immunotherapy Administration Form.       Patient waited 30 minutes for observation.No  Patient has received information and verbalizes understanding of the potential  health risks associated with allergy injections . Patient understands risks including anaphylaxis and chooses not to wait 30 minutes after administration of allergy injection(s).    Patient tolerated well without adverse reaction while the patient was in the office.    SHOT REACTION TREATMENT INSTRUCTIONS    During the 30 minute wait after an allergy injection the following symptoms should be reported:    Itching other than at the injection site  Hives or swelling other than at the injection site  Redness other than at the injection site  Difficulty breathing  Chest tightness  Difficulty swallowing  Throat tightness    If these symptoms occur, NOTIFY

## 2024-10-01 ENCOUNTER — NURSE ONLY (OUTPATIENT)
Dept: ALLERGY | Age: 64
End: 2024-10-01
Payer: COMMERCIAL

## 2024-10-01 VITALS — DIASTOLIC BLOOD PRESSURE: 76 MMHG | SYSTOLIC BLOOD PRESSURE: 118 MMHG | OXYGEN SATURATION: 98 % | HEART RATE: 84 BPM

## 2024-10-01 DIAGNOSIS — J30.1 NON-SEASONAL ALLERGIC RHINITIS DUE TO POLLEN: ICD-10-CM

## 2024-10-01 DIAGNOSIS — J30.9 CHRONIC ALLERGIC RHINITIS: Primary | ICD-10-CM

## 2024-10-01 DIAGNOSIS — J30.1 ACUTE SEASONAL ALLERGIC RHINITIS DUE TO POLLEN: ICD-10-CM

## 2024-10-01 PROCEDURE — 95117 IMMUNOTHERAPY INJECTIONS: CPT | Performed by: NURSE PRACTITIONER

## 2024-10-01 NOTE — PROGRESS NOTES
PATIENT HAS THE FOLLOWING DIAGNOSIS SUPPORTING ADMINISTRATION OF ALLERGY INJECTIONS: J30.1Allergic rhinitis due to pollen  AND 76868 MULTIPLE ALLERGY INJECTIONS FOR ALLERGY INJECTION ADMINISTRATION      Patient here for allergy injection today.  Patient was presented with the opportunity to speak with provider and ask questions regarding allergy injections.  Patient was also instructed they need to wait 30 minutes after receiving allergy injections. All risks associated with potential adverse effects have been explained to patient and patient handout was provided following allergy testing.    After consent obtained/verified, allergy injection subcutaneously given in back of arm(s).  Please see below for specific details of site injections, concentration of serum, and volume injected.    VIAL COLOR OF ALL VIALS TODAY IS red 1:1. Vial allergy w/v concentration today is: 1:1     ALLERGY INJECTION FROM VIAL A GIVEN left  UPPER ARM IN THE AMOUNT OF 0.30 ML    ALLERGY INJECTION FROM VIAL B GIVEN right upper ARM IN THE AMOUNT OF 0.30  ML          Documentation of vial injection specific to arm(s) noted on Allergy Immunotherapy Administration Form.       Patient waited 30 minutes for observation.No  Patient has received information and verbalizes understanding of the potential  health risks associated with allergy injections . Patient understands risks including anaphylaxis and chooses not to wait 30 minutes after administration of allergy injection(s).    Patient tolerated well without adverse reaction while the patient was in the office.    SHOT REACTION TREATMENT INSTRUCTIONS    During the 30 minute wait after an allergy injection the following symptoms should be reported:    Itching other than at the injection site  Hives or swelling other than at the injection site  Redness other than at the injection site  Difficulty breathing  Chest tightness  Difficulty swallowing  Throat tightness    If these symptoms occur, NOTIFY

## 2024-11-05 ENCOUNTER — NURSE ONLY (OUTPATIENT)
Dept: ALLERGY | Age: 64
End: 2024-11-05
Payer: COMMERCIAL

## 2024-11-05 ENCOUNTER — TELEPHONE (OUTPATIENT)
Dept: ALLERGY | Age: 64
End: 2024-11-05

## 2024-11-05 VITALS
HEART RATE: 81 BPM | OXYGEN SATURATION: 98 % | RESPIRATION RATE: 18 BRPM | SYSTOLIC BLOOD PRESSURE: 109 MMHG | DIASTOLIC BLOOD PRESSURE: 79 MMHG

## 2024-11-05 DIAGNOSIS — J30.9 CHRONIC ALLERGIC RHINITIS: Primary | ICD-10-CM

## 2024-11-05 DIAGNOSIS — J30.1 NON-SEASONAL ALLERGIC RHINITIS DUE TO POLLEN: ICD-10-CM

## 2024-11-05 DIAGNOSIS — J30.1 ACUTE SEASONAL ALLERGIC RHINITIS DUE TO POLLEN: ICD-10-CM

## 2024-11-05 PROCEDURE — 95117 IMMUNOTHERAPY INJECTIONS: CPT | Performed by: NURSE PRACTITIONER

## 2024-11-05 NOTE — PROGRESS NOTES
PATIENT HAS THE FOLLOWING DIAGNOSIS SUPPORTING ADMINISTRATION OF ALLERGY INJECTIONS: J30.1Allergic rhinitis due to pollen  AND 87204 MULTIPLE ALLERGY INJECTIONS FOR ALLERGY INJECTION ADMINISTRATION      Patient here for allergy injection today.  Patient was presented with the opportunity to speak with provider and ask questions regarding allergy injections.  Patient was also instructed they need to wait 30 minutes after receiving allergy injections. All risks associated with potential adverse effects have been explained to patient and patient handout was provided following allergy testing.    After consent obtained/verified, allergy injection subcutaneously given in back of arm(s).  Please see below for specific details of site injections, concentration of serum, and volume injected.    VIAL COLOR OF ALL VIALS TODAY IS red 1:1. Vial allergy w/v concentration today is: 1:1     ALLERGY INJECTION FROM VIAL A GIVEN right  UPPER ARM IN THE AMOUNT OF 0.30 ML    ALLERGY INJECTION FROM VIAL B GIVEN left upper ARM IN THE AMOUNT OF 0.30  ML        Documentation of vial injection specific to arm(s) noted on Allergy Immunotherapy Administration Form.       Patient waited 30 minutes for observation.No  Patient has received information and verbalizes understanding of the potential  health risks associated with allergy injections . Patient understands risks including anaphylaxis and chooses not to wait 30 minutes after administration of allergy injection(s).    Patient tolerated well without adverse reaction while the patient was in the office.    SHOT REACTION TREATMENT INSTRUCTIONS    During the 30 minute wait after an allergy injection the following symptoms should be reported:    Itching other than at the injection site  Hives or swelling other than at the injection site  Redness other than at the injection site  Difficulty breathing  Chest tightness  Difficulty swallowing  Throat tightness    If these symptoms occur, NOTIFY

## 2024-11-05 NOTE — TELEPHONE ENCOUNTER
[place] screaming from back of office that was happening at the front of office on this pt. Walked to front of office and heard pt screaming and yelling \"fuck this and this place, this is fucking stupid.\" Pt walked out yelling at the front door and slamming purse into door. Pt was very aggressive in nature. Talked with CHERELLE Das on event that happened. Pt was upset because her allergy injection dose was being repeat due to MA following protocol and providers orders. Pt walked back into the office at  and continue to yell and scream about \"fuck this place and this is fucking stupid. Do I have to be perfect for getting an increase at all on allergy injections.\" Informed pt to calm down and take a deep breathe as we can get this sorted out with provider. Pt stated \"No, I'm done. Screw this place\". Provider was walking out to  as this pt was screaming but provider was with another patient. Provider informed on the incident as pt left. Again, pt was very aggressive in behavior.

## 2024-11-05 NOTE — TELEPHONE ENCOUNTER
Pt in today for allergy injections. While prepping patient chart and vials I noticed patient is monthly injection schedule. Verified last date of allergy injections to be 10/1/24. This would make patient 4 days late for allergy injections. Verified dosing with Lyssa ARMENDARIZ and Provider. Decided to repeat last dose of 0.30 ML due to pt being 4 days late. Informed pt that I would be repeating last dose. Pt became irate and started questioning why she is beng repeated. I calmly explained to patient that we are repeating last dose due to provider protocol for safety reasons. Pt stated she is scheduled to come once a month not every 30 days. Pt stated so I am supposed to come on the exact date and exact time every month and stating so the provider benefits from giving me repeat doses.I then tried to explain to pt that provider does not benefit from giving repeat doses. Pt became more irate after allowing me to give allergy injections stating that this is bullshit that I have to come at the exact time and exact date to not have to repeat dose. Pt stated that she will not be returning back to office for allergy injections. Pt then got up to leave slamming open the door while yelling and screaming stating this is bullshit and stating I am not doing this and stating I'm never coming back. Pt also swinging purse at front shelli as she was storming out the door. While informing Lyssa Armendariz of what happened pt came back in apologizing to [facility]  for her behavior. Pt then became irate again while talking to [facility]  stating this is bullshit that you guys are expecting me to be perfect when I am only supposed to come once a month if I needed to come every 30 days I should be told this.  [facility]  then explained to her that she is not clinical so she can not answer that question. As pt was still irate Lyssa Armendariz stepped out of a room to try to deescalate the situation. As pt was still irate

## 2025-02-28 ENCOUNTER — HOSPITAL ENCOUNTER (OUTPATIENT)
Age: 65
Discharge: HOME | End: 2025-02-28
Payer: MEDICARE

## 2025-02-28 LAB
ALBUMIN SERPL BCG-MCNC: 4.1 G/DL (ref 3.4–4.9)
ALP SERPL-CCNC: 82 U/L (ref 35–104)
ALT SERPL W/O P-5'-P-CCNC: 14 U/L (ref 10–35)
ANION GAP SERPL CALC-SCNC: 11 MEQ/L (ref 8–16)
AST SERPL-CCNC: 21 U/L (ref 10–35)
BILIRUB SERPL-MCNC: 0.3 MG/DL (ref 0.3–1.2)
BUN SERPL-MCNC: 8 MG/DL (ref 8–23)
CALCIUM SERPL-MCNC: 9 MG/DL (ref 8.8–10.2)
CHLORIDE SERPL-SCNC: 102 MEQ/L (ref 98–111)
CO2 SERPL-SCNC: 29 MEQ/L (ref 22–29)
CREAT SERPL-MCNC: 0.7 MG/DL (ref 0.5–0.9)
FOLATE SERPL-MCNC: > 20 NG/ML (ref 4.6–34.8)
GFR SERPL CREATININE-BSD FRML MDRD: > 90 ML/MIN/1.73M2
GLUCOSE SERPL-MCNC: 115 MG/DL (ref 74–109)
MAGNESIUM SERPL-MCNC: 1.9 MG/DL (ref 1.6–2.6)
PHOSPHATE SERPL-MCNC: 4.3 MG/DL (ref 2.5–4.5)
POTASSIUM SERPL-SCNC: 3.9 MEQ/L (ref 3.5–5.2)
PROT SERPL-MCNC: 6.5 G/DL (ref 6.4–8.3)
SODIUM SERPL-SCNC: 142 MEQ/L (ref 135–145)
TSH SERPL DL<=0.05 MIU/L-ACNC: 1.98 UIU/ML (ref 0.27–4.2)
VIT B12 SERPL-MCNC: 724 PG/ML (ref 232–1245)

## 2025-02-28 PROCEDURE — 80053 COMPREHEN METABOLIC PANEL: CPT

## 2025-02-28 PROCEDURE — 84100 ASSAY OF PHOSPHORUS: CPT

## 2025-02-28 PROCEDURE — 82746 ASSAY OF FOLIC ACID SERUM: CPT

## 2025-02-28 PROCEDURE — 82607 VITAMIN B-12: CPT

## 2025-02-28 PROCEDURE — 36415 COLL VENOUS BLD VENIPUNCTURE: CPT

## 2025-02-28 PROCEDURE — 83735 ASSAY OF MAGNESIUM: CPT

## 2025-02-28 PROCEDURE — 84443 ASSAY THYROID STIM HORMONE: CPT

## 2025-08-05 ENCOUNTER — TELEPHONE (OUTPATIENT)
Dept: ALLERGY | Age: 65
End: 2025-08-05